# Patient Record
Sex: FEMALE | Race: BLACK OR AFRICAN AMERICAN | NOT HISPANIC OR LATINO | Employment: UNEMPLOYED | URBAN - METROPOLITAN AREA
[De-identification: names, ages, dates, MRNs, and addresses within clinical notes are randomized per-mention and may not be internally consistent; named-entity substitution may affect disease eponyms.]

---

## 2017-01-06 ENCOUNTER — OFFICE VISIT - HEALTHEAST (OUTPATIENT)
Dept: SURGERY | Facility: CLINIC | Age: 27
End: 2017-01-06

## 2017-01-06 DIAGNOSIS — E66.01 MORBID OBESITY WITH BMI OF 45.0-49.9, ADULT (H): ICD-10-CM

## 2017-01-06 DIAGNOSIS — R63.2 HYPERPHAGIA: ICD-10-CM

## 2017-01-06 DIAGNOSIS — E66.01 MORBID OBESITY WITH BMI OF 40.0-44.9, ADULT (H): ICD-10-CM

## 2017-01-06 ASSESSMENT — MIFFLIN-ST. JEOR: SCORE: 1838.53

## 2017-02-14 ENCOUNTER — OFFICE VISIT - HEALTHEAST (OUTPATIENT)
Dept: SURGERY | Facility: CLINIC | Age: 27
End: 2017-02-14

## 2017-02-14 DIAGNOSIS — E66.01 OBESITY, MORBID, BMI 40.0-49.9 (H): ICD-10-CM

## 2017-02-14 DIAGNOSIS — Z71.3 DIETARY COUNSELING: ICD-10-CM

## 2017-02-14 ASSESSMENT — MIFFLIN-ST. JEOR: SCORE: 1836.26

## 2017-02-23 ENCOUNTER — COMMUNICATION - HEALTHEAST (OUTPATIENT)
Dept: GERIATRICS | Facility: CLINIC | Age: 27
End: 2017-02-23

## 2017-04-21 ENCOUNTER — OFFICE VISIT - HEALTHEAST (OUTPATIENT)
Dept: SURGERY | Facility: CLINIC | Age: 27
End: 2017-04-21

## 2017-04-21 DIAGNOSIS — E66.01 MORBID OBESITY (H): ICD-10-CM

## 2017-04-21 DIAGNOSIS — R63.2 BINGE EATING: ICD-10-CM

## 2017-04-21 DIAGNOSIS — R63.8 ABNORMAL CRAVING: ICD-10-CM

## 2017-04-21 DIAGNOSIS — F32.A ANXIETY AND DEPRESSION: ICD-10-CM

## 2017-04-21 DIAGNOSIS — F41.9 ANXIETY AND DEPRESSION: ICD-10-CM

## 2017-04-21 ASSESSMENT — MIFFLIN-ST. JEOR: SCORE: 1879.35

## 2017-04-24 ENCOUNTER — OFFICE VISIT - HEALTHEAST (OUTPATIENT)
Dept: SURGERY | Facility: CLINIC | Age: 27
End: 2017-04-24

## 2017-04-24 DIAGNOSIS — E66.01 MORBID OBESITY (H): ICD-10-CM

## 2017-07-14 ENCOUNTER — OFFICE VISIT - HEALTHEAST (OUTPATIENT)
Dept: SURGERY | Facility: CLINIC | Age: 27
End: 2017-07-14

## 2017-07-14 DIAGNOSIS — Z71.3 NUTRITIONAL COUNSELING: ICD-10-CM

## 2017-07-14 DIAGNOSIS — E66.01 OBESITY, MORBID, BMI 40.0-49.9 (H): ICD-10-CM

## 2017-07-14 ASSESSMENT — MIFFLIN-ST. JEOR: SCORE: 1895.23

## 2017-07-21 ENCOUNTER — OFFICE VISIT - HEALTHEAST (OUTPATIENT)
Dept: FAMILY MEDICINE | Facility: CLINIC | Age: 27
End: 2017-07-21

## 2017-07-21 ENCOUNTER — AMBULATORY - HEALTHEAST (OUTPATIENT)
Dept: FAMILY MEDICINE | Facility: CLINIC | Age: 27
End: 2017-07-21

## 2017-07-21 DIAGNOSIS — R87.619 ABNORMAL PAP SMEAR OF CERVIX: ICD-10-CM

## 2017-07-21 DIAGNOSIS — Z00.00 ANNUAL PHYSICAL EXAM: ICD-10-CM

## 2017-07-21 DIAGNOSIS — E55.9 VITAMIN D DEFICIENCY: ICD-10-CM

## 2017-07-21 DIAGNOSIS — J45.20 INTERMITTENT ASTHMA: ICD-10-CM

## 2017-07-21 DIAGNOSIS — Z11.3 SCREEN FOR STD (SEXUALLY TRANSMITTED DISEASE): ICD-10-CM

## 2017-07-21 DIAGNOSIS — E66.01 MORBID OBESITY (H): ICD-10-CM

## 2017-07-21 DIAGNOSIS — D64.9 ANEMIA: ICD-10-CM

## 2017-07-21 LAB
HBA1C MFR BLD: 5.6 % (ref 3.5–6)
HIV 1+2 AB+HIV1 P24 AG SERPL QL IA: NEGATIVE

## 2017-07-21 ASSESSMENT — MIFFLIN-ST. JEOR: SCORE: 1894.66

## 2017-07-24 LAB — SYPHILIS RPR SCREEN - HISTORICAL: NORMAL

## 2017-07-27 LAB
HPV INTERPRETATION - HISTORICAL: NORMAL
HPV INTERPRETER - HISTORICAL: NORMAL

## 2017-07-30 ENCOUNTER — COMMUNICATION - HEALTHEAST (OUTPATIENT)
Dept: FAMILY MEDICINE | Facility: CLINIC | Age: 27
End: 2017-07-30

## 2017-09-15 ENCOUNTER — COMMUNICATION - HEALTHEAST (OUTPATIENT)
Dept: SURGERY | Facility: CLINIC | Age: 27
End: 2017-09-15

## 2017-09-15 ENCOUNTER — AMBULATORY - HEALTHEAST (OUTPATIENT)
Dept: SURGERY | Facility: CLINIC | Age: 27
End: 2017-09-15

## 2017-09-15 ENCOUNTER — OFFICE VISIT - HEALTHEAST (OUTPATIENT)
Dept: SURGERY | Facility: CLINIC | Age: 27
End: 2017-09-15

## 2017-09-15 DIAGNOSIS — E66.01 OBESITY, MORBID, BMI 40.0-49.9 (H): ICD-10-CM

## 2017-09-15 DIAGNOSIS — E66.01 MORBID OBESITY WITH BMI OF 40.0-44.9, ADULT (H): ICD-10-CM

## 2017-09-15 DIAGNOSIS — Z71.3 NUTRITIONAL COUNSELING: ICD-10-CM

## 2017-09-15 ASSESSMENT — MIFFLIN-ST. JEOR: SCORE: 1902.03

## 2017-09-19 ENCOUNTER — AMBULATORY - HEALTHEAST (OUTPATIENT)
Dept: SURGERY | Facility: CLINIC | Age: 27
End: 2017-09-19

## 2017-09-19 DIAGNOSIS — R63.2 BINGE EATING: ICD-10-CM

## 2017-09-21 ENCOUNTER — AMBULATORY - HEALTHEAST (OUTPATIENT)
Dept: SURGERY | Facility: CLINIC | Age: 27
End: 2017-09-21

## 2017-11-28 ENCOUNTER — OFFICE VISIT - HEALTHEAST (OUTPATIENT)
Dept: SURGERY | Facility: CLINIC | Age: 27
End: 2017-11-28

## 2017-11-28 DIAGNOSIS — R63.2 HYPERPHAGIA: ICD-10-CM

## 2017-11-28 DIAGNOSIS — E66.01 MORBID OBESITY (H): ICD-10-CM

## 2017-11-28 ASSESSMENT — MIFFLIN-ST. JEOR: SCORE: 1854.4

## 2017-12-22 ENCOUNTER — OFFICE VISIT - HEALTHEAST (OUTPATIENT)
Dept: SURGERY | Facility: CLINIC | Age: 27
End: 2017-12-22

## 2017-12-22 DIAGNOSIS — Z71.3 NUTRITIONAL COUNSELING: ICD-10-CM

## 2017-12-22 DIAGNOSIS — E66.01 OBESITY, MORBID, BMI 40.0-49.9 (H): ICD-10-CM

## 2017-12-22 ASSESSMENT — MIFFLIN-ST. JEOR: SCORE: 1827.19

## 2018-05-15 ENCOUNTER — OFFICE VISIT - HEALTHEAST (OUTPATIENT)
Dept: SURGERY | Facility: CLINIC | Age: 28
End: 2018-05-15

## 2018-05-15 DIAGNOSIS — E66.01 MORBID OBESITY (H): ICD-10-CM

## 2018-05-15 DIAGNOSIS — R63.8 ABNORMAL CRAVING: ICD-10-CM

## 2018-05-15 DIAGNOSIS — R53.83 FATIGUE: ICD-10-CM

## 2018-05-15 ASSESSMENT — MIFFLIN-ST. JEOR: SCORE: 1831.72

## 2018-06-01 ENCOUNTER — COMMUNICATION - HEALTHEAST (OUTPATIENT)
Dept: SURGERY | Facility: CLINIC | Age: 28
End: 2018-06-01

## 2018-06-14 ENCOUNTER — COMMUNICATION - HEALTHEAST (OUTPATIENT)
Dept: SURGERY | Facility: CLINIC | Age: 28
End: 2018-06-14

## 2018-06-14 DIAGNOSIS — R63.2 HYPERPHAGIA: ICD-10-CM

## 2018-06-14 DIAGNOSIS — E66.01 MORBID OBESITY (H): ICD-10-CM

## 2018-07-20 ENCOUNTER — COMMUNICATION - HEALTHEAST (OUTPATIENT)
Dept: SURGERY | Facility: CLINIC | Age: 28
End: 2018-07-20

## 2018-07-20 ENCOUNTER — OFFICE VISIT - HEALTHEAST (OUTPATIENT)
Dept: SURGERY | Facility: CLINIC | Age: 28
End: 2018-07-20

## 2018-07-20 DIAGNOSIS — E55.9 VITAMIN D DEFICIENCY: ICD-10-CM

## 2018-07-20 DIAGNOSIS — E66.01 OBESITY, MORBID, BMI 40.0-49.9 (H): ICD-10-CM

## 2018-07-20 DIAGNOSIS — Z71.3 NUTRITIONAL COUNSELING: ICD-10-CM

## 2018-07-20 ASSESSMENT — MIFFLIN-ST. JEOR: SCORE: 1804.51

## 2018-07-24 ENCOUNTER — AMBULATORY - HEALTHEAST (OUTPATIENT)
Dept: SURGERY | Facility: CLINIC | Age: 28
End: 2018-07-24

## 2018-09-21 ENCOUNTER — OFFICE VISIT - HEALTHEAST (OUTPATIENT)
Dept: SURGERY | Facility: CLINIC | Age: 28
End: 2018-09-21

## 2018-09-21 DIAGNOSIS — E66.01 OBESITY, MORBID, BMI 40.0-49.9 (H): ICD-10-CM

## 2018-09-21 DIAGNOSIS — E55.9 VITAMIN D DEFICIENCY: ICD-10-CM

## 2018-09-21 DIAGNOSIS — Z71.3 NUTRITIONAL COUNSELING: ICD-10-CM

## 2018-09-21 ASSESSMENT — MIFFLIN-ST. JEOR: SCORE: 1833.99

## 2018-10-16 ENCOUNTER — COMMUNICATION - HEALTHEAST (OUTPATIENT)
Dept: SURGERY | Facility: CLINIC | Age: 28
End: 2018-10-16

## 2018-10-20 ENCOUNTER — COMMUNICATION - HEALTHEAST (OUTPATIENT)
Dept: SURGERY | Facility: CLINIC | Age: 28
End: 2018-10-20

## 2019-01-08 ENCOUNTER — OFFICE VISIT - HEALTHEAST (OUTPATIENT)
Dept: FAMILY MEDICINE | Facility: CLINIC | Age: 29
End: 2019-01-08

## 2019-01-08 DIAGNOSIS — E66.01 MORBID OBESITY (H): ICD-10-CM

## 2019-01-08 DIAGNOSIS — J45.20 MILD INTERMITTENT ASTHMA WITHOUT COMPLICATION: ICD-10-CM

## 2019-01-08 DIAGNOSIS — E55.9 VITAMIN D DEFICIENCY: ICD-10-CM

## 2019-01-08 DIAGNOSIS — Z79.899 HIGH RISK MEDICATION USE: ICD-10-CM

## 2019-01-08 DIAGNOSIS — Z00.00 ANNUAL PHYSICAL EXAM: ICD-10-CM

## 2019-01-08 DIAGNOSIS — Z11.3 SCREEN FOR STD (SEXUALLY TRANSMITTED DISEASE): ICD-10-CM

## 2019-01-08 LAB
CLUE CELLS: NORMAL
HBA1C MFR BLD: 5.2 % (ref 3.5–6)
HIV 1+2 AB+HIV1 P24 AG SERPL QL IA: NEGATIVE
TRICHOMONAS, WET PREP: NORMAL
YEAST, WET PREP: NORMAL

## 2019-01-08 ASSESSMENT — MIFFLIN-ST. JEOR: SCORE: 1771.62

## 2019-01-09 LAB
25(OH)D3 SERPL-MCNC: 44.9 NG/ML (ref 30–80)
25(OH)D3 SERPL-MCNC: 44.9 NG/ML (ref 30–80)
C TRACH DNA SPEC QL PROBE+SIG AMP: NEGATIVE
N GONORRHOEA DNA SPEC QL NAA+PROBE: NEGATIVE
T PALLIDUM AB SER QL: NEGATIVE

## 2019-01-13 ENCOUNTER — COMMUNICATION - HEALTHEAST (OUTPATIENT)
Dept: SURGERY | Facility: CLINIC | Age: 29
End: 2019-01-13

## 2019-01-14 ENCOUNTER — COMMUNICATION - HEALTHEAST (OUTPATIENT)
Dept: SURGERY | Facility: CLINIC | Age: 29
End: 2019-01-14

## 2019-01-14 DIAGNOSIS — R63.2 HYPERPHAGIA: ICD-10-CM

## 2019-01-14 DIAGNOSIS — E66.01 MORBID OBESITY (H): ICD-10-CM

## 2019-01-15 ENCOUNTER — AMBULATORY - HEALTHEAST (OUTPATIENT)
Dept: SURGERY | Facility: CLINIC | Age: 29
End: 2019-01-15

## 2019-01-15 DIAGNOSIS — E66.01 MORBID OBESITY (H): ICD-10-CM

## 2019-01-15 DIAGNOSIS — R63.2 HYPERPHAGIA: ICD-10-CM

## 2019-01-17 ENCOUNTER — AMBULATORY - HEALTHEAST (OUTPATIENT)
Dept: SURGERY | Facility: CLINIC | Age: 29
End: 2019-01-17

## 2019-01-17 DIAGNOSIS — E66.01 MORBID OBESITY (H): ICD-10-CM

## 2019-01-17 DIAGNOSIS — R63.2 HYPERPHAGIA: ICD-10-CM

## 2019-01-18 ENCOUNTER — COMMUNICATION - HEALTHEAST (OUTPATIENT)
Dept: SCHEDULING | Facility: CLINIC | Age: 29
End: 2019-01-18

## 2019-01-18 ENCOUNTER — RECORDS - HEALTHEAST (OUTPATIENT)
Dept: ADMINISTRATIVE | Facility: OTHER | Age: 29
End: 2019-01-18

## 2019-01-21 ENCOUNTER — OFFICE VISIT - HEALTHEAST (OUTPATIENT)
Dept: FAMILY MEDICINE | Facility: CLINIC | Age: 29
End: 2019-01-21

## 2019-01-21 DIAGNOSIS — Z32.01 PREGNANCY TEST POSITIVE: ICD-10-CM

## 2019-01-21 DIAGNOSIS — Z79.899 HIGH RISK MEDICATION USE: ICD-10-CM

## 2019-01-21 DIAGNOSIS — N92.6 ABNORMAL MENSES: ICD-10-CM

## 2019-01-21 DIAGNOSIS — O09.891 HISTORY OF PRETERM DELIVERY, CURRENTLY PREGNANT IN FIRST TRIMESTER: ICD-10-CM

## 2019-01-21 LAB
ALBUMIN UR-MCNC: NEGATIVE MG/DL
APPEARANCE UR: CLEAR
BILIRUB UR QL STRIP: NEGATIVE
COLOR UR AUTO: YELLOW
GLUCOSE UR STRIP-MCNC: NEGATIVE MG/DL
HCG UR QL: POSITIVE
HGB UR QL STRIP: NEGATIVE
KETONES UR STRIP-MCNC: NEGATIVE MG/DL
LEUKOCYTE ESTERASE UR QL STRIP: NEGATIVE
NITRATE UR QL: NEGATIVE
PH UR STRIP: 5.5 [PH] (ref 5–8)
SP GR UR STRIP: >=1.03 (ref 1–1.03)
UROBILINOGEN UR STRIP-ACNC: NORMAL

## 2019-04-16 ENCOUNTER — AMBULATORY - HEALTHEAST (OUTPATIENT)
Dept: CARDIOLOGY | Facility: HOSPITAL | Age: 29
End: 2019-04-16

## 2019-04-16 ENCOUNTER — HOSPITAL ENCOUNTER (OUTPATIENT)
Dept: CARDIOLOGY | Facility: HOSPITAL | Age: 29
Discharge: HOME | End: 2019-04-16
Attending: OBSTETRICS & GYNECOLOGY

## 2019-04-16 DIAGNOSIS — O09.90 HIGH-RISK PREGNANCY, UNSPECIFIED TRIMESTER: ICD-10-CM

## 2019-04-16 DIAGNOSIS — R42 VERTIGO: ICD-10-CM

## 2019-04-16 LAB
ATRIAL RATE - MUSE: 91 BPM
DIASTOLIC BLOOD PRESSURE - MUSE: NORMAL MMHG
INTERPRETATION ECG - MUSE: NORMAL
P AXIS - MUSE: 48 DEGREES
PR INTERVAL - MUSE: 156 MS
QRS DURATION - MUSE: 68 MS
QT - MUSE: 332 MS
QTC - MUSE: 408 MS
R AXIS - MUSE: 66 DEGREES
SYSTOLIC BLOOD PRESSURE - MUSE: NORMAL MMHG
T AXIS - MUSE: 20 DEGREES
VENTRICULAR RATE- MUSE: 91 BPM

## 2019-04-18 ENCOUNTER — OFFICE VISIT - HEALTHEAST (OUTPATIENT)
Dept: FAMILY MEDICINE | Facility: CLINIC | Age: 29
End: 2019-04-18

## 2019-04-18 DIAGNOSIS — R55 SYNCOPE, UNSPECIFIED SYNCOPE TYPE: ICD-10-CM

## 2019-04-18 DIAGNOSIS — Z32.01 PREGNANCY TEST POSITIVE: ICD-10-CM

## 2019-04-18 DIAGNOSIS — E66.01 MORBID OBESITY (H): ICD-10-CM

## 2019-04-25 ENCOUNTER — OFFICE VISIT - HEALTHEAST (OUTPATIENT)
Dept: CARDIOLOGY | Facility: CLINIC | Age: 29
End: 2019-04-25

## 2019-04-25 DIAGNOSIS — R55 SYNCOPE, UNSPECIFIED SYNCOPE TYPE: ICD-10-CM

## 2019-04-25 DIAGNOSIS — E66.01 MORBID OBESITY (H): ICD-10-CM

## 2019-04-25 LAB
ATRIAL RATE - MUSE: 99 BPM
DIASTOLIC BLOOD PRESSURE - MUSE: NORMAL MMHG
INTERPRETATION ECG - MUSE: NORMAL
P AXIS - MUSE: NORMAL DEGREES
PR INTERVAL - MUSE: 162 MS
QRS DURATION - MUSE: 74 MS
QT - MUSE: 338 MS
QTC - MUSE: 433 MS
R AXIS - MUSE: 133 DEGREES
SYSTOLIC BLOOD PRESSURE - MUSE: NORMAL MMHG
T AXIS - MUSE: 163 DEGREES
VENTRICULAR RATE- MUSE: 99 BPM

## 2019-04-25 ASSESSMENT — MIFFLIN-ST. JEOR: SCORE: 1785.23

## 2019-05-01 ENCOUNTER — RECORDS - HEALTHEAST (OUTPATIENT)
Dept: ADMINISTRATIVE | Facility: OTHER | Age: 29
End: 2019-05-01

## 2019-05-02 ENCOUNTER — HOSPITAL ENCOUNTER (OUTPATIENT)
Dept: CARDIOLOGY | Facility: HOSPITAL | Age: 29
Discharge: HOME | End: 2019-05-02
Attending: INTERNAL MEDICINE

## 2019-05-02 ENCOUNTER — RECORDS - HEALTHEAST (OUTPATIENT)
Dept: ADMINISTRATIVE | Facility: OTHER | Age: 29
End: 2019-05-02

## 2019-05-02 DIAGNOSIS — R55 SYNCOPE, UNSPECIFIED SYNCOPE TYPE: ICD-10-CM

## 2019-05-14 ENCOUNTER — RECORDS - HEALTHEAST (OUTPATIENT)
Dept: ADMINISTRATIVE | Facility: OTHER | Age: 29
End: 2019-05-14

## 2019-05-20 ENCOUNTER — TELEPHONE (OUTPATIENT)
Dept: MATERNAL FETAL MEDICINE | Facility: CLINIC | Age: 29
End: 2019-05-20

## 2019-05-20 ENCOUNTER — MEDICAL CORRESPONDENCE (OUTPATIENT)
Dept: HEALTH INFORMATION MANAGEMENT | Facility: CLINIC | Age: 29
End: 2019-05-20

## 2019-05-20 NOTE — TELEPHONE ENCOUNTER
"[facility] received a referral from provider, Crystal Dozier, at [facility] regarding \"Twins\".  There was no marking on what the provider wanted patient to be seen for. Writer called and spoke to Kathi at [facility], and she said to disregard referral due to fetal loss.      Referring clinic notified. Removing order.    Stefano Haynes,    , [facility]   "

## 2019-10-01 ENCOUNTER — OFFICE VISIT - HEALTHEAST (OUTPATIENT)
Dept: SURGERY | Facility: CLINIC | Age: 29
End: 2019-10-01

## 2019-10-01 DIAGNOSIS — F50.89 PICA: ICD-10-CM

## 2019-10-01 DIAGNOSIS — E66.01 MORBID OBESITY WITH BMI OF 40.0-44.9, ADULT (H): ICD-10-CM

## 2019-10-01 DIAGNOSIS — D50.9 IRON DEFICIENCY ANEMIA, UNSPECIFIED IRON DEFICIENCY ANEMIA TYPE: ICD-10-CM

## 2019-10-01 ASSESSMENT — MIFFLIN-ST. JEOR: SCORE: 1807.91

## 2019-10-08 ENCOUNTER — COMMUNICATION - HEALTHEAST (OUTPATIENT)
Dept: SURGERY | Facility: CLINIC | Age: 29
End: 2019-10-08

## 2019-10-21 ENCOUNTER — OFFICE VISIT - HEALTHEAST (OUTPATIENT)
Dept: SURGERY | Facility: CLINIC | Age: 29
End: 2019-10-21

## 2019-10-21 DIAGNOSIS — E66.01 OBESITY, MORBID, BMI 40.0-49.9 (H): ICD-10-CM

## 2019-10-21 DIAGNOSIS — F32.A DEPRESSION, UNSPECIFIED DEPRESSION TYPE: ICD-10-CM

## 2019-10-21 DIAGNOSIS — Z71.3 NUTRITIONAL COUNSELING: ICD-10-CM

## 2019-10-21 ASSESSMENT — MIFFLIN-ST. JEOR: SCORE: 1812.44

## 2019-12-17 ENCOUNTER — OFFICE VISIT - HEALTHEAST (OUTPATIENT)
Dept: SURGERY | Facility: CLINIC | Age: 29
End: 2019-12-17

## 2019-12-17 DIAGNOSIS — R63.8 ABNORMAL CRAVING: ICD-10-CM

## 2019-12-17 DIAGNOSIS — E66.01 MORBID OBESITY WITH BMI OF 40.0-44.9, ADULT (H): ICD-10-CM

## 2019-12-17 ASSESSMENT — MIFFLIN-ST. JEOR: SCORE: 1844.2

## 2019-12-19 ENCOUNTER — OFFICE VISIT - HEALTHEAST (OUTPATIENT)
Dept: FAMILY MEDICINE | Facility: CLINIC | Age: 29
End: 2019-12-19

## 2019-12-19 DIAGNOSIS — E66.01 MORBID OBESITY (H): ICD-10-CM

## 2019-12-19 DIAGNOSIS — N88.3 CI (CERVICAL INCOMPETENCE): ICD-10-CM

## 2019-12-19 DIAGNOSIS — O34.32 CERVICAL INSUFFICIENCY DURING PREGNANCY IN SECOND TRIMESTER, ANTEPARTUM: ICD-10-CM

## 2019-12-19 DIAGNOSIS — O24.410 DIET CONTROLLED GESTATIONAL DIABETES MELLITUS (GDM) IN SECOND TRIMESTER: ICD-10-CM

## 2019-12-19 DIAGNOSIS — J45.20 MILD INTERMITTENT ASTHMA WITHOUT COMPLICATION: ICD-10-CM

## 2020-03-18 ENCOUNTER — COMMUNICATION - HEALTHEAST (OUTPATIENT)
Dept: SURGERY | Facility: CLINIC | Age: 30
End: 2020-03-18

## 2020-04-23 ENCOUNTER — COMMUNICATION - HEALTHEAST (OUTPATIENT)
Dept: SURGERY | Facility: CLINIC | Age: 30
End: 2020-04-23

## 2020-04-23 ENCOUNTER — OFFICE VISIT - HEALTHEAST (OUTPATIENT)
Dept: SURGERY | Facility: CLINIC | Age: 30
End: 2020-04-23

## 2020-04-23 DIAGNOSIS — E66.01 MORBID OBESITY (H): ICD-10-CM

## 2020-04-23 DIAGNOSIS — J45.20 MILD INTERMITTENT ASTHMA WITHOUT COMPLICATION: ICD-10-CM

## 2020-04-23 DIAGNOSIS — R53.83 FATIGUE, UNSPECIFIED TYPE: ICD-10-CM

## 2020-04-23 DIAGNOSIS — M54.50 LOW BACK PAIN, UNSPECIFIED BACK PAIN LATERALITY, UNSPECIFIED CHRONICITY, UNSPECIFIED WHETHER SCIATICA PRESENT: ICD-10-CM

## 2020-04-23 ASSESSMENT — MIFFLIN-ST. JEOR: SCORE: 1871.41

## 2020-04-29 ENCOUNTER — COMMUNICATION - HEALTHEAST (OUTPATIENT)
Dept: SURGERY | Facility: CLINIC | Age: 30
End: 2020-04-29

## 2020-04-30 ENCOUNTER — COMMUNICATION - HEALTHEAST (OUTPATIENT)
Dept: SURGERY | Facility: CLINIC | Age: 30
End: 2020-04-30

## 2020-04-30 ENCOUNTER — AMBULATORY - HEALTHEAST (OUTPATIENT)
Dept: LAB | Facility: CLINIC | Age: 30
End: 2020-04-30

## 2020-04-30 DIAGNOSIS — M54.50 LOW BACK PAIN, UNSPECIFIED BACK PAIN LATERALITY, UNSPECIFIED CHRONICITY, UNSPECIFIED WHETHER SCIATICA PRESENT: ICD-10-CM

## 2020-04-30 DIAGNOSIS — E66.01 MORBID OBESITY (H): ICD-10-CM

## 2020-04-30 DIAGNOSIS — R53.83 FATIGUE, UNSPECIFIED TYPE: ICD-10-CM

## 2020-04-30 DIAGNOSIS — J45.20 MILD INTERMITTENT ASTHMA WITHOUT COMPLICATION: ICD-10-CM

## 2020-04-30 LAB
ALBUMIN SERPL-MCNC: 3.4 G/DL (ref 3.5–5)
ALP SERPL-CCNC: 61 U/L (ref 45–120)
ALT SERPL W P-5'-P-CCNC: 14 U/L (ref 0–45)
ANION GAP SERPL CALCULATED.3IONS-SCNC: 9 MMOL/L (ref 5–18)
AST SERPL W P-5'-P-CCNC: 16 U/L (ref 0–40)
BILIRUB SERPL-MCNC: 0.3 MG/DL (ref 0–1)
BUN SERPL-MCNC: 8 MG/DL (ref 8–22)
CALCIUM SERPL-MCNC: 9.1 MG/DL (ref 8.5–10.5)
CHLORIDE BLD-SCNC: 105 MMOL/L (ref 98–107)
CHOLEST SERPL-MCNC: 146 MG/DL
CO2 SERPL-SCNC: 25 MMOL/L (ref 22–31)
CREAT SERPL-MCNC: 0.73 MG/DL (ref 0.6–1.1)
ERYTHROCYTE [DISTWIDTH] IN BLOOD BY AUTOMATED COUNT: 14.2 % (ref 11–14.5)
FASTING STATUS PATIENT QL REPORTED: YES
FERRITIN SERPL-MCNC: 10 NG/ML (ref 10–130)
FOLATE SERPL-MCNC: 18.6 NG/ML
GFR SERPL CREATININE-BSD FRML MDRD: >60 ML/MIN/1.73M2
GLUCOSE BLD-MCNC: 79 MG/DL (ref 70–125)
HBA1C MFR BLD: 5.1 % (ref 3.5–6)
HCT VFR BLD AUTO: 37.7 % (ref 35–47)
HDLC SERPL-MCNC: 61 MG/DL
HGB BLD-MCNC: 12.2 G/DL (ref 12–16)
LDLC SERPL CALC-MCNC: 74 MG/DL
MCH RBC QN AUTO: 23.6 PG (ref 27–34)
MCHC RBC AUTO-ENTMCNC: 32.4 G/DL (ref 32–36)
MCV RBC AUTO: 73 FL (ref 80–100)
PLATELET # BLD AUTO: 272 THOU/UL (ref 140–440)
PMV BLD AUTO: 9.1 FL (ref 7–10)
POTASSIUM BLD-SCNC: 4.4 MMOL/L (ref 3.5–5)
PROT SERPL-MCNC: 6.9 G/DL (ref 6–8)
PTH-INTACT SERPL-MCNC: 44 PG/ML (ref 10–86)
RBC # BLD AUTO: 5.18 MILL/UL (ref 3.8–5.4)
SODIUM SERPL-SCNC: 139 MMOL/L (ref 136–145)
TRIGL SERPL-MCNC: 55 MG/DL
TSH SERPL DL<=0.005 MIU/L-ACNC: 0.91 UIU/ML (ref 0.3–5)
VIT B12 SERPL-MCNC: 921 PG/ML (ref 213–816)
WBC: 7.8 THOU/UL (ref 4–11)

## 2020-05-01 LAB — 25(OH)D3 SERPL-MCNC: 31.2 NG/ML (ref 30–80)

## 2020-05-02 LAB — ZINC SERPL-MCNC: 67.5 UG/DL (ref 60–120)

## 2020-05-03 LAB
ANNOTATION COMMENT IMP: NORMAL
VIT A SERPL-MCNC: 0.45 MG/L (ref 0.3–1.2)
VIT B1 PYROPHOSHATE BLD-SCNC: 91 NMOL/L (ref 70–180)
VITAMIN A (RETINYL PALMITATE): 0.02 MG/L (ref 0–0.1)

## 2020-05-04 ENCOUNTER — COMMUNICATION - HEALTHEAST (OUTPATIENT)
Dept: SURGERY | Facility: CLINIC | Age: 30
End: 2020-05-04

## 2020-05-15 ENCOUNTER — COMMUNICATION - HEALTHEAST (OUTPATIENT)
Dept: SURGERY | Facility: CLINIC | Age: 30
End: 2020-05-15

## 2020-05-15 ENCOUNTER — OFFICE VISIT - HEALTHEAST (OUTPATIENT)
Dept: SURGERY | Facility: CLINIC | Age: 30
End: 2020-05-15

## 2020-05-15 DIAGNOSIS — E66.01 OBESITY, CLASS III, BMI 40-49.9 (MORBID OBESITY) (H): ICD-10-CM

## 2020-05-15 DIAGNOSIS — Z71.3 NUTRITIONAL COUNSELING: ICD-10-CM

## 2020-05-15 DIAGNOSIS — M54.50 LOW BACK PAIN, UNSPECIFIED BACK PAIN LATERALITY, UNSPECIFIED CHRONICITY, UNSPECIFIED WHETHER SCIATICA PRESENT: ICD-10-CM

## 2020-05-15 DIAGNOSIS — J45.20 MILD INTERMITTENT ASTHMA WITHOUT COMPLICATION: ICD-10-CM

## 2020-05-15 DIAGNOSIS — E66.01 MORBID OBESITY (H): ICD-10-CM

## 2020-05-15 DIAGNOSIS — R53.83 FATIGUE, UNSPECIFIED TYPE: ICD-10-CM

## 2020-05-15 ASSESSMENT — MIFFLIN-ST. JEOR: SCORE: 1903.16

## 2020-05-19 ENCOUNTER — AMBULATORY - HEALTHEAST (OUTPATIENT)
Dept: SURGERY | Facility: CLINIC | Age: 30
End: 2020-05-19

## 2020-05-19 DIAGNOSIS — E66.01 MORBID OBESITY (H): ICD-10-CM

## 2020-05-20 ENCOUNTER — COMMUNICATION - HEALTHEAST (OUTPATIENT)
Dept: SURGERY | Facility: CLINIC | Age: 30
End: 2020-05-20

## 2020-05-21 ENCOUNTER — OFFICE VISIT - HEALTHEAST (OUTPATIENT)
Dept: SURGERY | Facility: CLINIC | Age: 30
End: 2020-05-21

## 2020-05-21 ENCOUNTER — COMMUNICATION - HEALTHEAST (OUTPATIENT)
Dept: SURGERY | Facility: CLINIC | Age: 30
End: 2020-05-21

## 2020-05-21 DIAGNOSIS — E66.01 MORBID OBESITY (H): ICD-10-CM

## 2020-06-04 ENCOUNTER — OFFICE VISIT - HEALTHEAST (OUTPATIENT)
Dept: SURGERY | Facility: CLINIC | Age: 30
End: 2020-06-04

## 2020-06-04 DIAGNOSIS — E66.01 MORBID OBESITY (H): ICD-10-CM

## 2020-06-19 ENCOUNTER — OFFICE VISIT - HEALTHEAST (OUTPATIENT)
Dept: SURGERY | Facility: CLINIC | Age: 30
End: 2020-06-19

## 2020-06-19 DIAGNOSIS — Z71.3 NUTRITIONAL COUNSELING: ICD-10-CM

## 2020-06-19 DIAGNOSIS — E66.01 OBESITY, CLASS III, BMI 40-49.9 (MORBID OBESITY) (H): ICD-10-CM

## 2020-06-19 ASSESSMENT — MIFFLIN-ST. JEOR: SCORE: 1880.48

## 2020-06-22 ENCOUNTER — OFFICE VISIT - HEALTHEAST (OUTPATIENT)
Dept: SURGERY | Facility: CLINIC | Age: 30
End: 2020-06-22

## 2020-06-22 DIAGNOSIS — E66.01 MORBID OBESITY (H): ICD-10-CM

## 2020-06-25 ENCOUNTER — AMBULATORY - HEALTHEAST (OUTPATIENT)
Dept: SURGERY | Facility: CLINIC | Age: 30
End: 2020-06-25

## 2020-07-01 ENCOUNTER — COMMUNICATION - HEALTHEAST (OUTPATIENT)
Dept: SURGERY | Facility: CLINIC | Age: 30
End: 2020-07-01

## 2020-07-01 ENCOUNTER — AMBULATORY - HEALTHEAST (OUTPATIENT)
Dept: SURGERY | Facility: CLINIC | Age: 30
End: 2020-07-01

## 2020-07-01 DIAGNOSIS — E66.01 MORBID OBESITY (H): ICD-10-CM

## 2020-07-16 ENCOUNTER — OFFICE VISIT - HEALTHEAST (OUTPATIENT)
Dept: FAMILY MEDICINE | Facility: CLINIC | Age: 30
End: 2020-07-16

## 2020-07-16 DIAGNOSIS — J45.20 MILD INTERMITTENT ASTHMA WITHOUT COMPLICATION: ICD-10-CM

## 2020-07-16 DIAGNOSIS — F33.0 MILD EPISODE OF RECURRENT MAJOR DEPRESSIVE DISORDER (H): ICD-10-CM

## 2020-07-16 DIAGNOSIS — Z00.00 ANNUAL PHYSICAL EXAM: ICD-10-CM

## 2020-07-16 DIAGNOSIS — Z11.3 SCREEN FOR STD (SEXUALLY TRANSMITTED DISEASE): ICD-10-CM

## 2020-07-16 DIAGNOSIS — A74.9 CHLAMYDIA INFECTION: ICD-10-CM

## 2020-07-16 DIAGNOSIS — E66.01 MORBID OBESITY (H): ICD-10-CM

## 2020-07-16 LAB
CLUE CELLS: NORMAL
TRICHOMONAS, WET PREP: NORMAL
YEAST, WET PREP: NORMAL

## 2020-07-16 ASSESSMENT — MIFFLIN-ST. JEOR: SCORE: 1886.72

## 2020-07-16 ASSESSMENT — PATIENT HEALTH QUESTIONNAIRE - PHQ9: SUM OF ALL RESPONSES TO PHQ QUESTIONS 1-9: 9

## 2020-07-17 ENCOUNTER — COMMUNICATION - HEALTHEAST (OUTPATIENT)
Dept: FAMILY MEDICINE | Facility: CLINIC | Age: 30
End: 2020-07-17

## 2020-07-17 LAB
C TRACH DNA SPEC QL PROBE+SIG AMP: POSITIVE
HPV SOURCE: NORMAL
HUMAN PAPILLOMA VIRUS 16 DNA: NEGATIVE
HUMAN PAPILLOMA VIRUS 18 DNA: NEGATIVE
HUMAN PAPILLOMA VIRUS FINAL DIAGNOSIS: NORMAL
HUMAN PAPILLOMA VIRUS OTHER HR: NEGATIVE
N GONORRHOEA DNA SPEC QL NAA+PROBE: NEGATIVE
SPECIMEN DESCRIPTION: NORMAL

## 2020-07-23 ENCOUNTER — OFFICE VISIT - HEALTHEAST (OUTPATIENT)
Dept: SURGERY | Facility: CLINIC | Age: 30
End: 2020-07-23

## 2020-07-23 DIAGNOSIS — E66.01 OBESITY, CLASS III, BMI 40-49.9 (MORBID OBESITY) (H): ICD-10-CM

## 2020-07-23 DIAGNOSIS — Z71.3 NUTRITIONAL COUNSELING: ICD-10-CM

## 2020-07-23 LAB
BKR LAB AP ABNORMAL BLEEDING: NO
BKR LAB AP BIRTH CONTROL/HORMONES: NORMAL
BKR LAB AP CERVICAL APPEARANCE: NORMAL
BKR LAB AP GYN ADEQUACY: NORMAL
BKR LAB AP GYN INTERPRETATION: NORMAL
BKR LAB AP HPV REFLEX: NORMAL
BKR LAB AP LMP: NORMAL
BKR LAB AP PATIENT STATUS: NORMAL
BKR LAB AP PREVIOUS ABNORMAL: NORMAL
BKR LAB AP PREVIOUS NORMAL: 2017
HIGH RISK?: NO
PATH REPORT.COMMENTS IMP SPEC: NORMAL
RESULT FLAG (HE HISTORICAL CONVERSION): NORMAL

## 2020-07-23 ASSESSMENT — MIFFLIN-ST. JEOR: SCORE: 1855.08

## 2020-07-25 ENCOUNTER — COMMUNICATION - HEALTHEAST (OUTPATIENT)
Dept: FAMILY MEDICINE | Facility: CLINIC | Age: 30
End: 2020-07-25

## 2020-08-21 ENCOUNTER — OFFICE VISIT - HEALTHEAST (OUTPATIENT)
Dept: SURGERY | Facility: CLINIC | Age: 30
End: 2020-08-21

## 2020-08-21 DIAGNOSIS — E66.01 OBESITY, CLASS III, BMI 40-49.9 (MORBID OBESITY) (H): ICD-10-CM

## 2020-08-21 DIAGNOSIS — Z71.3 NUTRITIONAL COUNSELING: ICD-10-CM

## 2020-08-21 ASSESSMENT — MIFFLIN-ST. JEOR: SCORE: 1822.08

## 2020-09-03 ENCOUNTER — AMBULATORY - HEALTHEAST (OUTPATIENT)
Dept: FAMILY MEDICINE | Facility: CLINIC | Age: 30
End: 2020-09-03

## 2020-09-03 ENCOUNTER — AMBULATORY - HEALTHEAST (OUTPATIENT)
Dept: LAB | Facility: CLINIC | Age: 30
End: 2020-09-03

## 2020-09-03 DIAGNOSIS — A74.9 CHLAMYDIA INFECTION: ICD-10-CM

## 2020-09-04 LAB
C TRACH DNA SPEC QL PROBE+SIG AMP: NEGATIVE
N GONORRHOEA DNA SPEC QL NAA+PROBE: NEGATIVE

## 2020-09-11 ENCOUNTER — OFFICE VISIT - HEALTHEAST (OUTPATIENT)
Dept: SURGERY | Facility: CLINIC | Age: 30
End: 2020-09-11

## 2020-09-11 DIAGNOSIS — E66.01 OBESITY, CLASS III, BMI 40-49.9 (MORBID OBESITY) (H): ICD-10-CM

## 2020-09-11 DIAGNOSIS — Z71.3 NUTRITIONAL COUNSELING: ICD-10-CM

## 2020-09-11 ASSESSMENT — MIFFLIN-ST. JEOR: SCORE: 1781.26

## 2020-09-21 ENCOUNTER — AMBULATORY - HEALTHEAST (OUTPATIENT)
Dept: SURGERY | Facility: CLINIC | Age: 30
End: 2020-09-21

## 2020-09-21 ENCOUNTER — OFFICE VISIT - HEALTHEAST (OUTPATIENT)
Dept: SURGERY | Facility: CLINIC | Age: 30
End: 2020-09-21

## 2020-09-21 DIAGNOSIS — E66.01 MORBID OBESITY (H): ICD-10-CM

## 2020-09-21 ASSESSMENT — MIFFLIN-ST. JEOR: SCORE: 1799.4

## 2020-09-25 ENCOUNTER — AMBULATORY - HEALTHEAST (OUTPATIENT)
Dept: SURGERY | Facility: CLINIC | Age: 30
End: 2020-09-25

## 2020-09-25 ENCOUNTER — COMMUNICATION - HEALTHEAST (OUTPATIENT)
Dept: SURGERY | Facility: CLINIC | Age: 30
End: 2020-09-25

## 2020-09-25 DIAGNOSIS — Z11.59 ENCOUNTER FOR SCREENING FOR OTHER VIRAL DISEASES: ICD-10-CM

## 2020-10-02 ENCOUNTER — COMMUNICATION - HEALTHEAST (OUTPATIENT)
Dept: SURGERY | Facility: CLINIC | Age: 30
End: 2020-10-02

## 2020-10-07 ENCOUNTER — COMMUNICATION - HEALTHEAST (OUTPATIENT)
Dept: SURGERY | Facility: CLINIC | Age: 30
End: 2020-10-07

## 2020-10-07 DIAGNOSIS — F50.89 PICA: ICD-10-CM

## 2020-10-07 DIAGNOSIS — D50.9 IRON DEFICIENCY ANEMIA, UNSPECIFIED IRON DEFICIENCY ANEMIA TYPE: ICD-10-CM

## 2020-10-07 DIAGNOSIS — R53.83 FATIGUE, UNSPECIFIED TYPE: ICD-10-CM

## 2020-10-13 ENCOUNTER — COMMUNICATION - HEALTHEAST (OUTPATIENT)
Dept: SURGERY | Facility: CLINIC | Age: 30
End: 2020-10-13

## 2020-10-14 ENCOUNTER — AMBULATORY - HEALTHEAST (OUTPATIENT)
Dept: SURGERY | Facility: CLINIC | Age: 30
End: 2020-10-14

## 2020-10-14 DIAGNOSIS — Z98.84 S/P BARIATRIC SURGERY: ICD-10-CM

## 2020-10-14 DIAGNOSIS — K21.9 ACID REFLUX: ICD-10-CM

## 2020-10-14 DIAGNOSIS — Z71.89 ENCOUNTER FOR PRE-BARIATRIC SURGERY COUNSELING AND EDUCATION: ICD-10-CM

## 2020-10-14 DIAGNOSIS — Z11.59 ENCOUNTER FOR SCREENING FOR OTHER VIRAL DISEASES: ICD-10-CM

## 2020-10-14 DIAGNOSIS — E66.01 MORBID OBESITY (H): ICD-10-CM

## 2020-10-14 DIAGNOSIS — Z01.818 PRE-OP TESTING: ICD-10-CM

## 2020-10-14 DIAGNOSIS — R63.4 RAPID WEIGHT LOSS: ICD-10-CM

## 2020-10-15 ENCOUNTER — OFFICE VISIT - HEALTHEAST (OUTPATIENT)
Dept: FAMILY MEDICINE | Facility: CLINIC | Age: 30
End: 2020-10-15

## 2020-10-15 DIAGNOSIS — E66.01 MORBID OBESITY (H): ICD-10-CM

## 2020-10-15 DIAGNOSIS — Z01.818 PRE-OP TESTING: ICD-10-CM

## 2020-10-15 DIAGNOSIS — Z01.818 PRE-OP EXAM: ICD-10-CM

## 2020-10-15 DIAGNOSIS — J45.20 MILD INTERMITTENT ASTHMA WITHOUT COMPLICATION: ICD-10-CM

## 2020-10-15 DIAGNOSIS — D64.9 ANEMIA, UNSPECIFIED TYPE: ICD-10-CM

## 2020-10-15 LAB
ALBUMIN SERPL-MCNC: 3.8 G/DL (ref 3.5–5)
ALBUMIN UR-MCNC: NEGATIVE MG/DL
ALP SERPL-CCNC: 66 U/L (ref 45–120)
ALT SERPL W P-5'-P-CCNC: 15 U/L (ref 0–45)
ANION GAP SERPL CALCULATED.3IONS-SCNC: 9 MMOL/L (ref 5–18)
APPEARANCE UR: CLEAR
APTT PPP: 28 SECONDS (ref 24–37)
AST SERPL W P-5'-P-CCNC: 16 U/L (ref 0–40)
BASOPHILS # BLD AUTO: 0.1 THOU/UL (ref 0–0.2)
BASOPHILS NFR BLD AUTO: 1 % (ref 0–2)
BILIRUB SERPL-MCNC: 0.4 MG/DL (ref 0–1)
BILIRUB UR QL STRIP: NEGATIVE
BUN SERPL-MCNC: 12 MG/DL (ref 8–22)
CALCIUM SERPL-MCNC: 9.3 MG/DL (ref 8.5–10.5)
CHLORIDE BLD-SCNC: 105 MMOL/L (ref 98–107)
CO2 SERPL-SCNC: 25 MMOL/L (ref 22–31)
COLOR UR AUTO: YELLOW
CREAT SERPL-MCNC: 0.73 MG/DL (ref 0.6–1.1)
EOSINOPHIL # BLD AUTO: 0.5 THOU/UL (ref 0–0.4)
EOSINOPHIL NFR BLD AUTO: 6 % (ref 0–6)
ERYTHROCYTE [DISTWIDTH] IN BLOOD BY AUTOMATED COUNT: 14.8 % (ref 11–14.5)
GFR SERPL CREATININE-BSD FRML MDRD: >60 ML/MIN/1.73M2
GLUCOSE BLD-MCNC: 78 MG/DL (ref 70–125)
GLUCOSE UR STRIP-MCNC: NEGATIVE MG/DL
HCG UR QL: NEGATIVE
HCT VFR BLD AUTO: 39.6 % (ref 35–47)
HGB BLD-MCNC: 12.5 G/DL (ref 12–16)
HGB UR QL STRIP: NEGATIVE
INR PPP: 0.98 (ref 0.9–1.1)
KETONES UR STRIP-MCNC: NEGATIVE MG/DL
LEUKOCYTE ESTERASE UR QL STRIP: NEGATIVE
LYMPHOCYTES # BLD AUTO: 3.2 THOU/UL (ref 0.8–4.4)
LYMPHOCYTES NFR BLD AUTO: 40 % (ref 20–40)
MCH RBC QN AUTO: 22.9 PG (ref 27–34)
MCHC RBC AUTO-ENTMCNC: 31.4 G/DL (ref 32–36)
MCV RBC AUTO: 73 FL (ref 80–100)
MONOCYTES # BLD AUTO: 0.9 THOU/UL (ref 0–0.9)
MONOCYTES NFR BLD AUTO: 11 % (ref 2–10)
NEUTROPHILS # BLD AUTO: 3.3 THOU/UL (ref 2–7.7)
NEUTROPHILS NFR BLD AUTO: 42 % (ref 50–70)
NITRATE UR QL: NEGATIVE
PH UR STRIP: 7 [PH] (ref 5–8)
PLATELET # BLD AUTO: 246 THOU/UL (ref 140–440)
PMV BLD AUTO: 8.7 FL (ref 7–10)
POTASSIUM BLD-SCNC: 4.4 MMOL/L (ref 3.5–5)
PROT SERPL-MCNC: 7.2 G/DL (ref 6–8)
RBC # BLD AUTO: 5.45 MILL/UL (ref 3.8–5.4)
SODIUM SERPL-SCNC: 139 MMOL/L (ref 136–145)
SP GR UR STRIP: 1.01 (ref 1–1.03)
UROBILINOGEN UR STRIP-ACNC: NORMAL
WBC: 7.9 THOU/UL (ref 4–11)

## 2020-10-15 ASSESSMENT — MIFFLIN-ST. JEOR: SCORE: 1832.86

## 2020-10-16 ENCOUNTER — COMMUNICATION - HEALTHEAST (OUTPATIENT)
Dept: SURGERY | Facility: CLINIC | Age: 30
End: 2020-10-16

## 2020-10-16 LAB
ATRIAL RATE - MUSE: 84 BPM
DIASTOLIC BLOOD PRESSURE - MUSE: NORMAL
INTERPRETATION ECG - MUSE: NORMAL
P AXIS - MUSE: 56 DEGREES
PR INTERVAL - MUSE: 174 MS
QRS DURATION - MUSE: 72 MS
QT - MUSE: 368 MS
QTC - MUSE: 434 MS
R AXIS - MUSE: 64 DEGREES
SYSTOLIC BLOOD PRESSURE - MUSE: NORMAL
T AXIS - MUSE: 33 DEGREES
VENTRICULAR RATE- MUSE: 84 BPM

## 2020-10-24 ENCOUNTER — AMBULATORY - HEALTHEAST (OUTPATIENT)
Dept: FAMILY MEDICINE | Facility: CLINIC | Age: 30
End: 2020-10-24

## 2020-10-24 DIAGNOSIS — Z11.59 ENCOUNTER FOR SCREENING FOR OTHER VIRAL DISEASES: ICD-10-CM

## 2020-10-26 ENCOUNTER — COMMUNICATION - HEALTHEAST (OUTPATIENT)
Dept: SCHEDULING | Facility: CLINIC | Age: 30
End: 2020-10-26

## 2020-10-27 ENCOUNTER — COMMUNICATION - HEALTHEAST (OUTPATIENT)
Dept: SCHEDULING | Facility: CLINIC | Age: 30
End: 2020-10-27

## 2020-10-27 ENCOUNTER — SURGERY - HEALTHEAST (OUTPATIENT)
Dept: SURGERY | Facility: CLINIC | Age: 30
End: 2020-10-27

## 2020-10-27 ENCOUNTER — ANESTHESIA - HEALTHEAST (OUTPATIENT)
Dept: SURGERY | Facility: CLINIC | Age: 30
End: 2020-10-27

## 2020-10-27 ASSESSMENT — MIFFLIN-ST. JEOR
SCORE: 1767.37
SCORE: 1782.11

## 2020-10-29 ENCOUNTER — AMBULATORY - HEALTHEAST (OUTPATIENT)
Dept: SURGERY | Facility: CLINIC | Age: 30
End: 2020-10-29

## 2020-10-30 ENCOUNTER — COMMUNICATION - HEALTHEAST (OUTPATIENT)
Dept: SURGERY | Facility: CLINIC | Age: 30
End: 2020-10-30

## 2020-11-03 ENCOUNTER — AMBULATORY - HEALTHEAST (OUTPATIENT)
Dept: SURGERY | Facility: CLINIC | Age: 30
End: 2020-11-03

## 2020-11-03 DIAGNOSIS — Z98.84 BARIATRIC SURGERY STATUS: ICD-10-CM

## 2020-11-03 DIAGNOSIS — Z71.3 NUTRITIONAL COUNSELING: ICD-10-CM

## 2020-11-13 ENCOUNTER — OFFICE VISIT - HEALTHEAST (OUTPATIENT)
Dept: SURGERY | Facility: CLINIC | Age: 30
End: 2020-11-13

## 2020-11-13 DIAGNOSIS — Z48.89 POSTOPERATIVE VISIT: ICD-10-CM

## 2020-11-22 ENCOUNTER — HEALTH MAINTENANCE LETTER (OUTPATIENT)
Age: 30
End: 2020-11-22

## 2020-11-27 ENCOUNTER — OFFICE VISIT - HEALTHEAST (OUTPATIENT)
Dept: SURGERY | Facility: CLINIC | Age: 30
End: 2020-11-27

## 2020-11-27 ENCOUNTER — COMMUNICATION - HEALTHEAST (OUTPATIENT)
Dept: SURGERY | Facility: CLINIC | Age: 30
End: 2020-11-27

## 2020-11-27 DIAGNOSIS — Z98.84 BARIATRIC SURGERY STATUS: ICD-10-CM

## 2020-11-27 DIAGNOSIS — Z71.3 NUTRITIONAL COUNSELING: ICD-10-CM

## 2021-01-29 ENCOUNTER — COMMUNICATION - HEALTHEAST (OUTPATIENT)
Dept: SURGERY | Facility: CLINIC | Age: 31
End: 2021-01-29

## 2021-01-29 ENCOUNTER — OFFICE VISIT - HEALTHEAST (OUTPATIENT)
Dept: SURGERY | Facility: CLINIC | Age: 31
End: 2021-01-29

## 2021-01-29 DIAGNOSIS — Z71.3 NUTRITIONAL COUNSELING: ICD-10-CM

## 2021-01-29 DIAGNOSIS — E66.811 OBESITY, CLASS I, BMI 30.0-34.9 (SEE ACTUAL BMI): ICD-10-CM

## 2021-01-29 DIAGNOSIS — Z98.84 BARIATRIC SURGERY STATUS: ICD-10-CM

## 2021-01-29 ASSESSMENT — MIFFLIN-ST. JEOR: SCORE: 1575.44

## 2021-03-05 ENCOUNTER — OFFICE VISIT - HEALTHEAST (OUTPATIENT)
Dept: FAMILY MEDICINE | Facility: CLINIC | Age: 31
End: 2021-03-05

## 2021-03-05 ENCOUNTER — COMMUNICATION - HEALTHEAST (OUTPATIENT)
Dept: SCHEDULING | Facility: CLINIC | Age: 31
End: 2021-03-05

## 2021-03-05 DIAGNOSIS — Z32.01 PREGNANCY CONFIRMED BY POSITIVE URINE TEST: ICD-10-CM

## 2021-03-05 DIAGNOSIS — Z32.00 POSSIBLE PREGNANCY: ICD-10-CM

## 2021-03-05 LAB — HCG UR QL: POSITIVE

## 2021-03-05 ASSESSMENT — PATIENT HEALTH QUESTIONNAIRE - PHQ9: SUM OF ALL RESPONSES TO PHQ QUESTIONS 1-9: 0

## 2021-03-05 ASSESSMENT — MIFFLIN-ST. JEOR: SCORE: 1602.66

## 2021-04-08 ENCOUNTER — AMBULATORY - HEALTHEAST (OUTPATIENT)
Dept: MATERNAL FETAL MEDICINE | Facility: HOSPITAL | Age: 31
End: 2021-04-08

## 2021-04-08 DIAGNOSIS — O26.90 PREGNANCY, ANTEPARTUM, COMPLICATIONS: ICD-10-CM

## 2021-04-12 ENCOUNTER — TRANSCRIBE ORDERS (OUTPATIENT)
Dept: MATERNAL FETAL MEDICINE | Facility: CLINIC | Age: 31
End: 2021-04-12

## 2021-04-12 DIAGNOSIS — O26.90 PREGNANCY RELATED CONDITION, ANTEPARTUM: Primary | ICD-10-CM

## 2021-04-12 DIAGNOSIS — O09.899 HISTORY OF PRETERM DELIVERY, CURRENTLY PREGNANT: ICD-10-CM

## 2021-04-12 DIAGNOSIS — O99.840 PREGNANCY COMPLICATED BY PREVIOUS GASTRIC BYPASS, ANTEPARTUM: Primary | ICD-10-CM

## 2021-04-20 ENCOUNTER — COMMUNICATION - HEALTHEAST (OUTPATIENT)
Dept: SURGERY | Facility: CLINIC | Age: 31
End: 2021-04-20

## 2021-04-20 DIAGNOSIS — E55.9 VITAMIN D DEFICIENCY: ICD-10-CM

## 2021-04-20 DIAGNOSIS — Z90.3 POSTGASTRECTOMY MALABSORPTION: ICD-10-CM

## 2021-04-20 DIAGNOSIS — D50.9 IRON DEFICIENCY ANEMIA, UNSPECIFIED IRON DEFICIENCY ANEMIA TYPE: ICD-10-CM

## 2021-04-20 DIAGNOSIS — Z98.84 S/P BARIATRIC SURGERY: ICD-10-CM

## 2021-04-20 DIAGNOSIS — K91.2 POSTGASTRECTOMY MALABSORPTION: ICD-10-CM

## 2021-04-22 ENCOUNTER — PRE VISIT (OUTPATIENT)
Dept: MATERNAL FETAL MEDICINE | Facility: CLINIC | Age: 31
End: 2021-04-22

## 2021-04-26 NOTE — PROGRESS NOTES
"[facility] Consult Note  Date: 21      Reason for consult: History of  delivery and mid-trimester loss of twins  Referring Provider: Lali Humphries MD     HPI:?Russell Gomez is 31 year old  a 13w2d by LMP c/w 8w3d US who presents to clinic for consultation regarding history of 31w  birth, history of 21w1d loss of twins and recent bariatric surgery (10/2020).    May's first pregnancy was complicated by  delivery at 31 0/7 weeks gestation in . With  delivery, (in [facility] 2/3/11), she presented to the ED at 30w6d with vaginal bleeding, abdominal pain, and  \"low fluid\" on US; she was noted to be 4cm/100/BBOW. She then progressed from 5 to 6cm and was artificially ruptured and augmented after a course of BMZ. May delivered a healthy male boy.  Ophelia had to be in the NICU for a while but has done really well and has no developmental concerns; he has ADHD.     May's second pregnancy was a di-di twin gestation. She was admitted ([facility], 19 - in [facility]) after ultrasound in clinic noted funneling on US without measurable cervix at around 20 weeks gestation. She was 1-2 cm dilated at that time. She had no contractions, cramping or loss of fluid. She was to be evaluated for a cerclage but was already 4 cm with protruding membranes after admission to the hospital. Patient reports she was disappointed that she found out about the stitch only after it was too late.  May recalls that she had a lot of stress at that time during her whole twin pregnancy and she didn't feel quite right. May was managed expectantly and subsequently experienced PPROM and delivered at 21 1/7 weeks gestation. The placental pathology showed mild acute subchorioamnionitis.    May has a history of gestational diabetes in her second pregnancy, but was not treated with insulin. She does not remember any other medical conditions during pregnancy. After the loss of her twins she " wanted to turn her life around so she started exercising, received bariatric surgery and has tried to lead a more healthy lifestyle. She really enjoys dancing with her kids (son Jonathan 10 and step daughter 16yo). She also has been cooking more and making smoothies. Regarding her mental health, she is now doing much better and is optimistic about this pregnancy. She wants to do everything she can for her baby and is open to any interventions that are available and that will help her not have another poor outcome.     She has been experiencing nausea and vomiting though it is considerably better since she started using the suppositories. She does not have a scale at home and does not weigh herself but reports her starting weight was 263lb prior to her surgery. She is down 84lb since her surgery. Has not yet started gaining weight from pregnancy. May still vomits occasionally, but it is not daily anymore. Yesterday she did not vomit at all, today she has been more nauseated, and states she has vomited about 3x while in clinic, mostly watery spit, some green liquid. This morning she had a bagel with low fat cream cheese and two strips of keen. She kept that meal down. She also loves eggs so eats eggs a lot. She also eats ramen noodles and soup frequently. Earlier in her pregnancy took in a lot of protein smoothies but now she notes her baby does not like that, so she is unable to drink smoothies. She takes a stool softener and reports she has daily bowel movements.       Pregnancy notable for:   - Hx of gastric sleeve surgery 10/27/2020  - Hx of  delivery x2  - Nausea and vomiting  - GERD   - Hx of GDMA1  Diagnosis in outside records, but not reviewed with patient:   - Depression  - ASCUS  - HSV  - Rh negative  - Asthma    OB History:   s/p   OB History    Para Term  AB Living   5 2 0 2 2 1   SAB TAB Ectopic Multiple Live Births   1 0 0 1 3      # Outcome Date GA Lbr Gagan/2nd Weight Sex  Delivery Anes PTL Lv   5 Current            4A  19 21w1d   M    ND      Birth Comments: PPROM twins   4B  19    M    ND      Birth Comments: PPROM twins   3  11 31w0d   M Vag-Spont   EDEN      Name: MONA HOPKINS ( RERE)      Apgar1: 9  Apgar5: 9   2 SAB            1 AB                 GYN History:  - LMP: Patient's last menstrual period was 2021.     Past Medical History:   Hx of gastric sleeve surgery   - Hx of  delivery x2  - Nausea and vomiting  - GERD   - Hx GDMA1  Diagnosis in outside records, but not reviewed with patient:   - Depression  - ASCUS  - HSV  - Rh negative  - Asthma     No results found for: PAP   History of abnormal Pap smear: Yes: ASCUS positive per outside records    Past Surgical History:  - Hx of gastric sleeve surgery 10/27/2020     Social History:  - Lives with 10 year old son Damian and 17 year old step daughter and current partner. Not currently working. No alcohol use, tobacco use or recreational drug use.     Family History:     She denies a family history of motor/intellectual impairment, stillbirth, genetic or chromosome abnormalities or congenital anomalies.   No known family history of a bleeding or clotting disorder.      Partner History:    Regarding her partner's history she does not think that he denies a family history of motor/intellectual impairment, stillbirth, genetic or chromosome abnormalities or congenital anomalies.       Medications:  - Prenatal gummy vitamin   - Compazine suppository   - ondansetron ODT - pt reports not taking     Allergies:  - NKDA    ROS: 10 point review of systems negative except as noted in the HPI    Data Reviewed:      Current - Weight 178 lb 11.2 oz height 5ft 4 in BMI 30.7  o Weights  -  21: 193lb  - 21 179lbs     PNL - prenatal laboratory results are not available for the review during the consultation.    Today's [facility] Ultrasound:  1) Ward intrauterine pregnancy at 13w 3d  gestational age.  2) The nuchal translucency measurement is within the normal range.  3) The nasal bone was visualized.    Physical examination was deferred at this time.     Summary/Recommendations:  Russell Gomez is 31 year old  a 13w2d by LMP c/w 8w3d US who presents to clinic for consultation regarding history of 31w  birth, history of 19w fetal demise and recent bariatric surgery (10/2020) and history of GDMA1. Pregnancy is otherwise notable for asthma, rh negative, depression, ASCUS and HSV (these diagnoses were not confirmed with patient today). She is experiencing some GERD and nausea and vomiting which she reports has significantly improved after adding in suppository.     Recommendations:?    Hx of PTB at 31w0d and PTB twins at 21w1d  Today we discussed the incidence and epidemiology of  birth (PTB).  There are multiple etiologies of spontaneous PTB, including but not limited to infection, bleeding, uterine distension, cervical insufficiency and stress.   However, most spontaneous  deliveries occur in patients with no risk factors.  A history of prior  delivery is a significant risk factor for recurrence in a subsequent pregnancy.  Recurrent  delivery has been linked to maternal ethnicity, genitourinary infection, and especially gestational age at the first  delivery: the earlier the delivery, the greater the likelihood of recurrence.  We discussed that recurrent  birth can happen at the same gestational age as a prior  birth but that it cannot be predicted and it could recur at an earlier gestational age. We discussed the increased  morbidity and mortality with prematurity which is the rationale for trying to prevent recurrent  birth.      We also discussed the concept of cervical insufficiency which is another cause of  birth and is classically described as painless cervical dilation. While May did experience painless  cervical dilation in the mid-trimester in her last pregnancy, the pregnancy was complicated by a twin gestation and therefore we cannot attribute this finding solely to cervical incompetence. We discussed the recommendation for close cervical length surveillance in the mid-trimester (weekly from 15 weeks through 24 weeks gestation) with placement of a cervical cerclage if cervical shortening of <2.5 cm is detected prior to 24 weeks gestation.    We also discussed the data regarding the use of weekly intramuscular injections of 17 hydroxyprogesterone caproate (17-OHP) supplementation as a means to modify the risk of recurrent  birth.  A multicenter, double-blind randomized controlled trial found a 34% reduction in recurrent  birth <37 weeks (Danis et al, 2003) with the use of 17-OHP .  This trial also found a significant reduction in recurrent  birth <35 and <32 weeks.  A more recent international, double-blind randomized controlled trial (PROLONG) found no reduction in the rate of recurrent  birth <35 weeks ([place] et al, 2019), calling into question the established use of 17-OHP.  Importantly, both studies indicate that 17-OHP is safe, at least in the short term.       The Society for Maternal Fetal Medicine interprets these disparate results as possibly partially related to the different populations in the two studies (59% black in [facility] versus 90% white in [place]; 50%  in [facility] versus 90% in [place]; 20% tobacco use in [facility] versus 8% in [place]; 32% had more than one prior PTB in [facility] versus 12% in [place]; 91% with at least on additional risk factor for PTB in [facility] versus 48% in [place]).  The current conclusion of [facility] is that it is reasonable to offer 17-OHP to women with a profile more representative of the very high risk patient, but that all women at risk of recurrent  birth should have a discussion of the risks/benefits and undergo a shared  decision-making process.    After the publication of the [place] (PROLONG) study an FDA Advisory Committee recommended withdrawing Venturia (17 - OHP) off the market. The FDA decision is still pending.  [facility] did not change their position.    After a discussion of the above Ms. Gomez opts for both weekly Venturia injections, as well as cervical length surveillance.    Hx of Gastric bypass 10/2020  Nausea and vomiting  GERD  Pregnancy after bariatric surgery may be less likely to be complicated by gestational diabetes, hypertension, and macrosomia as compared to a pregnancy complicated by obesity.  Bariatric surgical procedures are categorized into two main types; malabsorptive procedures (jejuno-ileal bypass and biliopancreatic diversion) and restrictive procedures (gastric banding and vertical banded gastroplasty).  Both types of procedures can result in deficiencies in iron, vitamin B12, folate, and calcium.  Disorders of fetal growth may occur.       In Ms. Gomez's case, as her surgery was ~6 months ago, and she has already experienced dramatic weight loss. Discussed that her goal this pregnancy is to maintain her current weight. She does not have to gain weight but she should not lose weight either. She has remained at a stable weight from today's visit weight 178lb 11.2 oz from last visit in outside records on 4/8 179lb. Patient remembers that her starting weight prior to surgery was 263lb. Down 84lb since October. Her BMI is now within the class I obesity range. Commended her exercise regimen of akhil, dancing and walking. Encouraged her that it is safe to continue with these moderate physical activities throughout her pregnancy.      Discussed the importance of following up with a nutritionist and consuming more proteins and fruits and vegetables which Ms. Gomez is working on doing. Discussed optimization of her anti-emetic and constipation regimen; she seems to have had a lot of success with the compazine  suppository. Recommended that she continue using that as well as other medications (unisom, B12, zofran) as prescribed by her primary OBGYN. Encouraged her to try these medications as they often work and are safe in pregnancy.  Also recommend follow up with surgeon and nutritionist and to have them draw labs to assess micronutrients (vitamin B12, vitamin D, calcium, iron, folic acid). Patient has a visit planned for 4/30/21 with surgeon.     Recommendations:    Consultation with a nutritionist to assess and make recommendations      Vitamin supplementation: vitamin B12, vitamin D, calcium, iron, folic acid and labs to confirm that these levels are sufficient. Recommend checking vitamin levels at least once per trimester.    Weekly cervical length assessments beginning at 15 weeks and continuing through 24 weeks gestation. If there is cervical shortening to <2.5 cm prior to 24 weeks gestation then recommend placement of a cervical cerclage. We presume the cervical length assessments will be scheduled in your office. Please let us know if you prefer to have them performed in our office.    Comprehensive ultrasound at 18-20 weeks. This appointment has been scheduled in our office on 6/1/2021.    Serial ultrasound assessment of fetal weight after 24 weeks gestation (every 4-6 weeks).    Early screening for gestational diabetes in the 1st trimester; repeat in the 3rd trimester if normal; Due to inability to tolerate the typical glucose load, recommend screening with fingerstick glucose (fasting and postprandial) over one week period of time.    As long as fetal and maternal well-being are reassuring, timing and mode of delivery recommendations are as per obstetric recommendations     Optimize modifiable risk factors: smoking cessation, maintenance of adequate nutrition and avoidance of further weight loss during pregnancy.    Optimization of antiemetics and GERD medications    17-hydroxyprogesterone caproate 250mg IM  weekly from 16-36 weeks' gestation.    Administration of  corticosteroids if the patient is deemed to be at increased risk of imminent  delivery.    Urine culture every trimester with aggressive treatment of bacteriuria.    Clinical evaluation of  labor symptoms.        Patient seen and discussed with Dr. Imelda Frazier MD, [facility] PGY-2    21 3:34 PM     Physician Attestation   I, Imelda Kaminski MD, saw this patient with the resident and agree with the resident/fellow's findings and plan of care as documented in the note.      I personally reviewed vital signs, medications, labs and imaging.    Bonilla findings: May is a  at 13 3/7 weeks gestation with pregnancy complicated by a history of  delivery at 31 weeks gestation,  delivery of twins at 21 1/7 weeks gestation, as well as recent gastric bypass surgery.    Please see above for details of the counseling. Recommend serial cervical length surveillance given the history of prior  delivery at 31 weeks gestation, as well as the history of delivery of twin gestation at 21 1/7 weeks gestation. Given in her last pregnancy she experienced cervical shortening and dilation as early as 19 weeks gestation I would recommend more intensive cervical length surveillance (weekly beginning at 15 weeks gestation) in the current pregnancy, with placement of a cervical cerclage if there is any evidence of cervical shortening to <2.5 cm. Additionally, I would recommend starting 17-OH progesterone 250 mg weekly at 16 weeks gestation.     We also discussed her recent weight loss surgery. Recommend that she meet with nutritionist with goal to at least maintain her weight or gain 11-20 pounds for the duration of the pregnancy. We also discussed the importance of screening for micronutrient deficiency with replacement as needed. May has a scheduled follow up with her bariatric  surgery team on 4/30/202.    Imelda Kaminski MD  Date of Service (when I saw the patient): 4/27/21    TT: 40

## 2021-04-27 ENCOUNTER — OFFICE VISIT (OUTPATIENT)
Dept: MATERNAL FETAL MEDICINE | Facility: CLINIC | Age: 31
End: 2021-04-27
Attending: OBSTETRICS & GYNECOLOGY
Payer: COMMERCIAL

## 2021-04-27 ENCOUNTER — HOSPITAL ENCOUNTER (OUTPATIENT)
Dept: ULTRASOUND IMAGING | Facility: CLINIC | Age: 31
End: 2021-04-27
Attending: OBSTETRICS & GYNECOLOGY
Payer: COMMERCIAL

## 2021-04-27 VITALS — WEIGHT: 178.7 LBS

## 2021-04-27 DIAGNOSIS — O99.840 PREGNANCY COMPLICATED BY PREVIOUS GASTRIC BYPASS, ANTEPARTUM: ICD-10-CM

## 2021-04-27 DIAGNOSIS — Z31.5 ENCOUNTER FOR PROCREATIVE GENETIC COUNSELING: Primary | ICD-10-CM

## 2021-04-27 DIAGNOSIS — O09.899 HISTORY OF PRETERM DELIVERY, CURRENTLY PREGNANT: ICD-10-CM

## 2021-04-27 DIAGNOSIS — Z3A.13 13 WEEKS GESTATION OF PREGNANCY: ICD-10-CM

## 2021-04-27 PROCEDURE — 76813 OB US NUCHAL MEAS 1 GEST: CPT

## 2021-04-27 PROCEDURE — 99243 OFF/OP CNSLTJ NEW/EST LOW 30: CPT | Mod: 25 | Performed by: OBSTETRICS & GYNECOLOGY

## 2021-04-27 PROCEDURE — 76813 OB US NUCHAL MEAS 1 GEST: CPT | Mod: 26 | Performed by: OBSTETRICS & GYNECOLOGY

## 2021-04-27 PROCEDURE — 96040 HC GENETIC COUNSELING, EACH 30 MINUTES: CPT | Performed by: GENETIC COUNSELOR, MS

## 2021-04-27 NOTE — NURSING NOTE
Russell presents to MFM at 13w3d for GC/NT/MFM consult due to history of gastric bypass and PTD. Dr Kaminski and Dr Frazier met with patient. Plan for L2/TV at 18-20 weeks with M and weekly TV's with her primary OB starting at 15 or 16 weeks. Discharged ambulatory and stable.    Prema Batres RN

## 2021-04-27 NOTE — PROGRESS NOTES
[facility]  Genetic Counseling Consult    Patient: Russell Gomez YOB: 1990   Date of Service: 21      Russell Gomez (May) was seen at the [facility] for an NT ultrasound, MFM consultation, and genetic consultation as part of her appointment.  The indication for genetic counseling is prior pregnancy loss.       Impression/Plan:   1.  Russell had a genetic counseling session, where she declined NIPT, first trimester screening and carrier screening.    2.  Russell had an MFM consult today to discuss her complex OB history and medical history.  Please see corresponding documentation for details and recommendations for pregnancy management.    2.  Maternal serum AFP (single marker screen) is recommended after 15 weeks to screen for open neural tube defects. A quad screen should not be performed.    3.  Russell indicated she would be interested in having a follow up conversation via the phone with a  to discuss her stress regarding the risk of recurrent pregnancy loss.    Pregnancy History:   /Parity:    Age at Delivery: 31 year old  MEREDITH: 10/30/2021, by Last Menstrual Period  Gestational Age: 13w3d    No significant complications or exposures were reported in the current pregnancy.    Russell s pregnancy history is significant for 5 prior pregnancies. As reported by Russell:  o The first two pregnancies resulted in therapeutically induced abortions  o The third pregnancy was a male born prematurely at 31w0d. She reports he is doing well now  o The fourth pregnancy was two male twins; Russell was in PPROM at 19 weeks, and lost both of the twins at 20 weeks.    She is currently affected by gestational diabetes, and was in her previous pregnancy as well.        Russell indicates that she is very anxious about premature birth and pregnancy loss for her current pregnancy. She claims thinking  about the risk that it may happen worries her, and she fears that her anxiety is what's causing her pregnancy complications, and believes avoiding being anxious is beneficial to reducing her risk    Due to Russell's recent gastric surgery (see medical history) she is also concerned about her recent weight loss and its potential risks to pregnancy, this topic was part of her consult with [facility] physician Dr. Kaminski today, please see corresponding documentation for complete details.    We discussed with Russell that we understood her concerns, and that the OB specialist will be able to address some of them; we also offered her the ability to speak to a  about her anxieties regarding the pregnancy, which she agreed would be helpful and accepted.      Medical History:   Russell vazquez reported medical history significant or the following factors.      Gestational diabetes in current pregnancy    Recent gastric bypass surgery; patient reports losing ~90 lb since surgery in October and reports continuing to lose weight since becoming pregnant.  Russell has a follow up schedule with her gastric sleeve surgeon later this week.     Frequent nausea, patient indicates she might vomit several times a day, and is currently not taking anti-nausea medication         Family History:   A three-generation pedigree was obtained, and is scanned under the  Media  tab; please see for more detail.      Russell was particularly nauseous, and unable to express herself to any great extent at the time of collecting her family history. She did not report any factors that indicate a change in care or noticeable risk for a condition related to genetics.    Russell indicated that she did not know much about the FOB's family, other than that his mother passed away and was on dialysis at the time    Otherwise, the reported family history is negative for multiple miscarriages, stillbirths, birth defects, cognitive impairment, known  genetic conditions, and consanguinity.       Carrier Screening:   The patient reports that she is of  ancestry:      The hemoglobinopathies are a group of genetic blood diseases that occur with increased frequency in individuals of  ancestry and carrier screening for these conditions is available.  Carrier screening for the hemoglobinopathies includes a CBC with red blood cell indices, a ferritin level, and a quantitative hemoglobin electrophoresis or HPLC.  In addition,  screening in the [facility] includes many of the hemoglobinopathies.      Expanded carrier screening for mutations in a large panel of genes associated with autosomal recessive conditions including cystic fibrosis, spinal muscular atrophy, and others, is now available.      The patient has declined the carrier screening options reviewed today.       Risk Assessment for Chromosome Conditions:   We explained that the risk for fetal chromosome abnormalities increases with maternal age. We discussed specific features of common chromosome abnormalities, including Down syndrome, trisomy 13, trisomy 18, and sex chromosome trisomies.      - At age 31 at delivery, the risk to have a baby with Down syndrome is 1 in 909.     - At age 31 at delivery, the risk to have a baby with any chromosome abnormality is 1 in 355.          Testing Options:   We discussed the following options:   First trimester screening    First trimester ultrasound with nuchal translucency and nasal bone assessments, maternal plasma hCG, BRENNON-A, and AFP measurement    Screens for fetal trisomy 21, trisomy 13, and trisomy 18    Cannot screen for open neural tube defects; maternal serum AFP after 15 weeks is recommended       Non-invasive Prenatal Testing (NIPT)    Maternal plasma cell-free DNA testing; first trimester ultrasound with nuchal translucency and nasal bone assessment is recommended, when appropriate    Screens for fetal trisomy  21, trisomy 13, trisomy 18, and sex chromosome aneuploidy    Cannot screen for open neural tube defects; maternal serum AFP after 15 weeks is recommended       Chorionic villus sampling (CVS)    Invasive procedure typically performed in the first trimester by which placental villi are obtained for the purpose of chromosome analysis and/or other prenatal genetic analysis    Diagnostic results; >99% sensitivity for fetal chromosome abnormalities    Cannot test for open neural tube defects; maternal serum AFP after 15 weeks is recommended       Genetic Amniocentesis    Invasive procedure typically performed in the second trimester by which amniotic fluid is obtained for the purpose of chromosome analysis and/or other prenatal genetic analysis    Diagnostic results; >99% sensitivity for fetal chromosome abnormalities    AFAFP measurement tests for open neural tube defects       Comprehensive (Level II) ultrasound: Detailed ultrasound performed between 18-22 weeks gestation  to screen for major birth defects and markers for aneuploidy      We reviewed the benefits and limitations of this testing.  Screening tests provide a risk assessment specific to the pregnancy for certain fetal chromosome abnormalities, but cannot definitively diagnose or exclude a fetal chromosome abnormality.  Follow-up genetic counseling and consideration of diagnostic testing is recommended with any abnormal screening result.     Diagnostic tests carry inherent risks- including risk of miscarriage- that require careful consideration.  These tests can detect fetal chromosome abnormalities with greater than 99% certainty.  Results can be compromised by maternal cell contamination or mosaicism, and are limited by the resolution of cytogenetic G-banding technology.  There is no screening nor diagnostic test that can detect all forms of birth defects or mental disability.     It was a pleasure to be involved with [facility]. Face-to-face time of  the meeting was 65 minutes.    Juliocesar Freire, Genetic Counseling Student    Patient seen, evaluated and discussed with the Genetic Counseling Student. I have verified the content of the note, which accurately reflects my assessment of the patient and the plan of care.   Supervising Genetic Counselor   Kyle Helton MS, [facility]   Maternal Fetal Medicine  [facility]   Phone: [phone]   Email: [email or web address]

## 2021-04-30 ENCOUNTER — OFFICE VISIT - HEALTHEAST (OUTPATIENT)
Dept: SURGERY | Facility: CLINIC | Age: 31
End: 2021-04-30

## 2021-04-30 ENCOUNTER — COMMUNICATION - HEALTHEAST (OUTPATIENT)
Dept: SURGERY | Facility: CLINIC | Age: 31
End: 2021-04-30

## 2021-04-30 DIAGNOSIS — K91.2 POSTOPERATIVE MALABSORPTION: ICD-10-CM

## 2021-04-30 DIAGNOSIS — Z33.1 PREGNANT STATE, INCIDENTAL: ICD-10-CM

## 2021-04-30 ASSESSMENT — MIFFLIN-ST. JEOR: SCORE: 1507.4

## 2021-05-18 ENCOUNTER — OFFICE VISIT - HEALTHEAST (OUTPATIENT)
Dept: SURGERY | Facility: CLINIC | Age: 31
End: 2021-05-18

## 2021-05-18 DIAGNOSIS — Z98.84 BARIATRIC SURGERY STATUS: ICD-10-CM

## 2021-05-18 DIAGNOSIS — E66.811 OBESITY, CLASS I, BMI 30.0-34.9 (SEE ACTUAL BMI): ICD-10-CM

## 2021-05-18 DIAGNOSIS — Z71.3 NUTRITIONAL COUNSELING: ICD-10-CM

## 2021-05-18 ASSESSMENT — MIFFLIN-ST. JEOR: SCORE: 1516.47

## 2021-05-26 ASSESSMENT — PATIENT HEALTH QUESTIONNAIRE - PHQ9: SUM OF ALL RESPONSES TO PHQ QUESTIONS 1-9: 9

## 2021-05-27 VITALS — BODY MASS INDEX: 30.73 KG/M2 | HEIGHT: 64 IN | WEIGHT: 180 LBS

## 2021-05-27 ASSESSMENT — PATIENT HEALTH QUESTIONNAIRE - PHQ9: SUM OF ALL RESPONSES TO PHQ QUESTIONS 1-9: 0

## 2021-05-28 ASSESSMENT — ASTHMA QUESTIONNAIRES
ACT_TOTALSCORE: 24
ACT_TOTALSCORE: 25

## 2021-05-28 NOTE — PROGRESS NOTES
"  Subjective:    Russell Gomez is a 29 y.o. female who presents for evaluation of follow-up syncope.  She is currently approximately 17 weeks gestation.  She is pregnant with twins.  She has morbid obesity.  I saw her for initial positive pregnancy test.  She was referred to OB/GYN due to high risk pregnancy.  She reports she is seeing [facility], Dr. Dozier.  She has had several visits with Dr. Dozier thus far.  Patient reports that generally pregnancy is going well.  No hypertension, no diabetes, no other abnormal findings or concerns.  She is feeling the babies move.  Saturday, 6 days ago, she was at the grocery store.  She was paying for groceries and started to feel dizzy.  She held onto something and then fainted.  She was at [facility] on Tuesday the 16th.  She got up and was going down the escobar to the bathroom and the same thing happened, she felt dizzy and then fainted.  She had some bloodwork done at [facility] after she fainted.  I am unable to view that today.  Patient says she was told she was not dehydrated.  She also had an EKG done.  I reviewed those results today and EKG was grossly normal, confirmed by cardiology.  She was then told to follow-up with her family medicine doctor.    She has only had 2 episodes of syncope.  In between those 2 episodes she has felt normal.  Today she feels fine, except significantly tired.  She says during her previous pregnancy she \"lost vision in one eye.\"  When this happened, she went to the ER she was given some eyedrops, and then vision returned.  Currently, she is otherwise feeling fine.  She has a little nausea and vomiting at times.  She has never really had problems with syncope before.  She has not noticed any edema in her ankles or feet, occasional mild headaches.  No dysuria, no fevers.  No chest pain or difficulty breathing.  She notes that she has some mild heartburn off and on.  This has been going on since the beginning of her " pregnancy.    Patient Active Problem List   Diagnosis     Morbid obesity (H)     Intermittent asthma     Herpes Simplex     Abnormal Pap smear of cervix     Adjustment disorder with depressed mood     Low back pain     Depression     Anemia     Fatigue     Acanthosis nigricans     Hirsutism     Vitamin D deficiency     Pregnancy test positive     History of  delivery, currently pregnant in first trimester       Current Outpatient Medications:      albuterol (PROAIR HFA) 90 mcg/actuation inhaler, Inhale 1-2 puffs every 4 (four) hours as needed for wheezing., Disp: 1 Inhaler, Rfl: 1     cholecalciferol, vitamin D3, 2,000 unit Chew, Chew 1 tablet daily., Disp: , Rfl:      doxylamine (UNISOM, DOXYLAMINE,) 25 mg tablet, Take 1 tablet (25 mg total) by mouth at bedtime as needed for sleep., Disp: 10 tablet, Rfl: 0     prenatal vit 14-iron fum-folic 29 mg iron- 1 mg Chew, Chew 1 tablet., Disp: , Rfl:      pyridoxine, vitamin B6, (VITAMIN B-6) 25 MG tablet, Take 1 tablet (25 mg total) by mouth daily., Disp: 10 tablet, Rfl: 0     Objective:   Allergies:  Blood-group specific substance and Penicillins    Vitals:  Vitals:    19 1150   BP: 96/62   Patient Site: Right Arm   Patient Position: Sitting   Cuff Size: Adult Large   Pulse: (!) 118   Resp: 16   Weight: (!) 241 lb 4 oz (109.4 kg)     Body mass index is 42.07 kg/m .    Vital signs reviewed.  General: Patient is alert and oriented x 3, in no apparent distress, patient is laying down on exam table when I arrived and she visit, except when asked to sit up for exam  Cardiac: regular rate and rhythm, no murmurs  Pulmonary: lungs clear to auscultation bilaterally, no crackles, rales, rhonchi, or wheezing noted  Heel exam: Fetal heart tones heard at 145-150 bpm at 2 different sites on the abdomen, patient reports positive fetal movement  Extremities: No significant edema noted in ankles or hands    Results for orders placed or performed during the hospital encounter  of 04/16/19   ECG 12 lead   Result Value Ref Range    SYSTOLIC BLOOD PRESSURE  mmHg    DIASTOLIC BLOOD PRESSURE  mmHg    VENTRICULAR RATE 91 BPM    ATRIAL RATE 91 BPM    P-R INTERVAL 156 ms    QRS DURATION 68 ms    Q-T INTERVAL 332 ms    QTC CALCULATION (BEZET) 408 ms    P Axis 48 degrees    R AXIS 66 degrees    T AXIS 20 degrees    MUSE DIAGNOSIS       Normal sinus rhythm  Possible Left atrial enlargement  Borderline ECG  No previous ECGs available  Confirmed by ESTELA CAMPA, WOLF LOC:JN ([id number]) on 4/16/2019 2:42:02 PM     I reviewed EKG and EKG report today    Assessment and Plan:   1.  Follow-up syncope during pregnancy.  Patient is approximately 17 weeks pregnant, pregnant with twins.  Has had 2 episodes of syncope as stated above.  Both episodes were preceded by some dizziness and attempting to steady herself.  No known heart history.  She does have morbid obesity.  She reports no other significant problems during this current pregnancy.  I do not have any OB/GYN notes available to review today.  Patient is concerned that she may return to work and faint there.  Note wrote excusing her for work until the end of this month.  Also referral made to cardiology for further evaluation.  We will notify OB/GYN of results.  Patient is in agreement with plan.  No further questions.    This dictation uses voice recognition software, which may contain typographical errors.

## 2021-05-28 NOTE — PATIENT INSTRUCTIONS - HE
1. Work on keeping yourself well hydrated, with plenty of water.  2. When you feel lightheaded sit down immediately to avoid falls and passing out.  Rest and drink water until you feel better.  3. Consider formal sleep apnea evaluation.  Try to sleep with the head of the bed elevated, or in a recliner.  4. We will check a 24-hour monitor to make sure you are not having heart rhythm problems causing your symptoms.

## 2021-05-28 NOTE — PROGRESS NOTES
CARDIOLOGY [facility] ACCESS CLINIC CONSULT NOTE     Assessment/Plan:   1. Recurrent syncopal episodes, along with lightheadedness.  She is certainly at increased risk for orthostatic hypotension given her pregnancy status.  I suspect there is an element of obstructive sleep apnea with daytime fatigue as well.  We will check a 24-hour Holter monitor to make sure she is not having any arrhythmias that might be contributing.  Encouraged maintenance of adequate hydration, sleeping with the head of bed elevated, and considering formal sleep apnea evaluation  2. Morbid obesity, encouraged regular walking program.    Follow up if significant abnormalities present on Holter monitor     History of Present Illness:     It is my pleasure to see Russell Gomez at the [facility] clinic for evaluation of syncopal episode.    Russell Gomez is a 29 y.o. female with a past medical history of morbid obesity, now 18 weeks pregnant with twins.  Over the last month or so she is began feeling dizzy and lightheaded often during the daytime.  She has begun carrying a electrolyte beverage with her at all times, drinking occasionally.  In the last several weeks she has had 2 syncopal episodes.  One occurred after grocery shopping while standing at the checkout line she felt dizzy lightheaded leaned over and then stood up quickly and passed out.  The other episode occurred during an ultrasound when she had the urge to urinate, stood up and felt lightheaded when falling against the wall and slumping to the floor.  She had no injury with either episode.  She has other episodes of lightheadedness that do not result in syncope.  She is short of breath chronically, gradually worsening during her pregnancy.    She denies history of snoring or sleep apnea but does awaken with bad dreams often during the night.  She is at times sweaty with these episodes.  She does note lightheadedness on essentially a daily basis at this  point.    Past Medical History:     Patient Active Problem List   Diagnosis     Morbid obesity (H)     Intermittent asthma     Herpes Simplex     Abnormal Pap smear of cervix     Adjustment disorder with depressed mood     Low back pain     Depression     Anemia     Fatigue     Acanthosis nigricans     Hirsutism     Vitamin D deficiency     Pregnancy test positive     History of  delivery, currently pregnant in first trimester       Past Surgical History:     Past Surgical History:   Procedure Laterality Date     TONSILLECTOMY         Family History:     Family History   Problem Relation Age of Onset     Hypertension Mother      Asthma Mother      Obesity Sister      Diabetes Paternal Aunt      Obesity Paternal Aunt      Diabetes Paternal Grandfather      Obesity Maternal Aunt      Arthritis Maternal Grandmother      Stroke Paternal Grandmother      Arthritis Paternal Grandmother      Hypertension Son      Family history reviewed and is not pertinent to the chief complaint or presenting problem    Social History:    reports that she has never smoked. She has never used smokeless tobacco. She reports that she does not drink alcohol or use drugs.    Exercise: Walking in the grocery    Sleep: Naps often during the daytime.  Snoring is denied    Meds:     Current Outpatient Medications on File Prior to Visit   Medication Sig Dispense Refill     prenatal vit 14-iron fum-folic 29 mg iron- 1 mg Chew Chew 1 tablet.       progesterone (PROMETRIUM) 200 MG capsule Take 200 mg by mouth 2 (two) times a day.  1     albuterol (PROAIR HFA) 90 mcg/actuation inhaler Inhale 1-2 puffs every 4 (four) hours as needed for wheezing. 1 Inhaler 1     cholecalciferol, vitamin D3, 2,000 unit Chew Chew 1 tablet daily.       doxylamine (UNISOM, DOXYLAMINE,) 25 mg tablet Take 1 tablet (25 mg total) by mouth at bedtime as needed for sleep. 10 tablet 0     pyridoxine, vitamin B6, (VITAMIN B-6) 25 MG tablet Take 1 tablet (25 mg total) by  "mouth daily. 10 tablet 0     No current facility-administered medications on file prior to visit.        Allergies:   Blood-group specific substance and Penicillins    Review of Systems:     General: Weight Gain  Eyes: WNL  Ears/Nose/Throat: WNL  Lungs: WNL  Heart: Chest Pain, Shortness of Breath with activity, Fainting  Stomach: Heartburn  Bladder: WNL  Muscle/Joints: WNL  Skin: WNL  Nervous System: Dizziness  Mental Health: Depression     Blood: WNL        Objective:      Physical Exam  (!) 241 lb (109.3 kg)  5' 3.5\" (1.613 m)  Body mass index is 42.02 kg/m .  /72 (Patient Site: Left Arm, Patient Position: Sitting, Cuff Size: Adult Large)   Pulse (!) 104   Resp 16   Ht 5' 3.5\" (1.613 m)   Wt (!) 241 lb (109.3 kg)   LMP 12/18/2018 (Exact Date)   BMI 42.02 kg/m      General Appearance : Awake, No acute distress.  Tired appearing  HEENT: No Scleral icterus; the mucous membranes were pink and moist.  Conjunctivae slightly injected  Neck:  No cervical bruits, jugular venous distention, or thyromegaly.  Stout  Chest: The spine was straight  Lungs: Respirations unlabored; the lungs are clear to auscultation.  No wheezing  Cardiovascular: Nonpalpable point of maximal impulse.  Auscultation reveals regular, rapid first and second heart sounds with no murmurs, rubs, or gallops.  Carotid, radial, and dorsalis pedal pulses are intact and symmetric.  Abdomen: Obese.  Gravid.  No organomegaly, masses, bruits, or tenderness. Bowels sounds are present  Extremities: No edema  Skin: No xanthelasma. Warm, Dry.  Musculoskeletal: No tenderness.  Neurologic: Alert and oriented ×3.      EKG(personally reviewed):  4/16/2019: Normal sinus rhythm at 91 bpm.  Normal ECG    Cardiac Imaging Studies:  ECG today shows sinus rhythm at 99-minute.  Limb lead reversal.    Lab Review   Lab Results   Component Value Date     02/18/2019    K 5.0 02/18/2019     02/18/2019    CO2 21 (L) 02/18/2019    BUN 7 (L) 02/18/2019    " CREATININE 0.72 02/18/2019    CALCIUM 9.7 02/18/2019     Lab Results   Component Value Date    WBC 8.4 02/18/2019    HGB 12.3 02/18/2019    HCT 38.8 02/18/2019    MCV 71 (L) 02/18/2019     02/18/2019     Lab Results   Component Value Date    CHOL 138 10/28/2016    TRIG 74 10/28/2016    HDL 52 10/28/2016    LDLCALC 71 10/28/2016     No results found for: TROPONINI  No results found for: BNP  Lab Results   Component Value Date    TSH 1.00 10/21/2016       Eren Cotter MD [facility]    His note created using Dragon voice recognition software. Sound alike errors may have escaped editing.

## 2021-05-30 VITALS — HEIGHT: 64 IN | BODY MASS INDEX: 44.39 KG/M2 | WEIGHT: 260 LBS

## 2021-05-30 VITALS — BODY MASS INDEX: 42.85 KG/M2 | WEIGHT: 251 LBS | HEIGHT: 64 IN

## 2021-05-30 VITALS — HEIGHT: 64 IN | BODY MASS INDEX: 42.76 KG/M2 | WEIGHT: 250.5 LBS

## 2021-05-31 VITALS — WEIGHT: 248.5 LBS | HEIGHT: 64 IN | BODY MASS INDEX: 42.43 KG/M2

## 2021-05-31 VITALS — BODY MASS INDEX: 47.13 KG/M2 | WEIGHT: 266 LBS | HEIGHT: 63 IN

## 2021-05-31 VITALS — BODY MASS INDEX: 43.45 KG/M2 | WEIGHT: 254.5 LBS | HEIGHT: 64 IN

## 2021-05-31 VITALS — BODY MASS INDEX: 44.98 KG/M2 | HEIGHT: 64 IN | WEIGHT: 263.5 LBS

## 2021-05-31 VITALS — HEIGHT: 64 IN | BODY MASS INDEX: 45.24 KG/M2 | WEIGHT: 265 LBS

## 2021-06-01 ENCOUNTER — HOSPITAL ENCOUNTER (OUTPATIENT)
Dept: ULTRASOUND IMAGING | Facility: CLINIC | Age: 31
End: 2021-06-01
Attending: OBSTETRICS & GYNECOLOGY
Payer: COMMERCIAL

## 2021-06-01 ENCOUNTER — OFFICE VISIT (OUTPATIENT)
Dept: MATERNAL FETAL MEDICINE | Facility: CLINIC | Age: 31
End: 2021-06-01
Attending: OBSTETRICS & GYNECOLOGY
Payer: COMMERCIAL

## 2021-06-01 VITALS — HEIGHT: 64 IN | WEIGHT: 249.5 LBS | BODY MASS INDEX: 42.59 KG/M2

## 2021-06-01 VITALS — WEIGHT: 243.5 LBS | BODY MASS INDEX: 41.57 KG/M2 | HEIGHT: 64 IN

## 2021-06-01 DIAGNOSIS — O09.892 H/O PRETERM DELIVERY, CURRENTLY PREGNANT, SECOND TRIMESTER: Primary | ICD-10-CM

## 2021-06-01 DIAGNOSIS — O28.3 ABNORMAL PRENATAL ULTRASOUND: Primary | ICD-10-CM

## 2021-06-01 DIAGNOSIS — O28.3 ABNORMAL PRENATAL ULTRASOUND: ICD-10-CM

## 2021-06-01 DIAGNOSIS — O26.90 PREGNANCY RELATED CONDITION, ANTEPARTUM: ICD-10-CM

## 2021-06-01 DIAGNOSIS — O99.840 PREGNANCY COMPLICATED BY PREVIOUS GASTRIC BYPASS, ANTEPARTUM: ICD-10-CM

## 2021-06-01 DIAGNOSIS — O09.899 HISTORY OF PRETERM DELIVERY, CURRENTLY PREGNANT: ICD-10-CM

## 2021-06-01 PROCEDURE — 999N000069 HC STATISTIC GENETIC COUNSELING, < 16 MIN: Performed by: GENETIC COUNSELOR, MS

## 2021-06-01 PROCEDURE — 99213 OFFICE O/P EST LOW 20 MIN: CPT | Mod: 25

## 2021-06-01 PROCEDURE — 36415 COLL VENOUS BLD VENIPUNCTURE: CPT | Performed by: OBSTETRICS & GYNECOLOGY

## 2021-06-01 PROCEDURE — 76811 OB US DETAILED SNGL FETUS: CPT | Mod: 26 | Performed by: OBSTETRICS & GYNECOLOGY

## 2021-06-01 PROCEDURE — 999N001100 HC STATISTIC VERIFI PRENATAL TRISOMY 21,18,13: Performed by: OBSTETRICS & GYNECOLOGY

## 2021-06-01 PROCEDURE — 76811 OB US DETAILED SNGL FETUS: CPT

## 2021-06-01 NOTE — PATIENT INSTRUCTIONS - HE
[facility] Bariatric Basics    Remember to:    -Eat 3 meals a day (not 2, not 5) Chew your food well/SLOW down  -Eat your protein first  -Be a water drinker/Minize liquid calories (no regular pop, no juice) skim or 1% milk OK  -Sleep 7-8 hours each night. Address sleep if problematic  -Stress management is important. Address if problematic  -Move-8000 steps daily Muscle: maintain your muscle mass (strength training 2X/wk)  -Wheat, not white (bread, pasta, crackers, edwina, bagels, tortillas, rice)  -Limit restaurant, cafeteria, take out, drive through to 2 times per week or less  -Minimize caffeine, alcohol, and night-time snacking  -Consider keeping a food diary (i.e. My Fitness Pal, Lose It, or other food tracker)  -Follow up with the dietitian      **Some lean proteins: chicken, turkey, tuna, salmon, crab, fish, shrimp, scallops, lobster, lean cuts of beef and pork, luncheon meats, veggie burgers, beans (black, lima, garbanzo, rachel, kidney, refried), chile, cottage cheese, string cheese, other cheese, eggs, tofu, peanut butter, nuts, vegan crumbles, greek yogurt

## 2021-06-01 NOTE — PROGRESS NOTES
The patient was seen for an ultrasound in the Maternal[facility]Fetal Medicine Center at the [facility] today.  For a detailed report of the ultrasound examination, please see the ultrasound report which can be found under the imaging tab.    Sonia Stout MD  , OB/GYN  Maternal-Fetal Medicine  [phone] (Pager)

## 2021-06-01 NOTE — PROGRESS NOTES
"Bariatric Follow-up    29 y.o.  female BMI:Body mass index is 42.89 kg/m .    Weight:   Wt Readings from Last 1 Encounters:   10/01/19 (!) 246 lb (111.6 kg)    pounds  Height: 5' 3.5\" (1.613 m) (10/1/2019  2:38 PM)  Initial Weight: 266.5 lbs (10/1/2019  2:38 PM)  Weight: (!) 246 lb (111.6 kg) (10/1/2019  2:38 PM)  Weight loss from initial: 20.5 (10/1/2019  2:38 PM)  % Weight loss: 7.69 % (10/1/2019  2:38 PM)  BMI (Calculated): 42.9 (10/1/2019  2:38 PM)  SpO2: 97 % (10/1/2019  2:38 PM)      Comorbidities:  Patient Active Problem List   Diagnosis     Morbid obesity (H)     Intermittent asthma     Herpes Simplex     Abnormal Pap smear of cervix     Adjustment disorder with depressed mood     Low back pain     Depression     Anemia     Fatigue     Acanthosis nigricans     Hirsutism     Vitamin D deficiency     Pregnancy test positive     History of  delivery, currently pregnant in first trimester     Syncope, unspecified syncope type     Bacterial vaginosis     Cervical insufficiency during pregnancy in second trimester, antepartum     Delayed delivery of second twin, triplet, etc.     Dichorionic diamniotic twin pregnancy in second trimester     Diet controlled gestational diabetes mellitus (GDM) in second trimester     HSV-2 infection complicating pregnancy, second trimester     Nausea and vomiting in pregnancy prior to 22 weeks gestation     Rh negative state in antepartum period, second trimester     Pre-syncope       Interim: Was pregnant with fraternal twin boys. Miscarried at 21w7d. Eating a lot of ice. Skipping meals, [place] visits declining. Not sleeping well since losing the boys.  Plan: Dietitian visit. Phentermine. Structured sleep schedule.   -We reviewed her medications and whether associated with weight gain.    We discussed [facility] Bariatric Basics including:  -eating 3 meals daily  -eating protein first  -eating slowly, chewing food well  -avoiding/limiting calorie containing " "beverages  -choosing wheat, not white with breads, crackers, pastas, edwina, bagels, tortillas, rice  -limiting restaurant or cafeteria eating to twice a week or less  -We discussed the importance of restorative sleep and stress management in maintaining a healthy weight.  -We discussed insulin resistance and glycemic index as it relates to appetite and weight control  -We discussed the National Weight Control Registry healthy weight maintenance strategies and ways to optimize metabolism.  -We discussed the importance of physical activity including cardiovascular and strength training in maintaining a healthier weight and explored viable options.    Most recent labs:  Lab Results   Component Value Date    WBC 8.4 02/18/2019    HGB 12.3 02/18/2019    HCT 38.8 02/18/2019    MCV 71 (L) 02/18/2019     02/18/2019     Lab Results   Component Value Date    CHOL 138 10/28/2016     Lab Results   Component Value Date    HDL 52 10/28/2016     Lab Results   Component Value Date    LDLCALC 71 10/28/2016     Lab Results   Component Value Date    TRIG 74 10/28/2016       Lab Results   Component Value Date    ALT 27 02/18/2019    AST 38 02/18/2019    ALKPHOS 61 02/18/2019    BILITOT 0.6 02/18/2019     Lab Results   Component Value Date    HGBA1C 5.2 01/08/2019     Lab Results   Component Value Date    SFMUWKMJ44 775 10/28/2016     Lab Results   Component Value Date    YFMKZCZH81LQ 44.9 01/08/2019     Lab Results   Component Value Date    FERRITIN 18 10/28/2016     Lab Results   Component Value Date    PTH 82 10/28/2016       Lab Results   Component Value Date    TSH 1.00 10/21/2016       DIETARY HISTORY  Meals Per Day: ?  Eating Protein First?: no Mostly I eat ice  Food Diary: B:skips L:skips or nutrasystem shake D:ice, green beans, turkey meatballs with cheese  Snacks Per Day: yes \"a lot\"  Typical Snack: weight loss products from [employer], crackers  Fluid Intake: a lots  Portion Control: on and off  Calorie Containing " "Beverages: caribou here and there-caramel cooler  Alcohol per week: no  Typical Protein Food Choices: variety  Choosing Whole Grains: no  Grocery Shopping is done by: herself  Food Preparation is done by: herself  Meals at Restaurant/Cafeteria/Take Out Per Week: increasing more than usual.  Eating at the Table: no  TV is Off During Meals: no    Positive Changes Since Last Visit: ready to take care of herself  Struggling With: grieving    Knowledgeable in Reading Food Labels: historically  Getting Adequate Protein: ?  Sleeping 7-8 hours/day 6 hours  Stress management doing her best. Seeing a therapist    PHYSICAL ACTIVITY PATTERNS:  Cardiovascular: none  Strength Training: none    REVIEW OF SYSTEMS  GENERAL/CONSTITUTIONAL:  Fatigue: yes  CARDIOVASCULAR:  Chest Pain with Exertion: no  PULMONARY:  Dyspnea on exertion: yes  CPAP Use: no  Tobacco Use: no  Asthma Controlled: na  GASTROINTESTINAL:  GERD/Heartburn: no  Gallbladder:   UROLOGIC:  Urinary Symptoms: no  NEUROLOGIC:  Headaches: no  Paresthesias: no  PSYCHIATRIC:  Moods: grieving  MUSCULOSKELETAL/RHEUMATOLOGIC  Arthralgias: Ok  Myalgias: back pain  ENDOCRINE:  Monitoring Blood Sugars: no  Sugars Well Controlled: yes  DERMATOLOGIC:  Rashes: no    PHYSICAL EXAM:  Vitals: /69   Pulse 85   Resp 16   Ht 5' 3.5\" (1.613 m)   Wt (!) 246 lb (111.6 kg)   LMP 12/18/2018 (Exact Date)   SpO2 97%   Breastfeeding? No   BMI 42.89 kg/m    Height: 5' 3.5\" (1.613 m) (10/1/2019  2:38 PM)  Initial Weight: 266.5 lbs (10/1/2019  2:38 PM)  Weight: (!) 246 lb (111.6 kg) (10/1/2019  2:38 PM)  Weight loss from initial: 20.5 (10/1/2019  2:38 PM)  % Weight loss: 7.69 % (10/1/2019  2:38 PM)  BMI (Calculated): 42.9 (10/1/2019  2:38 PM)  SpO2: 97 % (10/1/2019  2:38 PM)      GEN: Pleasant, well groomed, in no acute distress  EYES: EOMI,  ENT: airway patent  NECK: no carotid bruits, no anterior/supraclavicular lymphadenopathy, thyroid normal   HEART: Rhythm regular, rate regular, no " murmur   LUNGS: Clear  ABDOMEN: soft, non-tender, obese, no rashes   VASCULAR: no  lower extremity edema  MUSCULOSKELETAL:  muscle mass OK  SKIN:  no color changes of venous stasis, no ulcerations    Time spent with patients 30 minutes, >50% in counseling and coordination of care.

## 2021-06-01 NOTE — PROGRESS NOTES
[facility]  Genetic Counseling Consult    Patient:  Russell Gomez YOB: 1990   Date of Service:  6/01/21      Russell Gomez was seen at the [facility] for genetic consultation as part of her appointment for comprehensive ultrasound.  The indication for genetic counseling is marker for aneuploidy on today's ultrasound.       Impression/Plan:   1. Russell has not had serum screening in this pregnancy.     2. Russell had a comprehensive (level II) ultrasound today which noted an echogenic intracardiac focus and hypoplastic nasal bone.  Please see the ultrasound report for further details.    3. The patient had a blood draw for NIPT (Innatal test through [employer]).  Results are expected within 5-7 days, and will be available in [facility].  We will contact her to discuss the results, and a copy will be forwarded to the office of the referring OB provider. Russell will be contacted at the number she provided, [phone], and requests that detailed results be left in her voicemail if she cannot be reached.      4. Russell has met previously with genetic counseling in this pregnancy, please see corresponding documentation for complete details.          Risk Assessment for Chromosome Conditions:   We explained that the risk for fetal chromosome abnormalities increases with maternal age. We discussed specific features of common chromosome abnormalities, including Down syndrome, trisomy 13, trisomy 18, and sex chromosome trisomies.      - At age 31 at midtrimester, the risk to have a baby with Down syndrome is 1 in 597.    - At age 31 at midtrimester, the risk to have a baby with any chromosome abnormality is 1 in 299.       Russell did not have maternal serum screening earlier in pregnancy.    During today's encounter we reviewed the findings from today's ultrasound, hypoplastic nasal bone and echogenic intracardiac focus  (EIF).  Hypoplastic nasal bone is a marker for increased risk for Down syndrome, with a relative risk adjustment of 20.1.  EIF is considered a common finding in during pregnancy, occurring in as many as 1% of pregnancies.  Neither of these findings represent birth defects and is not expected to cause any complications or require any specific management after delivery.  We discussed that when it is identified, it may increase the risk for a pregnancy to be affected with Down syndrome.  The relative risk increase for an EIF is as high as 1.8.  Jazzs age related risk for her pregnancy to be affected with Down syndrome is 1/597.  The adjusted risk for [place] pregnancy to be affected with Down syndrome is 1/16, or ~6%. This corresponds to a ~94% chance that [place] pregnancy does not have Down syndrome.  We discussed that this finding does represent a change in risk, and that additional testing for clarification would be appropriate if desired.           Testing Options:   We discussed the following options:   Non-invasive Prenatal Testing (NIPT)    Maternal plasma cell-free DNA testing; first trimester ultrasound with nuchal translucency and nasal bone assessment is recommended, when appropriate    Screens for fetal trisomy 21, trisomy 13, trisomy 18, and sex chromosome aneuploidy    Cannot screen for open neural tube defects; maternal serum AFP after 15 weeks is recommended       Genetic Amniocentesis    Invasive procedure typically performed in the second trimester by which amniotic fluid is obtained for the purpose of chromosome analysis and/or other prenatal genetic analysis    Diagnostic results; >99% sensitivity for fetal chromosome abnormalities    AFAFP measurement tests for open neural tube defects       Comprehensive (Level II) ultrasound: Detailed ultrasound performed between 18-22 weeks gestation to screen for major birth defects and markers for aneuploidy.        We reviewed the benefits and  limitations of this testing.  Screening tests provide a risk assessment specific to the pregnancy for certain fetal chromosome abnormalities, but cannot definitively diagnose or exclude a fetal chromosome abnormality.  Follow-up genetic counseling and consideration of diagnostic testing is recommended with any abnormal screening result.     Diagnostic tests carry inherent risks- including risk of miscarriage- that require careful consideration.  These tests can detect fetal chromosome abnormalities with greater than 99% certainty.  Results can be compromised by maternal cell contamination or mosaicism, and are limited by the resolution of cytogenetic G-banding technology.  There is no screening nor diagnostic test that can detect all forms of birth defects or mental disability.    It was a pleasure to be involved with [facility]. Face-to-face time of the meeting was 15 minutes.    Kyle Helton MS, [facility]  Licensed Genetic Counselor  Phone: [phone]  Pager: [phone]

## 2021-06-02 VITALS — BODY MASS INDEX: 42.68 KG/M2 | HEIGHT: 64 IN | WEIGHT: 250 LBS

## 2021-06-02 VITALS — WEIGHT: 238 LBS | HEIGHT: 64 IN | BODY MASS INDEX: 40.63 KG/M2

## 2021-06-02 VITALS — BODY MASS INDEX: 41.69 KG/M2 | WEIGHT: 239.08 LBS

## 2021-06-02 NOTE — PROGRESS NOTES
Medical  Weight Loss Follow-Up Diet Evaluation  Assessment:  Russell is presenting today for a follow up weight management nutrition consultation. Pt has had an initial appointment with Dr. Pierre.   Weight loss medication: Phentermine.   Pt's Initial Weight: 266.5 lbs  Weight: (!) 247 lb (112 kg)  Weight loss from initial: 19.5  % Weight loss: 7.32 %    BMI: Body mass index is 43.07 kg/m .  IBW: 120 lbs    Estimated RMR (Cresson-St Jeor equation): 1851 kcals   Recommended Protein Intake: 60-80 grams of protein/day  Patient Active Problem List:     Patient Active Problem List   Diagnosis     Morbid obesity (H)     Intermittent asthma     Herpes Simplex     Abnormal Pap smear of cervix     Adjustment disorder with depressed mood     Low back pain     Depression     Anemia     Fatigue     Acanthosis nigricans     Hirsutism     Vitamin D deficiency     Pregnancy test positive     History of  delivery, currently pregnant in first trimester     Syncope, unspecified syncope type     Bacterial vaginosis     Cervical insufficiency during pregnancy in second trimester, antepartum     Delayed delivery of second twin, triplet, etc.     Dichorionic diamniotic twin pregnancy in second trimester     Diet controlled gestational diabetes mellitus (GDM) in second trimester     HSV-2 infection complicating pregnancy, second trimester     Nausea and vomiting in pregnancy prior to 22 weeks gestation     Rh negative state in antepartum period, second trimester     Pre-syncope       Progress on goals from last visit: Pt discouraged with weight gain. Pt reports weight gain associated with depression. Pt reports having periods of eating large amounts of food. History of binge eating and reports currently getting worse r/t depression. Depression r/t losing twins from pregnancy.     Dietary Recall:  Breakfast: special K cereal with 2% milk (10g)  Snack: strawberry yogurt (10g)  Lunch: [employer] protein shake, orange (20g)   Snack: none    Dinner: Chicken sandwich, spicy nuggets, fries from [employer] (30g)  Snack: cookies  Overnight eating: No  Eating out (frequency/week): nightly for dinner   Hydration (type/oz. per day):  Water: 64-80 oz   Caffeine:1 energy drink  (pt reports lacking sleep)  Carbonation: diet coke at fast food restaurant   Exercise:  Routine exercise established: No     Nutrition Diagnosis:    Overweight/Obesity (NC 3.3) related to overeating and poor lifestyle habits as evidenced by patient's subjective statements and BMI 43.  Intervention:  1. Food and/or nutrient delivery: Discussed benefits of reducing eating out.  2. Nutrition counseling: Encouraged pt using motivational interviewing techniques  3. Discussed benefits of keeping a food journal.   4. Coordination of nutrition care: f/u with bariatrician to discussed adding medication to assist with binge eating.     Monitoring/Evaluation:    Goals:  1. Keep food journal    Follow up:  Pt will follow up in 1 month(s) with bariatrician and 1 month(s) with dietitian.     Time spent with patient: 30 minutes  Ashley Barrett RD     ABN signed: Yes

## 2021-06-03 VITALS
SYSTOLIC BLOOD PRESSURE: 121 MMHG | HEIGHT: 64 IN | RESPIRATION RATE: 16 BRPM | BODY MASS INDEX: 42 KG/M2 | DIASTOLIC BLOOD PRESSURE: 69 MMHG | OXYGEN SATURATION: 97 % | WEIGHT: 246 LBS | HEART RATE: 85 BPM

## 2021-06-03 VITALS — WEIGHT: 247 LBS | HEIGHT: 64 IN | BODY MASS INDEX: 42.17 KG/M2

## 2021-06-03 VITALS — WEIGHT: 241 LBS | BODY MASS INDEX: 41.15 KG/M2 | HEIGHT: 64 IN

## 2021-06-03 VITALS — BODY MASS INDEX: 42.07 KG/M2 | WEIGHT: 241.25 LBS

## 2021-06-04 ENCOUNTER — TELEPHONE (OUTPATIENT)
Dept: MATERNAL FETAL MEDICINE | Facility: CLINIC | Age: 31
End: 2021-06-04

## 2021-06-04 VITALS
HEIGHT: 64 IN | OXYGEN SATURATION: 98 % | BODY MASS INDEX: 43.36 KG/M2 | DIASTOLIC BLOOD PRESSURE: 64 MMHG | HEART RATE: 111 BPM | RESPIRATION RATE: 16 BRPM | SYSTOLIC BLOOD PRESSURE: 108 MMHG | WEIGHT: 254 LBS

## 2021-06-04 VITALS — HEIGHT: 64 IN | BODY MASS INDEX: 43.77 KG/M2 | WEIGHT: 256.4 LBS

## 2021-06-04 VITALS
HEART RATE: 80 BPM | DIASTOLIC BLOOD PRESSURE: 82 MMHG | SYSTOLIC BLOOD PRESSURE: 110 MMHG | BODY MASS INDEX: 43.77 KG/M2 | WEIGHT: 251 LBS

## 2021-06-04 VITALS — BODY MASS INDEX: 44.73 KG/M2 | HEIGHT: 64 IN | WEIGHT: 262 LBS

## 2021-06-04 VITALS — WEIGHT: 267 LBS | BODY MASS INDEX: 45.58 KG/M2 | HEIGHT: 64 IN

## 2021-06-04 VITALS — WEIGHT: 250 LBS | HEIGHT: 63 IN | BODY MASS INDEX: 44.3 KG/M2

## 2021-06-04 VITALS — WEIGHT: 260 LBS | BODY MASS INDEX: 44.39 KG/M2 | HEIGHT: 64 IN

## 2021-06-04 VITALS
BODY MASS INDEX: 44.51 KG/M2 | SYSTOLIC BLOOD PRESSURE: 129 MMHG | HEIGHT: 64 IN | WEIGHT: 260.75 LBS | DIASTOLIC BLOOD PRESSURE: 82 MMHG | HEART RATE: 85 BPM

## 2021-06-04 VITALS — BODY MASS INDEX: 42.7 KG/M2 | HEIGHT: 63 IN | WEIGHT: 241 LBS

## 2021-06-04 NOTE — TELEPHONE ENCOUNTER
6/4/2021       Called Russell to discuss NIPT results.  Results came back negative for chromosome abnormalities in chromosomes 21, 18, & 13, as well as the sex chromosomes.  These test results do not definitively rule out the possibility of one of these conditions, but they do greatly reduce the likelihood.  The test identified sex chromosomes consistent with female sex (XX) which is consistent with ultrasound.  Russell had no questions at this time and was encouraged to reach out if she has any questions or concerns in the future.       Kyle Helton MS, [facility]  Licensed Genetic Counselor  Phone: [phone]  Pager: [phone]

## 2021-06-04 NOTE — PROGRESS NOTES
"Bariatric Follow-up    29 y.o.  female BMI:Body mass index is 44.29 kg/m .    Weight:   Wt Readings from Last 1 Encounters:   19 (!) 254 lb (115.2 kg)    pounds  Height: 5' 3.5\" (1.613 m) (2019  2:13 PM)  Initial Weight: 266.5 lbs (2019  2:13 PM)  Weight: (!) 254 lb (115.2 kg) (2019  2:13 PM)  Weight loss from initial: 12.5 (2019  2:13 PM)  % Weight loss: 4.69 % (2019  2:13 PM)  BMI (Calculated): 44.3 (2019  2:13 PM)  SpO2: 98 % (2019  2:13 PM)      Comorbidities:  Patient Active Problem List   Diagnosis     Morbid obesity (H)     Intermittent asthma     Herpes Simplex     Abnormal Pap smear of cervix     Adjustment disorder with depressed mood     Low back pain     Depression     Anemia     Fatigue     Acanthosis nigricans     Hirsutism     Vitamin D deficiency     Pregnancy test positive     History of  delivery, currently pregnant in first trimester     Syncope, unspecified syncope type     Bacterial vaginosis     Cervical insufficiency during pregnancy in second trimester, antepartum     Delayed delivery of second twin, triplet, etc.     Dichorionic diamniotic twin pregnancy in second trimester     Diet controlled gestational diabetes mellitus (GDM) in second trimester     HSV-2 infection complicating pregnancy, second trimester     Nausea and vomiting in pregnancy prior to 22 weeks gestation     Rh negative state in antepartum period, second trimester     Pre-syncope       Interim: More binge eating. Grieving the loss of her twin boys that she miscarried. Seeing a therapist. Taking PNV and needs RF. Phentermine works. She was down to 218 recently on phentermine. Currently out.    Plan: Refill PNV. Consider vyvanse for binge eating. Phentermine has proven most helpful. Refill that. Encouraged making a list of things to do instead of eating. Work with therapist regarding stress and anxiety  -We reviewed her medications and whether associated with weight " gain.    We discussed [facility] Bariatric Basics including:  -eating 3 meals daily  -eating protein first  -eating slowly, chewing food well  -avoiding/limiting calorie containing beverages  -choosing wheat, not white with breads, crackers, pastas, edwina, bagels, tortillas, rice  -limiting restaurant or cafeteria eating to twice a week or less  -We discussed the importance of restorative sleep and stress management in maintaining a healthy weight.  -We discussed insulin resistance and glycemic index as it relates to appetite and weight control  -We discussed the National Weight Control Registry healthy weight maintenance strategies and ways to optimize metabolism.  -We discussed the importance of physical activity including cardiovascular and strength training in maintaining a healthier weight and explored viable options.    Most recent labs:  Lab Results   Component Value Date    WBC 8.4 02/18/2019    HGB 12.3 02/18/2019    HCT 38.8 02/18/2019    MCV 71 (L) 02/18/2019     02/18/2019     Lab Results   Component Value Date    CHOL 138 10/28/2016     Lab Results   Component Value Date    HDL 52 10/28/2016     Lab Results   Component Value Date    LDLCALC 71 10/28/2016     Lab Results   Component Value Date    TRIG 74 10/28/2016       Lab Results   Component Value Date    ALT 27 02/18/2019    AST 38 02/18/2019    ALKPHOS 61 02/18/2019    BILITOT 0.6 02/18/2019     Lab Results   Component Value Date    HGBA1C 5.2 01/08/2019     Lab Results   Component Value Date    QFCZEIXU76 775 10/28/2016     Lab Results   Component Value Date    ZHVTQXVE94KS 44.9 01/08/2019     Lab Results   Component Value Date    FERRITIN 18 10/28/2016     Lab Results   Component Value Date    PTH 82 10/28/2016       Lab Results   Component Value Date    TSH 1.00 10/21/2016     DIETARY HISTORY  Meals Per Day: grazing all day  Eating Protein First?: no  Food Diary: B:poptarts L:subway D:salad  Snacks Per Day: all day  Typical Snack: chocolate  "muffins, 3 cookies, chips  Fluid Intake: intentional  Portion Control: problematic  Calorie Containing Beverages: none  Alcohol per week: no  Typical Protein Food Choices: variety  Choosing Whole Grains: yes  Grocery Shopping is done by: she does  Food Preparation is done by: she does  Meals at Restaurant/Cafeteria/Take Out Per Week: cutting down recently to one  Eating at the Table: no  TV is Off During Meals: no    Positive Changes Since Last Visit:   Struggling With: stress    Knowledgeable in Reading Food Labels: yes  Getting Adequate Protein: yes  Sleeping 7-8 hours/day yes  Stress management OK    PHYSICAL ACTIVITY PATTERNS:  Cardiovascular: walking-going to go to the gym   Strength Training: no    REVIEW OF SYSTEMS  GENERAL/CONSTITUTIONAL:  Fatigue: yes  CARDIOVASCULAR:  Chest Pain with Exertion: no  PULMONARY:  Dyspnea on exertion: yes  CPAP Use: no  Tobacco Use: no  Asthma Controlled: yes  GASTROINTESTINAL:  GERD/Heartburn: no  Gallbladder:   UROLOGIC:  Urinary Symptoms: no  NEUROLOGIC:  Headaches: no  Paresthesias: no  PSYCHIATRIC:  Moods: grieving  MUSCULOSKELETAL/RHEUMATOLOGIC  Arthralgias: LBP  Myalgias: LBP  ENDOCRINE:  Monitoring Blood Sugars: no  Sugars Well Controlled: yes  DERMATOLOGIC:  Rashes: no    PHYSICAL EXAM:  Vitals: /64   Pulse (!) 111   Resp 16   Ht 5' 3.5\" (1.613 m)   Wt (!) 254 lb (115.2 kg)   SpO2 98%   Breastfeeding No   BMI 44.29 kg/m    Height: 5' 3.5\" (1.613 m) (12/17/2019  2:13 PM)  Initial Weight: 266.5 lbs (12/17/2019  2:13 PM)  Weight: (!) 254 lb (115.2 kg) (12/17/2019  2:13 PM)  Weight loss from initial: 12.5 (12/17/2019  2:13 PM)  % Weight loss: 4.69 % (12/17/2019  2:13 PM)  BMI (Calculated): 44.3 (12/17/2019  2:13 PM)  SpO2: 98 % (12/17/2019  2:13 PM)      GEN: Pleasant, well groomed, in no acute distress  EYES: EOMI,  ENT: airway patent  NECK: no carotid bruits, no anterior/supraclavicular lymphadenopathy, thyroid normal   HEART: Rhythm regular, rate regular, " no murmur   LUNGS: Clear  ABDOMEN: soft, non-tender, obese, no rashes   VASCULAR: no  lower extremity edema  MUSCULOSKELETAL:  muscle mass OK  SKIN:  no color changes of venous stasis, no ulcerations    Time spent with patients 25 minutes, >50% in counseling and coordination of care.

## 2021-06-04 NOTE — PROGRESS NOTES
Cc  Wishes information  Why didn't they do something to prevent my babies from coming too soon and dying?    On prenatals  On phentermine  Last inhaler used long time.    Hx bariatric clinic on phentermine.  Has not done any surgery    Hx bronchodilator use and rare use    Has 9 yo with adhd issues who was born at 31 wks    In may this year, likely but not diagnosed gestational diabetes.  Presented with ruptured membranes and eventual delivery at 21 wks of twin boys and eventual demise.  Was told cervix not holding, was told could do something to hold close but nothing was done and babies lost.    Wishes to get answers and what to do to have healthy child.         OBJECTIVE:   Vitals:    12/19/19 0810   BP: 110/82   Pulse: 80      Wt is noted.  No diaphoresis  Eyes: nl eom, anicteric   External ears, nose: nl    Neck: nl nodes, supple, thyroid normal   Lungs: clear to ausc   Heart: regular rhythm  Abd: soft nontender     No cva (renal) tenderness  Neuro: no weakness  Skin no rash  Joints: uninflamed   No ketotic breath odor noted  Mental: euthymic  Ext: nontender calves   Gait: normal    Body mass index is 43.77 kg/m .    ASSESSMENT/PLAN:    1. CI (cervical incompetence)  Ambulatory referral to Obstetrics / Gynecology   2. Cervical insufficiency during pregnancy in second trimester, antepartum     3. Delayed delivery of second twin, triplet, etc.     4. Diet controlled gestational diabetes mellitus (GDM) in second trimester     5. Morbid obesity (H)     6. Mild intermittent asthma without complication       Problem number of new, unstable, or uncontrolled problems above (others are stable):1    Will assist in connecting with obgyn for assistance.    Chronic issues stable/ same treatment  Current Outpatient Medications on File Prior to Visit   Medication Sig Dispense Refill     albuterol (PROAIR HFA) 90 mcg/actuation inhaler Inhale 1-2 puffs every 4 (four) hours as needed for wheezing. 1 Inhaler 1     naltrexone  (DEPADE) 50 mg tablet Take 0.5 tablets (25 mg total) by mouth 2 (two) times a day. Take 1/2 tablet with the evening meal for one week then take 1/2 tablet with breakfast and with the evening meal 190 tablet prn     phentermine (ADIPEX-P) 37.5 mg tablet Take 1/2 to 1 tablet in the morning. 90 tablet 1     PNV cmb#95-ferrous fumarate-FA (PRENATAL VITAMIN WITH MINERALS) 28 mg iron- 800 mcg Tab Take 1 tablet by mouth daily. 90 tablet prn     cholecalciferol, vitamin D3, 2,000 unit Chew Chew 1 tablet daily.       No current facility-administered medications on file prior to visit.

## 2021-06-05 VITALS
TEMPERATURE: 98.7 F | HEIGHT: 64 IN | WEIGHT: 199 LBS | BODY MASS INDEX: 33.97 KG/M2 | HEART RATE: 68 BPM | SYSTOLIC BLOOD PRESSURE: 108 MMHG | RESPIRATION RATE: 18 BRPM | DIASTOLIC BLOOD PRESSURE: 68 MMHG | OXYGEN SATURATION: 97 %

## 2021-06-05 VITALS
HEIGHT: 65 IN | DIASTOLIC BLOOD PRESSURE: 74 MMHG | WEIGHT: 248 LBS | SYSTOLIC BLOOD PRESSURE: 125 MMHG | HEART RATE: 81 BPM | BODY MASS INDEX: 41.32 KG/M2

## 2021-06-05 VITALS — HEIGHT: 64 IN | WEIGHT: 193 LBS | BODY MASS INDEX: 32.95 KG/M2

## 2021-06-05 VITALS — HEIGHT: 64 IN | WEIGHT: 178 LBS | BODY MASS INDEX: 30.39 KG/M2

## 2021-06-05 VITALS — WEIGHT: 238.56 LBS | HEIGHT: 64 IN | BODY MASS INDEX: 40.73 KG/M2

## 2021-06-05 VITALS — WEIGHT: 245 LBS | BODY MASS INDEX: 43.41 KG/M2 | HEIGHT: 63 IN

## 2021-06-05 VITALS — BODY MASS INDEX: 38.45 KG/M2 | WEIGHT: 224 LBS

## 2021-06-06 LAB — LAB SCANNED RESULT: NORMAL

## 2021-06-07 NOTE — TELEPHONE ENCOUNTER
Called pt and got her scheduled for a lab appointment tomorrow at [facility] to do her initial labs.    Arti Brown RN, CBN  [facility]  P [phone]  F [phone]

## 2021-06-07 NOTE — PROGRESS NOTES
"Russlel Gomez is a 30 y.o. female who is being evaluated via a billable video visit.      The patient has been notified of following:     \"This video visit will be conducted via a call between you and your physician/provider. We have found that certain health care needs can be provided without the need for an in-person physical exam.  This service lets us provide the care you need with a video conversation.  If a prescription is necessary we can send it directly to your pharmacy.  If lab work is needed we can place an order for that and you can then stop by our lab to have the test done at a later time.    Video visits are billed at different rates depending on your insurance coverage. Please reach out to your insurance provider with any questions.    If during the course of the call the physician/provider feels a video visit is not appropriate, you will not be charged for this service.\"    Patient has given verbal consent to a Video visit? Yes    Patient would like to receive their AVS by AVS Preference: Kushal.    Patient would like the video invitation sent by: Send to e-mail at: [email or web address]    Will anyone else be joining your video visit? No      Video Start Time: 2:30 PM    Additional provider notes:   Outpatient Bariatric Medicine Progress Note-Structured Weight Loss   Indication: Medical bariatric therapy to precede bariatric surgery    Surgery:  Pietro en Y Gastric Bypass    Surgeon: Dr. Richey    Impression     30 y.o. female with Body mass index is 45.33 kg/m . in the setting of morbid obesity which satisfies the NIH criteria for bariatric surgery.    Comorbidities:  Patient Active Problem List   Diagnosis     Morbid obesity (H)     Intermittent asthma     Herpes Simplex     Abnormal Pap smear of cervix     Adjustment disorder with depressed mood     Low back pain     Depression     Anemia     Fatigue     Acanthosis nigricans     Hirsutism     Vitamin D deficiency     Pregnancy test positive     " "History of  delivery, currently pregnant in first trimester     Syncope, unspecified syncope type     Bacterial vaginosis     Cervical insufficiency during pregnancy in second trimester, antepartum     Delayed delivery of second twin, triplet, etc.     Dichorionic diamniotic twin pregnancy in second trimester     Diet controlled gestational diabetes mellitus (GDM) in second trimester     HSV-2 infection complicating pregnancy, second trimester     Nausea and vomiting in pregnancy prior to 22 weeks gestation     Rh negative state in antepartum period, second trimester     Pre-syncope       Plan   Weight will need to be 260# prior to the 2 week pre-operative diet.  Heparin Protocol: standard  CPAP: NA  Actigall: for 6 months post op  Exercise Plan: has DVDs which she has been doing \"flat tummy\"  Contraception Plan: abstinence  Agrees to take vitamins for Life: yes  Agrees to follow up for life: yes  Stress Management Plan: spending time with her strength  Medication Management: would stop phentermine 2 weeks before surgery if she is taking it at that time    Past Surgical History        Past Surgical History:   Procedure Laterality Date     TONSILLECTOMY         Family History, Social History     Family History   Problem Relation Age of Onset     Hypertension Mother      Asthma Mother      Obesity Sister      Diabetes Paternal Aunt      Obesity Paternal Aunt      Diabetes Paternal Grandfather      Obesity Maternal Aunt      Arthritis Maternal Grandmother      Stroke Paternal Grandmother      Arthritis Paternal Grandmother      Hypertension Son         Social History     Socioeconomic History     Marital status: Single     Spouse name: Not on file     Number of children: Not on file     Years of education: Not on file     Highest education level: Not on file   Occupational History     Not on file   Social Needs     Financial resource strain: Not on file     Food insecurity     Worry: Not on file     Inability: " Not on file     Transportation needs     Medical: Not on file     Non-medical: Not on file   Tobacco Use     Smoking status: Never Smoker     Smokeless tobacco: Never Used   Substance and Sexual Activity     Alcohol use: No     Drug use: No     Sexual activity: Yes     Partners: Male     Birth control/protection: Condom   Lifestyle     Physical activity     Days per week: Not on file     Minutes per session: Not on file     Stress: Not on file   Relationships     Social connections     Talks on phone: Not on file     Gets together: Not on file     Attends Zoroastrian service: Not on file     Active member of club or organization: Not on file     Attends meetings of clubs or organizations: Not on file     Relationship status: Not on file     Intimate partner violence     Fear of current or ex partner: Not on file     Emotionally abused: Not on file     Physically abused: Not on file     Forced sexual activity: Not on file   Other Topics Concern     Not on file   Social History Narrative    Single, one son, Son's father is in [place] and not involved    Close with her father who lives in [place]    Not close with her mother who lives here     Miscarried twin boys        Interim History/Pre-op needs list    Initial Labs Complete and Reviewed: ordered today  Dietitian Visits Initiated/cleared: ordered today  Weight Change since initial weight: restart today at 260# 6# less than intro weight  Psychologist visits initiated/cleared: ordered today  HCM UTD: Yes, Pap due 7/2020  Sugars Well Controlled: yes. A1c 5.2  Cardiac: no issues  Pulmonary: asthma with URIs  Hormone use: NA       Medications and Allergies      Current Outpatient Medications   Medication Sig Dispense Refill     albuterol (PROAIR HFA) 90 mcg/actuation inhaler Inhale 1-2 puffs every 4 (four) hours as needed for wheezing. 1 Inhaler 1     PNV cmb#95-ferrous fumarate-FA (PRENATAL VITAMIN WITH MINERALS) 28 mg iron- 800 mcg Tab Take 1 tablet by mouth daily. 90  "tablet prn     No current facility-administered medications for this visit.      Allergies: Blood-group specific substance and Penicillins    Habits   Tobacco Use: never  Alcohol Use: none  NSAIDS: no  Caffeine: occ  SLEEP: 7-8 hours, sometimes naps  CPAP: NA  PHYSICAL ACTIVITY PATTERNS:  Cardiovascular: videos  Strength Training: videos  STRESS MANAGEMENT PATTERNS:   Children, likes to exercise    Dietary History   Meals Per Day: 3  Eating Protein First: sometimes  Grams of Protein daily: unsure  Typical Snack: FIT package from [employer] Intake: intentional  Drinking Separate from Meals: will work on it  Calorie Containing Beverages: OJ on occasion  Soda on occasion  Portion Control: problematic but improved  Chewing Food to Applesauce Consistency: will work on it  Able to list protein foods: chicken, turkey, eggs, yogurt, cheese  Choosing Whole Grains: yes  Meals at Restaurant/Cafeteria/Take Out Per Week: 0-1  Eating at the Table: yes  TV is Off During Meals: yes    Positive Changes Since Last Visit: 6# down, has tried several diet programs and settled on surgery  Struggling With: weight loss despite multiple attempts    Knowledgeable in Reading Food Labels: no    Review of Systems     GENERAL/CONSTITUTIONAL:  Fatigue: yes  HEENT:  Dental Pain: no  Trouble Swallowing: no  CARDIOVASCULAR:  Chest pain with exertion: no  PULMONARY:  CPAP Use: no  Asthma Controlled: yes  GASTROINTESTINAL:  GERD/Heartburn: no-only when pregnant  Gallbladder: present  UROLOGIC:  Urinary Symptoms: no  NEUROLOGIC:  Headaches: no  Paresthesias: no  PSYCHIATRIC:  Moods: OK  MUSCULOSKELETAL/RHEUMATOLOGIC  Arthralgias: yes  Myalgias: yes  ENDOCRINE:  Monitoring Blood Sugars: no  Sugars Well Controlled: yes  DERMATOLOGIC:  Rashes: no  Physical Exam   Vitals: Ht 5' 3.5\" (1.613 m)   Wt (!) 260 lb (117.9 kg) Comment: pt reported  Breastfeeding No   BMI 45.33 kg/m   (most recent documented vitals)  Body mass index is 45.33 kg/m .  Neck " "Circumference: 16\"  Waist Circumference: 52\"  GENERAL: appears her stated age.   Ambulatory.]   Color OK  Central Obesity  NEUROPSYCH: Pleasant, appropriate and in no distress    Counseling   We discussed the National Weight Control Registry and Healthy Weight Maintenance Strategies.   We discussed ways to optimize her metabolism.  We discussed specific exercise goals and dietary habits conducive to a healthy weight.  We discussed the importance of lifelong vitamin supplementation and the potential medical complications of vitamin non-compliance.  We discussed the importance of restorative sleep and stress management in maintaining a healthy weight.  I reminded her to avoid alcohol for 1 year post-operatively, to avoid NSAIDS for life unless specifically indicated and used with a concomitant PPI, to avoid caffeine in excess, and explained the importance of lifelong tobacco abstinence.      Video-Visit Details    Type of service:  Video Visit    Video End Time (time video stopped): 3:25 PM  Originating Location (pt. Location): Home    Distant Location (provider location):  [facility] GENER[facility] SURGERY [facility]     Mode of Communication:  Video Conference via Dimitri Pierre MD    "

## 2021-06-08 NOTE — PROGRESS NOTES
"Bariatric Follow-up    26 y.o.  female BMI:Body mass index is 43.08 kg/(m^2).    Weight:   Wt Readings from Last 1 Encounters:   01/06/17 (!) 251 lb (113.9 kg)    pounds  Height: 5' 4\" (1.626 m) (1/6/2017 10:36 AM)  Initial Weight: 266.5 lbs (1/6/2017 10:36 AM)  Weight: 251 lb (113.9 kg) (1/6/2017 10:36 AM)  Weight loss from initial: 15.5 (1/6/2017 10:36 AM)  % Weight loss: 5.82 % (1/6/2017 10:36 AM)  BMI (Calculated): 43.1 (1/6/2017 10:36 AM)  SpO2: 100 % (1/6/2017 10:36 AM)  Waist Circumference (In): 47 Inches (10/28/2016 12:39 PM)  Hip Circumference (In): 52 Inches (10/28/2016 12:39 PM)  Neck Circumference (In): 16 Inches (10/28/2016 12:39 PM)  NSAIDS: Yes (10/28/2016 12:39 PM)  Pain Scale: 0 (10/28/2016 12:39 PM)    Comorbidities:  Patient Active Problem List   Diagnosis     Morbid obesity     Intermittent Asthma     Herpes Simplex     Abnormal Pap smear of cervix     Adjustment disorder with depressed mood     Low back pain     Heartburn     Morbid obesity with BMI of 45.0-49.9, adult     Depression     Asthma     Menorrhagia     Anemia     Fatigue     Acanthosis nigricans     Hirsutism     Accessory skin tags     Vitamin D deficiency     Interim: Russell has lost 15# since her initial visit October 28. She has seen the dietitian once. She is following the 10/10 rule but not for dinner sometimes.  She is worried that she is falling back with her old habits, she was doing well then went back to eating fast foods even though it makes her feel bad and she doesn't even like fast foods.  Does well B and L but all rules out the door at dinner. She was depressed when she thought her clothing size would go down but the smaller size didn't fit.  She has ended junk food, juice, fast food but then went back to fast food. Drinking more water 9 vitamin water    Plan: Make a list of 20 things to do instead of eating.  Consider 1/2 phentermine twice a day.  Dietitian visit will help with the meal planning so you are " prepared  Discussed exercise options. Moving currently but will start her bike and calithenics    We discussed [facility] Bariatric Basics including:  -eating 3 meals daily  -eating protein first  -eating slowly, chewing food well  -avoiding/limiting calorie containing beverages  -choosing wheat, not white with breads, crackers, pastas, edwina, bagels, tortillas, rice  -limiting restaurant or cafeteria eating to twice a week or less  -We discussed the importance of restorative sleep and stress management in maintaining a healthy weight.  -We discussed insulin resistance and glycemic index as it relates to appetite and weight control  -We discussed the National Weight Control Registry healthy weight maintenance strategies and ways to optimize metabolism.  -We discussed the importance of physical activity including cardiovascular and strength training in maintaining a healthier weight and explored viable options.    Most recent labs:  Lab Results   Component Value Date    WBC 9.6 10/28/2016    HGB 11.8 (L) 10/28/2016    HCT 37.9 10/28/2016    MCV 72 (L) 10/28/2016     10/28/2016     Lab Results   Component Value Date    CHOL 138 10/28/2016     Lab Results   Component Value Date    HDL 52 10/28/2016     Lab Results   Component Value Date    LDLCALC 71 10/28/2016     Lab Results   Component Value Date    TRIG 74 10/28/2016     Lab Results   Component Value Date    ALT 26 (L) 12/29/2011    AST 15 12/29/2011    ALKPHOS 99 12/29/2011    BILITOT 0.37 12/29/2011     Lab Results   Component Value Date    HGBA1C 5.8 10/28/2016     Lab Results   Component Value Date    LKZFBMWW95 775 10/28/2016     Lab Results   Component Value Date    RTNDVUNX64FE 18.2 (L) 10/28/2016     Lab Results   Component Value Date    FERRITIN 18 10/28/2016     Lab Results   Component Value Date    PTH 82 10/28/2016     No results found for: 69606  No results found for: 7597  Lab Results   Component Value Date    TSH 1.00 10/21/2016       DIETARY  HISTORY  Meals Per Day: 3 usually but skips sometimes  Eating Protein First?: yes  Food Diary: B:egg, WW English muffin, swiss cheese orange  L:WW bread, turkey lite ghosh, 10 carrots with light ranch, apple and vitamin water D:lasagne and hawaiian roll Snack Nutty bar 3 or 4  Snacks Per Day: nutty bars  Typical Snack: nutty bars  Fluid Intake: intentional vitamin water  Portion Control: improved  Calorie Containing Beverages: none  Alcohol per week: none  Typical Protein Food Choices: eggs, cheese, chicken, turkey, chile beans  Choosing Whole Grains: yes,   Grocery Shopping is done by: she does  Food Preparation is done by: herself  Meals at Restaurant/Cafeteria/Take Out Per Week: less, none this week  Eating at the Table: yes  TV is Off During Meals: yes    Positive Changes Since Last Visit: 15# weight loss and MANY changes  Struggling With: lapses at night, eating at fast foods even though she doesn't enjoy it    Knowledgeable in Reading Food Labels: yes  Getting Adequate Protein: yes  Sleeping 7-8 hours/day 6-7 hours, not enough  Stress management doing OK. 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  Strength Training: not yet- Has gone to [employer], has done [employer] videos, has an exercise bike, has gone for an hour    REVIEW OF SYSTEMS  GENERAL/CONSTITUTIONAL:  Fatigue: yes  CARDIOVASCULAR:  Chest Pain with Exertion: no  PULMONARY:  Dyspnea on exertion: yes  CPAP Use: no  Tobacco Use: no  Asthma Controlled: yes  GASTROINTESTINAL:  GERD/Heartburn: yes  Gallbladder:   UROLOGIC:  Urinary Symptoms: no  NEUROLOGIC:  Headaches: rare when hungry  Paresthesias: no  PSYCHIATRIC:  Moods: depressed  MUSCULOSKELETAL/RHEUMATOLOGIC  Arthralgias: better  Myalgias: better  ENDOCRINE:  Monitoring Blood Sugars: no  Sugars Well Controlled: A1C was 5.8 and has lost 15# since then  DERMATOLOGIC:  Rashes: no    PHYSICAL  "EXAM:  Vitals:   Visit Vitals     /61 (Patient Site: Left Arm, Patient Position: Sitting, Cuff Size: Adult Large)     Pulse 89     Temp 98  F (36.7  C) (Oral)     Resp 18     Ht 5' 4\" (1.626 m)     Wt (!) 251 lb (113.9 kg)     SpO2 100%     BMI 43.08 kg/m2     Height: 5' 4\" (1.626 m) (1/6/2017 10:36 AM)  Initial Weight: 266.5 lbs (1/6/2017 10:36 AM)  Weight: 251 lb (113.9 kg) (1/6/2017 10:36 AM)  Weight loss from initial: 15.5 (1/6/2017 10:36 AM)  % Weight loss: 5.82 % (1/6/2017 10:36 AM)  BMI (Calculated): 43.1 (1/6/2017 10:36 AM)  SpO2: 100 % (1/6/2017 10:36 AM)  Waist Circumference (In): 47 Inches (10/28/2016 12:39 PM)  Hip Circumference (In): 52 Inches (10/28/2016 12:39 PM)  Neck Circumference (In): 16 Inches (10/28/2016 12:39 PM)  NSAIDS: Yes (10/28/2016 12:39 PM)  Pain Scale: 0 (10/28/2016 12:39 PM)    GEN: Pleasant, well groomed, in no acute disress  EYES: EOMI,  ENT: airway patent  NECK: no carotid bruits, no anterior/supraclavicular lymphadenopathy, thyroid normal   HEART: Rhythm regular, rate regular, no murmur   LUNGS: Clear  ABDOMEN: soft, non-tender, obese, no rashes   VASCULAR: trace  lower extremity edema  MUSCULOSKELETAL:  muscle mass WNL for age  SKIN:  no color changes of venous stasis, no ulcerations    Time spent with patients 30 minutes, >50% in counseling and coordination of care.        "

## 2021-06-08 NOTE — PROGRESS NOTES
"Russell Gomez is a 30 y.o. female who is being evaluated via a billable video visit.      The patient has been notified of following:     \"This video visit will be conducted via a call between you and your physician/provider. We have found that certain health care needs can be provided without the need for an in-person physical exam.  This service lets us provide the care you need with a video conversation.  If a prescription is necessary we can send it directly to your pharmacy.  If lab work is needed we can place an order for that and you can then stop by our lab to have the test done at a later time.    Video visits are billed at different rates depending on your insurance coverage. Please reach out to your insurance provider with any questions.    If during the course of the call the physician/provider feels a video visit is not appropriate, you will not be charged for this service.\"    Patient has given verbal consent to a Video visit? Yes    Video-Visit Details    Type of service:  Video Visit    Video End Time (time video stopped): 10:23 AM  Originating Location (pt. Location): Home    Distant Location (provider location):  [facility] GENERAL SURGERY AND BAR[facility]ATRICS C[facility]     Platform used for Video Visit: [email or web address]      Mariano Osborn, Ph.D,    Health and Behavior Assessment with Intervention, Initial (60 minutes): Met with patient 1:1 to obtain demographics and background information, reasons for surgery, typical eating pattern/fluid intake as well as psychiatric history. May is a 30-year-old single female who would like to follow through with bariatric surgery for health reasons.  She has struggled with her weight for much of her life and now is concerned about various comorbidities.  She has a history of depression and anxiety and is in psychotherapy currently.  She noted that she had premature twins a year ago which was quite traumatic.  She ended up having the fetuses cremated.  She has a history of " stress and emotional eating.  She also has gone through the nonsurgical weight management program at [facility] and saw this clinician in 2017.  She has good knowledge of the surgery and good support.  It may be helpful to connect with her psychotherapist.  She will follow-up and complete psychological testing.  Diagnoses: F 33.1; F 41.9; E 66.01

## 2021-06-08 NOTE — TELEPHONE ENCOUNTER
Prior Authorization Request  Who s requesting:  Pharmacy  Pharmacy Name and Location: [place], [place]  Medication Name: Diethylpropion  Insurance Plan: [employer] [facility] [place]  Insurance Member ID Number:  [id number]  CoverMyMeds Key: [id number]  Informed patient that prior authorizations can take up to 10 business days for response:   Yes  Okay to leave a detailed message: Yes

## 2021-06-08 NOTE — TELEPHONE ENCOUNTER
Central PA team  [phone]  Pool: HE PA MED ([id number])          PA has been initiated.       PA form completed and faxed insurance via Cover My Meds     Key:  [id number]     Medication:  diethylpropion 75 mg TbER     Insurance:  BLUE CROSS BLUE [facility] BLUE PLUSS        Response will be received via fax and may take up to 5-10 business days depending on plan

## 2021-06-08 NOTE — PROGRESS NOTES
Non-surgical Weight Loss Follow Up Diet Evaluation    Assessment:  This patient is a 26 y.o. female is being seen today for follow-up non-surgical nutritional evaluation. Today we reviewed the patients current eating habits and level of physical activity, and instructed on the changes that are required for successful weight loss outcomes.    Phentermine: 1/2 tab phen in AM and 1/2 tab at lunch     Pt's Initial Weight: 266.5 lbs  Weight: 250 lb 8 oz (113.6 kg)  Weight loss from initial: 16  % Weight loss: 6 %  BMI: Body mass index is 43 kg/(m^2).  IBW:  lbs    Personal goal weight: 170lbs    Estimated RMR ([place]-St Jeor equation): 1906 calories  Protein requirements (.5grams to .9grams per pound IBW, 20-30% of calories, minimum of 60-80gm per day):   grams    Progress made since last visit: Pt eating breakfast and lunch regularly, getting enough water and reading food labels for snacks 0 pt still losing weight just more slowly with added stress   Concerns: Pt reports high levels of stress - will eat fast food for comfort - talked about strategies to reduce stress eating and talked about healthy balanced snacks    Diet Recall/Time:   Breakfast: English muffin with 1 egg and cheese w/ orange (15g)  Am Snack: fruit   Lunch: Mobile with 2 slices protein, carrots and fruit (10g)  Pm snack: fruit   Dinner: canned chicken w/ wrap, vegs (15-20g) - lately been having fast food  HS Snack: none  ++reading food labels for snacks  -stress eating fast food  Protein: 40-45g    Typical Snacks: fruit  Meals per week away from home: 1-2; increased to 2-4X/week w/stress    Recommended limiting eating out to no more than 2x/week.  Patient and I reviewed the importance of eating three consistent meals per day; as well as meal timing to be spaced 4-5 hours apart.  Snack choices: 100-150 calories (1-2x/day if physically hungry), incorporating a fruit/vegetable w/ protein source.    Meal Duration: 15 minutes    Portion Sizes  problematic? YES per patient/diet recall  Encouraged slowing meal times down, 20-30 minutes, chewing to applesauce consistency.   To aid in proper portion control and slow meal time down discussed consuming meals off smaller plates, use toddler/children utensils and set utensils down after each bite.    Protein, vegetables/fruits, carbohydrates:   The patient and I discussed the importance of including lean/low fat protein at each meal and limiting carbohydrate intake to less than 25% of plate volume.     Beverages (Type/Oz. per day)  Water: 64oz    Discussed the importance of adequate hydration and the goal of 64+ oz of fluid daily.   The patient understands the importance of  avoiding all sweetened and alcoholic drinks, and instead choosing 64 oz plain water.    Exercise  ADL    Pt's understands that 45-60 minutes of daily activity is an important part of weight loss success.   Encouraged pt to incorporate  strength training exercise in addition to cardiovascular exercise most days of the week.    PES statement:     1. (NC-3.3.5) Obese, class III, BMI ?40 related to physical inactivity as evidenced by Infrequent, low-duration and or low intensity physical activity; and Large amounts of sedentary activities; no structured physical activity regimen     Intervention:  Discussion:  1. Educated pt on Eat Better, Move More, Live Well: Non-surgical Weight Loss Handout  2. Reviewed lean protein sources.  20gm protein at 3 meals daily.  grams daily total  3. Plate Method: The patient and I discussed the importance of including lean/low  fat protein at each meal and limiting carbohydrate intake to less  than 25% of plate volume.  Instructions/Goals:   1. Include 20gm protein at each meal. - increase protein at lunch  2. Increase vegetable/fruit intake, by having a vegetable or fruit with each meal daily. Recommended pt to increase vegetable/fruit intake to 4-5 servings daily.  3. Increase fluid intake to 64oz daily:  "choose plain or calorie/alcohol-free beverages.  4. Incorporate daily structured activity, 45-60 minutes most days of the week  5. Read food labels more consistently: keeping total fat grams <10, total sugar grams <10,  saturated fat <3gm per serving, fiber >3gm per serving.   6. Practice plate method: 1/2 plate lean/low fat protein source, vegetable/fruit, <25% of plate complex carbohydrates.  -2X/week  7. Carbohydrates from grain sources at meal times to be no more than 1 Carb Choice, ie: 15-20 gm total carbohydrate per serving  8. Practice eating off of smaller plates/bowls, chewing to applesauce consistency, taking 20-30 minutes to eat in a calm/relaxed environment without distractions of tv/email/cell phone.    Handouts Provided:  Plate Method  \"50 Things to do Instead of Snack  Snack Matrix    Monitor/Evaluation:    Pt will f/u in one month with bariatrician, and f/u in two to three months with RD.    Plan for next visit with RD:  Review plate method and food journal homework.  Educate pt on food labels  Review carbohydrates/fiber  Exercise      Time In: 8:41a  Time Out: 9:15a      ABN signed: No      "

## 2021-06-08 NOTE — PROGRESS NOTES
"Russell Gomez is a 30 y.o. female who is being evaluated via a billable video visit.      The patient has been notified of following:     \"This video visit will be conducted via a call between you and your physician/provider. We have found that certain health care needs can be provided without the need for an in-person physical exam.  This service lets us provide the care you need with a video conversation.  If a prescription is necessary we can send it directly to your pharmacy.  If lab work is needed we can place an order for that and you can then stop by our lab to have the test done at a later time.    Video visits are billed at different rates depending on your insurance coverage. Please reach out to your insurance provider with any questions.    If during the course of the call the physician/provider feels a video visit is not appropriate, you will not be charged for this service.\"    Patient has given verbal consent to a Video visit? Yes    Patient would like to receive their AVS by AVS Preference: Kushal.    Patient would like the video invitation sent by: Text to cell phone: [phone]    Will anyone else be joining your video visit? No    Video Start Time: 12:30p    Additional provider notes:       Initial Structured Weight Loss Supervised Diet Evaluation     Assessment:  Pt. is being seen today for initial RD nutritional evaluation. Pt. has been unsuccessful with non-surgical weight loss methods and is interested in bariatric surgery. Today we reviewed current eating habits and level of physical activity, and instructed on the changes that are required for successful bariatric outcomes.    Surgery of interest per pt: RNY.    +yesterday was 1 year anniversary of son's death    Workflow review:  Support Group: Not completed.  Psychology:In progress.  Lab work:Completed.  SWL:Yes 2/6    Weight goal: At or below initial.    Patient Active Problem List:  Patient Active Problem List   Diagnosis     Morbid " obesity (H)     Intermittent asthma     Herpes Simplex     Abnormal Pap smear of cervix     Adjustment disorder with depressed mood     Low back pain     Depression     Anemia     Fatigue     Acanthosis nigricans     Hirsutism     Vitamin D deficiency     Pregnancy test positive     History of  delivery, currently pregnant in first trimester     Syncope, unspecified syncope type     Bacterial vaginosis     Cervical insufficiency during pregnancy in second trimester, antepartum     Delayed delivery of second twin, triplet, etc.     Dichorionic diamniotic twin pregnancy in second trimester     Diet controlled gestational diabetes mellitus (GDM) in second trimester     HSV-2 infection complicating pregnancy, second trimester     Nausea and vomiting in pregnancy prior to 22 weeks gestation     Rh negative state in antepartum period, second trimester     Pre-syncope       Pt's Initial Weight: 260 lbs  Weight: (!) 260 lb (117.9 kg)  Weight loss from initial: 0  % Weight loss: 0 %    BMI: Body mass index is 45.33 kg/m .    Estimated RMR ([place]-St Jeor equation): 1910 calories    Food allergies, intolerances, and [employer]ism customs: NKFA    Diabetic: No  HbA1c:    Hemoglobin A1c   Date/Time Value Ref Range Status   2020 10:07 AM 5.1 3.5 - 6.0 % Final     Vitamins currently taking: prenatal    Biggest struggle with weight loss:stress and emotional, issues with binge eating    Diet/Weight History  Method or Diet: phentermine, met with RD about a year ago  # loss(-) or gain(+):15lb    Socioeconomic Status:  Who does the grocery shopping for your household? self  Where do you grocery shop?: [employer], [employer], [employer]  Utilizing food bank, food stamps, and/or meal delivery program(s)?: No -is trying to get on food stamps right now- it is sometimes difficult to get what her family needs    Who prepares your meals at home? self    Diet Recall/Time:   Breakfast: nutri-system double chocolate muffin with orange  Am Snack:  aura m&ms, drink tea and water  Lunch: apple with aldi protein meal bar or protein shake (20g)  Pm snack: none  Dinner: buttered noodles with steamed mixed veggies and fried chicken  HS Snack: none    Overnight eating: No    Per Diet recall estimated protein: 60-80 grams    Meals per week away from home: at least 4-5     Beverages (Type/Oz. per day)  Water: 64+oz+ flavor packets  Other: drinking bang energy drinks    Exercise  Not discussed    PES statement:   1. (NI-1.3)Excessive energy intake related to Food and nutrition related knowledge deficit concerning excessive energy/oral intake as evidenced by Intake of high caloric density foods/beverages (juice, soda, alcohol) at meals and/or snacks; large portions; frequent grazing; Estimated intake that exceeds estimated daily energy intake; Binge eating patterns; Frequent excessive fast food or restaurant intake; and BMI 46.56     Intervention    Nutrition Education:   1. Provided general overview of diet and lifestyle modifications needed to be a deemed a safe candidate for bariatric surgery.     Food/Nutrient Delivery:  2. Educated pt on eating three meals, with cutting out snacking.  3. Discussed importance of adequate hydration after surgery, with goal of at least 64 oz of fluids/day.  4. Addressed avoiding all carbonated, caffeinated and sweetened drinks to prepare for bariatric surgery.     Instructions/Goals:     1. Work on decreasing caffiene  2. Limit eating out to 1-2 per week        Monitor/Evaluation:  Pt. s target weight: no gain from initial visit, pt. verbalized understanding.     Plan for next visit:   Bariatric plate. Give food journal homework.  Review [place] to Bariatric Success    Video-Visit Details    Type of service:  Video Visit    Video End Time (time video stopped): 1:00p  Originating Location (pt. Location): Home    Distant Location (provider location):  [facility] GENERAL SURGERY AND BARIATRICS CARE     Platform used for Video Visit:  Doximity      Elva Fields RD
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
## 2021-06-09 NOTE — PROGRESS NOTES
"Russell Gomez is a 30 y.o. female who is being evaluated via a billable video visit.      The patient has been notified of following:     \"This video visit will be conducted via a call between you and your physician/provider. We have found that certain health care needs can be provided without the need for an in-person physical exam.  This service lets us provide the care you need with a video conversation.  If a prescription is necessary we can send it directly to your pharmacy.  If lab work is needed we can place an order for that and you can then stop by our lab to have the test done at a later time.    Video visits are billed at different rates depending on your insurance coverage. Please reach out to your insurance provider with any questions.    If during the course of the call the physician/provider feels a video visit is not appropriate, you will not be charged for this service.\"    Patient has given verbal consent to a Video visit? Yes    Will anyone else be joining your video visit? No    Video Start Time: 7:30a    Additional provider notes:     Follow Up Surgical Weight Loss Supervised Diet Evaluation    Assessment:  Pt. is being seen today for a follow up RD nutritional evaluation. Pt. has been unsuccessful with non-surgical weight loss methods and is interested in bariatric surgery. Today we reviewed current eating habits and level of physical activity, and instructed on the changes that are required for successful bariatric outcomes.    Surgery of interest per pt: RNY.    Workflow review:  Support Group: Completed.  Psychology:In progress.  Lab work:Completed.  SWL:Yes 3/6    Weight goal: At or below initial.    Patient Active Problem List:  Patient Active Problem List   Diagnosis     Morbid obesity (H)     Intermittent asthma     Herpes Simplex     Abnormal Pap smear of cervix     Adjustment disorder with depressed mood     Low back pain     Depression     Anemia     Fatigue     Acanthosis " nigricans     Hirsutism     Vitamin D deficiency     Pregnancy test positive     History of  delivery, currently pregnant in first trimester     Syncope, unspecified syncope type     Bacterial vaginosis     Cervical insufficiency during pregnancy in second trimester, antepartum     Delayed delivery of second twin, triplet, etc.     Dichorionic diamniotic twin pregnancy in second trimester     Diet controlled gestational diabetes mellitus (GDM) in second trimester     HSV-2 infection complicating pregnancy, second trimester     Nausea and vomiting in pregnancy prior to 22 weeks gestation     Rh negative state in antepartum period, second trimester     Pre-syncope       Pt's Initial Weight: 260 lbs  Weight: (!) 262 lb (118.8 kg)  Weight loss from initial: -2  % Weight loss: -0.77 %    BMI: Body mass index is 45.68 kg/m .    Estimated RMR ([place]-St Jeor equation): 1900 calories    Progress over past month: Has downoaded the myfitness pal bhumi and has been tracking food. Has been under a bit of stress due to trying to move out of her apartment.   1. Decrease caffeine- goal met  2. Limit eating out to 1-2 times per week- last time was Tuesday- goal met    Diabetic: No  HbA1c:    Hemoglobin A1c   Date/Time Value Ref Range Status   2020 10:07 AM 5.1 3.5 - 6.0 % Final     Diet Recall/Time:   Breakfast: whole grain nutri-grain bars, skim milk  Lunch: protein shake during weekdays  Dinner: struggling with what to eat- trying to keep salad    Meals per week away from home: 1 or less    Meal duration: 10-20 minutes- gets on Ipad and does something productive     Beverages (Type/Oz. per day)  Water: 5 bottle water per day at least     fluids by 30 minutes before, during meal, and waiting 30 minutes after meal before drinking fluids: Yes    Exercise  Type: Trying to move when able- does video games (dancing), tracks on apple watch    PES statement:   1. (NI-1.3)Excessive energy intake related to Food and  nutrition related knowledge deficit concerning excessive energy/oral intake as evidenced by Intake of high caloric density foods/beverages (juice, soda, alcohol) at meals and/or snacks; large portions; frequent grazing; Estimated intake that exceeds estimated daily energy intake; Binge eating patterns; Frequent excessive fast food or restaurant intake; and BMI 45.68     Intervention    Nutrition Education:   1. Provided general overview of diet and lifestyle modifications needed to be a deemed a safe candidate for bariatric surgery.   2.   Food/Nutrient Delivery:  3. Educated pt on eating three meals, with cutting out snacking.  4. Bariatric Plate: Pt and I discussed the importance of including a lean protein source (20-30 grams/meal), vegetables (included at lunch and dinner), one serving (15g) of carbohydrate, and limited added fat (1 tb/day) at each meal.   5. Educated pt on how to complete a food journal and benefits of meal planning.   6. Educated pt on using a protein powder drink as a meal replacement and/or supplement after bariatric surgery.   7. Discussed importance of adequate hydration after surgery, with goal of at least 64 oz of fluids/day.  8. Addressed avoiding all carbonated, caffeinated and sweetened drinks to prepare for bariatric surgery.     Nutrition Counselin. Mindful eating techniques: Encouraged slow meal pace, chewing foods to applesauce consistency for 20-30 minutes/meal.   10. Discussed  fluids 30 minutes before, during, and after meal to prevent dumping syndrome and discomfort post bariatric surgery.       Instructions/Goals:     1. Track food on myfitness pal  2. Continue with bariatric guidelines- eating slowly and chewing well    Handouts Provided:   Pt. [facility] Bariatric Care Patient Handbook  Bariatric Plate  Food journal  Lean Protein sources  Food Label      Monitor/Evaluation:  Pt. s target weight:  no gain from initial visit, pt. verbalized  understanding.     Plan for next visit:   Review Bariatric plate  Educate on dumping syndrome and reading food labels.  Review Keys to Bariatric Success    Video-Visit Details    Type of service:  Video Visit    Video End Time (time video stopped): 7:55a  Originating Location (pt. Location): Home    Distant Location (provider location):  [facility] GENERAL SURGERY AND BARIATRICS CARE     Platform used for Video Visit: Reginald Fields RD

## 2021-06-09 NOTE — TELEPHONE ENCOUNTER
I spoke with pt over phone and discussed her concerns.  We're not able to say exactly when she got the infection.  All her questions were answered.

## 2021-06-09 NOTE — PROGRESS NOTES
Subjective:     Russell Gomez is a 30 y.o. female who presents for an annual exam.     Other concerns today:  1.  STD screen.  No symptoms, but would like screening test today.    She is not currently sexually active.  She says she is focusing on herself.  He is not planning on becoming sexually active in the near future.  We discussed that it can be helpful to plan ahead with birth control.  She declines birth control today.    Works at [employer] in customer service, job is stressful.  She reports she recently lost twins with premature delivery during pregnancy.  I reviewed Dr. Rodney's last note with patient:  Presented with ruptured membranes and eventual delivery at 21 wks of twin boys and eventual demise.    Was told cervix not holding, was told could do something to hold close but nothing was done and babies lost.    She has a healthy son at home.  Chronic back pain, in part due to her weight.  She is getting minimal exercise.  She is taking phentermine from bariatric clinic.    Immunization History   Administered Date(s) Administered     DTP 1990, 1990, 1990, 1991, 1994     Hep B, Peds, Historic 1996, 1997, 1999, 10/24/2000     HiB, historic,unspecified 1990, 1991     IPV 1990, 1990, 1991, 1994     Influenza, inj, historic,unspecified 09/15/2016     MMR 07/15/1991, 1997, 1998     Td,adult,historic,unspecified 2002, 2012     Tdap 2020     Varicella 1999       Gynecologic History  Patient's last menstrual period was 06/15/2020 (exact date).  Contraception: none  Last Pap: 2017  Last mammogram: n/a    OB History    Para Term  AB Living   4 1 0 1 2 1   SAB TAB Ectopic Multiple Live Births   0 0 0 0 1      # Outcome Date GA Lbr Gagan/2nd Weight Sex Delivery Anes PTL Lv   4             3 AB            2 AB            1      M Vag-Spont   EDEN         Current Outpatient  Medications:      phentermine (ADIPEX-P) 37.5 mg tablet, Take 1/2 to 1 tablet in the morning., Disp: 90 tablet, Rfl: 1     PRENATAL VITAMIN 27 mg iron- 0.8 mg Tab tablet, Take 1 tablet by mouth daily., Disp: , Rfl:      promethazine (PHENERGAN) 25 MG tablet, Take 25 mg by mouth., Disp: , Rfl:      albuterol (PROAIR HFA) 90 mcg/actuation inhaler, Inhale 1-2 puffs every 4 (four) hours as needed for wheezing., Disp: 1 Inhaler, Rfl: 1  Past Medical History:   Diagnosis Date     Acanthosis nigricans      Accessory skin tags      Acute Gonorrhea     Created by Conversion      Anemia      Bacterial vaginosis 5/3/2019    3/21/19     Cervical insufficiency during pregnancy in second trimester, antepartum 5/3/2019     Dichorionic diamniotic twin pregnancy in second trimester 5/3/2019     Diet controlled gestational diabetes mellitus (GDM) in second trimester 2019     Heartburn      Hirsutism      History of  delivery, currently pregnant in first trimester 2019     Low back pain      Menorrhagia       labor 2/3/2011     Rotator Cuff Tendonitis     Created by Conversion      Past Surgical History:   Procedure Laterality Date     TONSILLECTOMY       Blood-group specific substance and Penicillins  Family History   Problem Relation Age of Onset     Hypertension Mother      Asthma Mother      Obesity Sister      Diabetes Paternal Aunt      Obesity Paternal Aunt      Diabetes Paternal Grandfather      Obesity Maternal Aunt      Arthritis Maternal Grandmother      Stroke Paternal Grandmother      Arthritis Paternal Grandmother      Hypertension Son      Social History     Socioeconomic History     Marital status: Single     Spouse name: Not on file     Number of children: Not on file     Years of education: Not on file     Highest education level: Not on file   Occupational History     Not on file   Social Needs     Financial resource strain: Not on file     Food insecurity     Worry: Not on file     Inability:  Not on file     Transportation needs     Medical: Not on file     Non-medical: Not on file   Tobacco Use     Smoking status: Never Smoker     Smokeless tobacco: Never Used   Substance and Sexual Activity     Alcohol use: No     Drug use: No     Sexual activity: Yes     Partners: Male     Birth control/protection: Condom   Lifestyle     Physical activity     Days per week: Not on file     Minutes per session: Not on file     Stress: Not on file   Relationships     Social connections     Talks on phone: Not on file     Gets together: Not on file     Attends [employer] service: Not on file     Active member of club or organization: Not on file     Attends meetings of clubs or organizations: Not on file     Relationship status: Not on file     Intimate partner violence     Fear of current or ex partner: Not on file     Emotionally abused: Not on file     Physically abused: Not on file     Forced sexual activity: Not on file   Other Topics Concern     Not on file   Social History Narrative    Single, one son, Son's father is in [place] and not involved    Close with her father who lives in [place]    Not close with her mother who lives here     Miscarried twin boys       Review of Systems  Complete Review of Systems is discussed with patient and is negative except as noted in HPI.    Objective:     Vitals:    07/16/20 1024   BP: 129/82   Pulse: 85     Body mass index is 44.41 kg/m .    Physical Exam:  General: Patient is alert and Oriented x 3, in no apparent distress.  HEENT, Thyroid, Lymphatic, Cardiac, Pulmonary, GI, Musculoskeletal, and Neuro exams were completed today and grossly normal.  Breast Exam: No lumps, skin changes, lymphadenopathy, or nipple discharge noted bilaterally.  Genitourinary Exam: External genitalia is normal in appearance, vaginal walls are healthy and without lesions, no significant discharge noted in the vaginal vault, cervix is well visualized and normal in appearance.  Pap was taken without  difficulty.    Results for orders placed or performed in visit on 07/16/20   Wet Prep, Vaginal    Specimen: Genital   Result Value Ref Range    Yeast Result No yeast seen No yeast seen    Trichomonas No Trichomonas seen No Trichomonas seen    Clue Cells, Wet Prep No Clue cells seen No Clue cells seen   Other labs pending.    Assessment and Plan:     1. Annual physical exam  Health Maintenance discussed with patient as appropriate for age and risk factors.  Screening Pap completed today.  Tdap given.  She declines offer of birth control, see HPI for further discussion.  - Gynecologic Cytology (PAP Smear)    2. Morbid obesity (H)  Following with bariatric center, taking phentermine, planning on having gastric bypass surgery in the next few months.    3. Screen for STD (sexually transmitted disease)  Patient will be informed of results when available.  - Chlamydia trachomatis & Neisseria gonorrhoeae, Amplified Detection  - Wet Prep, Vaginal    4. Mild intermittent asthma without complication  Well-controlled, has albuterol to use as needed.    5.  Major depression.  Moods are sometimes up and down.  Overall she feels like depression is generally well controlled.  She is just had to see a new therapist and finds this helpful.  Continue to monitor.  She is not taking any medication for depression.      The following high BMI interventions were performed this visit: weight monitoring    This dictation uses voice recognition software, which may contain typographical errors.

## 2021-06-09 NOTE — PROGRESS NOTES
"Russell Gomez is a 30 y.o. female who is being evaluated via a billable video visit.      The patient has been notified of following:     \"This video visit will be conducted via a call between you and your physician/provider. We have found that certain health care needs can be provided without the need for an in-person physical exam.  This service lets us provide the care you need with a video conversation.  If a prescription is necessary we can send it directly to your pharmacy.  If lab work is needed we can place an order for that and you can then stop by our lab to have the test done at a later time.    Video visits are billed at different rates depending on your insurance coverage. Please reach out to your insurance provider with any questions.    If during the course of the call the physician/provider feels a video visit is not appropriate, you will not be charged for this service.\"    Patient has given verbal consent to a Video visit? Yes  How would you like to obtain your AVS? AVS Preference: MyChart.  If dropped by the video visit, the video invitation should be sent to: Send to e-mail at: [email or web address]  Will anyone else be joining your video visit? No    Video Start Time: 7:34 AM    Additional provider notes:     Follow Up Surgical Weight Loss Supervised Diet Evaluation    Assessment:  Pt. is being seen today for a follow up RD nutritional evaluation. Pt. has been unsuccessful with non-surgical weight loss methods and is interested in bariatric surgery. Today we reviewed current eating habits and level of physical activity, and instructed on the changes that are required for successful bariatric outcomes.    Surgery of interest per pt: RNY.    Workflow review:  Support Group: Completed.  Psychology:Completed.  Lab work:Completed.  SWL:Yes 4/6  PAP completed    Weight goal: At or below initial.    Patient Active Problem List:  Patient Active Problem List   Diagnosis     Morbid obesity (H)     " Intermittent asthma     Herpes Simplex     Abnormal Pap smear of cervix     Adjustment disorder with depressed mood     Low back pain     Episode of recurrent major depressive disorder (H)     Anemia     Acanthosis nigricans     Hirsutism     Vitamin D deficiency     Rh negative state in antepartum period, second trimester     Pre-syncope     Chlamydia infection       Pt's Initial Weight: 260 lbs  Weight: (!) 256 lb 6.4 oz (116.3 kg)  Weight loss from initial: 3.6  % Weight loss: 1.38 %    BMI: Body mass index is 44.71 kg/m .    Estimated RMR (Giles-St Jeor equation): 1862 calories    Progress over past month: is trying to stay active, playing just dance game  Patient states she has changed her eating and portions sizes- using [employer]-  Is taking phentermine- this is going well   Has found other things to do instead of snacking- reading, taking a nap, dancing, etc.     Diabetic: No  HbA1c:    Hemoglobin A1c   Date/Time Value Ref Range Status   04/30/2020 10:07 AM 5.1 3.5 - 6.0 % Final     Per Diet recall estimated protein: 60-80 grams    Meals per week away from home: rare- has been really working on cutting this back- none in the past 3 weeks    Meal duration: 20 minutes.     Beverages (Type/Oz. per day)  Water: drinking all day long  Speciality Coffee: norther light or caribou cooler once a week  No pop      fluids by 30 minutes before, during meal, and waiting 30 minutes after meal before drinking fluids: Yes    Exercise  Type: more active- using dancing video game at least an hour per day    PES statement:   1. (NI-1.3)Excessive energy intake related to Food and nutrition related knowledge deficit concerning excessive energy/oral intake as evidenced by Intake of high caloric density foods/beverages (juice, soda, alcohol) at meals and/or snacks; large portions; frequent grazing; Estimated intake that exceeds estimated daily energy intake; Binge eating patterns; Frequent excessive fast food or  restaurant intake; and BMI 44.71     Intervention    Nutrition Education:   1. Provided general overview of diet and lifestyle modifications needed to be a deemed a safe candidate for bariatric surgery.     Food/Nutrient Delivery:  2. Educated pt on eating three meals, with cutting out snacking.  3. Educated pt on using a protein powder drink as a meal replacement and/or supplement after bariatric surgery.   4. Discussed importance of adequate hydration after surgery, with goal of at least 64 oz of fluids/day.  5. Addressed avoiding all carbonated, caffeinated and sweetened drinks to prepare for bariatric surgery.     Nutrition Counselin. Mindful eating techniques: Encouraged slow meal pace, chewing foods to applesauce consistency for 20-30 minutes/meal.   7. Discussed  fluids 30 minutes before, during, and after meal to prevent dumping syndrome and discomfort post bariatric surgery.     Handouts Provided:   Pt. brought [facility] Patient Handbook      Monitor/Evaluation:  Pt. s target weight:  no gain from initial visit, pt. verbalized understanding.     Plan for next visit:   Educate on dumping syndrome and reading food labels.  Review Keys to Bariatric Success      Video-Visit Details    Type of service:  Video Visit    Video End Time (time video stopped): 7:55 AM  Originating Location (pt. Location): Home    Distant Location (provider location):  [facility] SUR[facility]ERY [facility]     Platform used for Video Visit: Bob Fields RD

## 2021-06-09 NOTE — PROGRESS NOTES
Attended Support Group. Workflow updated.    Trever Philippe CMA  [facility]   P: [phone]  F: [phone]
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
## 2021-06-10 NOTE — PROGRESS NOTES
"Russell Gomez is a 30 y.o. female who is being evaluated via a billable video visit.      The patient has been notified of following:     \"This video visit will be conducted via a call between you and your physician/provider. We have found that certain health care needs can be provided without the need for an in-person physical exam.  This service lets us provide the care you need with a video conversation.  If a prescription is necessary we can send it directly to your pharmacy.  If lab work is needed we can place an order for that and you can then stop by our lab to have the test done at a later time.    Video visits are billed at different rates depending on your insurance coverage. Please reach out to your insurance provider with any questions.    If during the course of the call the physician/provider feels a video visit is not appropriate, you will not be charged for this service.\"    Patient has given verbal consent to a Video visit? Yes  How would you like to obtain your AVS? AVS Preference: MyChart.  If dropped by the video visit, the video invitation should be sent to: Text to cell phone: [phone]  Will anyone else be joining your video visit? No        Video Start Time: 7:30a      Video-Visit Details    Type of service:  Video Visit    Video End Time (time video stopped): 7:55a  Originating Location (pt. Location): Home    Distant Location (provider location):  [facility] GENERAL SURGERY AND BARIATRICS CARE     Platform used for Video Visit: Bob Fields RD      Follow Up Surgical Weight Loss Supervised Diet Evaluation    Assessment:  Pt. is being seen today for a follow up RD nutritional evaluation. Pt. has been unsuccessful with non-surgical weight loss methods and is interested in bariatric surgery. Today we reviewed current eating habits and level of physical activity, and instructed on the changes that are required for successful bariatric outcomes.    Surgery of interest per pt: " "RNY.  +taking phentermine    Workflow review:  Support Group: Completed.  Psychology:Completed.  Lab work:Completed.  SWL:Yes 5/6      Weight goal: At or below initial.    Patient Active Problem List:  Patient Active Problem List   Diagnosis     Morbid obesity (H)     Intermittent asthma     Herpes Simplex     Abnormal Pap smear of cervix     Adjustment disorder with depressed mood     Low back pain     Episode of recurrent major depressive disorder (H)     Anemia     Acanthosis nigricans     Hirsutism     Vitamin D deficiency     Rh negative state in antepartum period, second trimester     Pre-syncope     Chlamydia infection       Pt's Initial Weight: 260 lbs  Weight: (!) 250 lb (113.4 kg)  Weight loss from initial: 10  % Weight loss: 3.85 %    BMI: Body mass index is 43.94 kg/m .    Estimated RMR (Hill-St Jeor equation): 1825 calories    Progress over past month: still doing just dance video game for about 2 hours 6-7 days per week  Is making \"leaner\" choices with proteins such as turkey chili and swiss cheese. Snacking on nuts if needed.  +Is using Skinny Fit collagen peptides (7g)    Diabetic: No  HbA1c:    Hemoglobin A1c   Date/Time Value Ref Range Status   04/30/2020 10:07 AM 5.1 3.5 - 6.0 % Final     Diet Recall/Time:   Breakfast: premier protein (30g)  Lunch: slim fast protein shake OR salad  Dinner: salad with chicken strips    Meal duration: 20 minutes.     Beverages (Type/Oz. per day)  Water: 64+oz  +sugar free flavoring   Has stopped getting northern light latte     fluids by 30 minutes before, during meal, and waiting 30 minutes after meal before drinking fluids: Yes    Exercise  Type: yes- dancing 2 hours most days of the week    PES statement:   1. (NI-1.3)Excessive energy intake related to Food and nutrition related knowledge deficit concerning excessive energy/oral intake as evidenced by Intake of high caloric density foods/beverages (juice, soda, alcohol) at meals and/or snacks; large " portions; frequent grazing; Estimated intake that exceeds estimated daily energy intake; Binge eating patterns; Frequent excessive fast food or restaurant intake; and BMI 43.94     Intervention    Nutrition Education:   1. Provided general overview of diet and lifestyle modifications needed to be a deemed a safe candidate for bariatric surgery.   2. Educated pt on how to read a food label: choosing foods with than 10 grams fat and 10 grams sugar per serving to avoid dumping syndrome.  3. Dumping Syndrome: Described the mechanisms of syndrome, symptoms, and prevention tools from a dietary perspective.     Food/Nutrient Delivery:  4. Educated pt on eating three meals, with cutting out snacking.  5. Educated pt on using a protein powder drink as a meal replacement and/or supplement after bariatric surgery.   6. Discussed importance of adequate hydration after surgery, with goal of at least 64 oz of fluids/day.  7. Addressed avoiding all carbonated, caffeinated and sweetened drinks to prepare for bariatric surgery.     Nutrition Counselin. Mindful eating techniques: Encouraged slow meal pace, chewing foods to applesauce consistency for 20-30 minutes/meal.   9. Discussed  fluids 30 minutes before, during, and after meal to prevent dumping syndrome and discomfort post bariatric surgery. .       Instructions/Goals:     1. Read labels for 10-10 rule    Handouts Provided:   Pt. [facility] Patient Handbook      Monitor/Evaluation:  Pt. s target weight: no gain from initial visit, pt. verbalized understanding.     Plan for next visit:   (Final Supervised Diet visit with RD) pre/post-op  diet progression, give review of surgery process.  Review [place] to Bariatric Success

## 2021-06-10 NOTE — PROGRESS NOTES
"Bariatric Follow-up    27 y.o.  female BMI:Body mass index is 44.63 kg/(m^2).    Weight:   Wt Readings from Last 1 Encounters:   04/21/17 (!) 260 lb (117.9 kg)    pounds  Height: 5' 4\" (1.626 m) (4/21/2017 10:34 AM)  Initial Weight: 266.5 lbs (2/14/2017  8:00 AM)  Weight: 260 lb (117.9 kg) (4/21/2017 10:34 AM)  Weight loss from initial: 16 (2/14/2017  8:00 AM)  % Weight loss: 6 % (2/14/2017  8:00 AM)  BMI (Calculated): 44.6 (4/21/2017 10:34 AM)  SpO2: 100 % (4/21/2017 10:34 AM)  Waist Circumference (In): 47 Inches (10/28/2016 12:39 PM)  Hip Circumference (In): 52 Inches (10/28/2016 12:39 PM)  Neck Circumference (In): 16 Inches (10/28/2016 12:39 PM)  NSAIDS: Yes (10/28/2016 12:39 PM)  Pain Scale: 0 (10/28/2016 12:39 PM)    Comorbidities:  Patient Active Problem List   Diagnosis     Morbid obesity     Intermittent Asthma     Herpes Simplex     Abnormal Pap smear of cervix     Adjustment disorder with depressed mood     Low back pain     Heartburn     Depression     Asthma     Menorrhagia     Anemia     Fatigue     Acanthosis nigricans     Hirsutism     Accessory skin tags     Vitamin D deficiency     Interim: stress eating. Has lost 6# overall. Her boyfriend's mom is sick.  She is eating very well but then stress eats for example at [facility], [facility] and [facility] with snacks HS. She was down to 250#. She ran out of her phentermine. She has increased eating out. B and L are consistent but second half of the day she is stress eating    Plan: Schedule appointment with Dr. Osborn or Dr. Pate. Support through [facility].   Try to take a walk a day with Piero. Calisthenics such as 7 minute work out  -We reviewed her medications and whether associated with weight gain.    We discussed [facility] Bariatric Basics including:  -eating 3 meals daily  -eating protein first  -eating slowly, chewing food well  -avoiding/limiting calorie containing beverages  -choosing wheat, not white with breads, crackers, pastas, " edwina, bagels, tortillas, rice  -limiting restaurant or cafeteria eating to twice a week or less  -We discussed the importance of restorative sleep and stress management in maintaining a healthy weight.  -We discussed insulin resistance and glycemic index as it relates to appetite and weight control  -We discussed the National Weight Control Registry healthy weight maintenance strategies and ways to optimize metabolism.  -We discussed the importance of physical activity including cardiovascular and strength training in maintaining a healthier weight and explored viable options.    Most recent labs:  Lab Results   Component Value Date    WBC 9.6 10/28/2016    HGB 11.8 (L) 10/28/2016    HCT 37.9 10/28/2016    MCV 72 (L) 10/28/2016     10/28/2016     Lab Results   Component Value Date    CHOL 138 10/28/2016     Lab Results   Component Value Date    HDL 52 10/28/2016     Lab Results   Component Value Date    LDLCALC 71 10/28/2016     Lab Results   Component Value Date    TRIG 74 10/28/2016     No components found for: CHOLHDL  Lab Results   Component Value Date    ALT 26 (L) 12/29/2011    AST 15 12/29/2011    ALKPHOS 99 12/29/2011    BILITOT 0.37 12/29/2011     Lab Results   Component Value Date    HGBA1C 5.8 10/28/2016     Lab Results   Component Value Date    HWDLRMFQ16 775 10/28/2016     Lab Results   Component Value Date    OEDSABEV34HT 18.2 (L) 10/28/2016     Lab Results   Component Value Date    FERRITIN 18 10/28/2016     Lab Results   Component Value Date    PTH 82 10/28/2016     No results found for: 27134  No results found for: 7597  Lab Results   Component Value Date    TSH 1.00 10/21/2016       DIETARY HISTORY  Meals Per Day: 3  Eating Protein First?: yes  Food Diary: B:egg, cheese, WW muffin, orange L:WW, lunch meat, carrots, apple D:stress eating fast food  Snacks Per Day: 2+  Typical Snack: pistaccios, string cheese, pistaccios, fruit  Fluid Intake: intentional  Portion Control: problematic at  "night  Calorie Containing Beverages: no  Alcohol per week: none  Typical Protein Food Choices: good variety  Choosing Whole Grains: yes  Grocery Shopping is done by: herself  Food Preparation is done by: herself  Meals at Restaurant/Cafeteria/Take Out Per Week: >4 now  Eating at the Table: sometimes table, couch, bed  TV is Off During Meals: no    Positive Changes Since Last Visit: recognizes stress eating and need to address  Struggling With: stress eating    Knowledgeable in Reading Food Labels: yes  Getting Adequate Protein: yes  Sleeping 7-8 hours/day poorly. 7 hours interrupted  Stress management difficult    PHYSICAL ACTIVITY PATTERNS:  Cardiovascular: belongs to the gym, [employer]- hasn't been able to go. Looking for an exercise bike.  Strength Training: none lately    REVIEW OF SYSTEMS  GENERAL/CONSTITUTIONAL:  Fatigue: yes  CARDIOVASCULAR:  Chest Pain with Exertion: no  PULMONARY:  Dyspnea on exertion: yes  CPAP Use: no  Tobacco Use: no  Asthma Controlled: yes  GASTROINTESTINAL:  GERD/Heartburn: no  Gallbladder:  UROLOGIC:  Urinary Symptoms: no  NEUROLOGIC:  Headaches: rare  Paresthesias: no  PSYCHIATRIC:  Moods: stressed  MUSCULOSKELETAL/RHEUMATOLOGIC  Arthralgias: LBP  Myalgias: LBP  ENDOCRINE:  Monitoring Blood Sugars: no  Sugars Well Controlled: yes  DERMATOLOGIC:  Rashes: no    PHYSICAL EXAM:  Vitals: /55  Pulse 64  Resp 18  Ht 5' 4\" (1.626 m)  Wt (!) 260 lb (117.9 kg)  SpO2 100%  BMI 44.63 kg/m2  Height: 5' 4\" (1.626 m) (4/21/2017 10:34 AM)  Initial Weight: 266.5 lbs (2/14/2017  8:00 AM)  Weight: 260 lb (117.9 kg) (4/21/2017 10:34 AM)  Weight loss from initial: 16 (2/14/2017  8:00 AM)  % Weight loss: 6 % (2/14/2017  8:00 AM)  BMI (Calculated): 44.6 (4/21/2017 10:34 AM)  SpO2: 100 % (4/21/2017 10:34 AM)  Waist Circumference (In): 47 Inches (10/28/2016 12:39 PM)  Hip Circumference (In): 52 Inches (10/28/2016 12:39 PM)  Neck Circumference (In): 16 Inches (10/28/2016 12:39 PM)  NSAIDS: Yes " (10/28/2016 12:39 PM)  Pain Scale: 0 (10/28/2016 12:39 PM)    GEN: Pleasant, well groomed, in no acute disress  EYES: EOMI,  ENT: airway patent  NECK: no carotid bruits, no anterior/supraclavicular lymphadenopathy, thyroid normal   HEART: Rhythm regular, rate regular, no murmur   LUNGS: Clear  ABDOMEN: soft, non-tender, obese, no rashes   VASCULAR: no  lower extremity edema  MUSCULOSKELETAL:  muscle mass WNL for age  SKIN:  no color changes of venous stasis, no ulcerations    Time spent with patients 30 minutes, >50% in counseling and coordination of care.

## 2021-06-10 NOTE — PROGRESS NOTES
Health and Behavior Assessment with Intervention, Initial (60 minutes): Met with patient 1:1 to obtain demographics and background information, reasons for surgery, typical eating pattern/fluid intake as well as psychiatric history. Russell is a 27-year-old single -American mother of one child (6).  She came to see this clinician by referral of Dr. Pierre as she is looking to follow through nonsurgical weight management at [facility].  She has struggled with her weight since pregnancy and now is concerned about various comorbidities.  She has a significant history of depression and anxiety and has never been in psychotherapy or on a psychotropic medication trial.  She has engaged in binge eating and night eating episodes.  She has had fleeting suicidal ideation without a plan or attempt in the past.  She was provided with 2 psychology clinic referrals in [place] and will follow up with this clinician if necessary.  Diagnoses: F 33.1; F 41.9; E 66.01

## 2021-06-11 NOTE — PROGRESS NOTES
"Russell Gomez is a 30 y.o. female who is being evaluated via a billable video visit.      The patient has been notified of following:     \"This video visit will be conducted via a call between you and your physician/provider. We have found that certain health care needs can be provided without the need for an in-person physical exam.  This service lets us provide the care you need with a video conversation.  If a prescription is necessary we can send it directly to your pharmacy.  If lab work is needed we can place an order for that and you can then stop by our lab to have the test done at a later time.    Video visits are billed at different rates depending on your insurance coverage. Please reach out to your insurance provider with any questions.    If during the course of the call the physician/provider feels a video visit is not appropriate, you will not be charged for this service.\"    Patient has given verbal consent to a Video visit? Yes  How would you like to obtain your AVS? AVS Preference: MyChart.  If dropped by the video visit, the video invitation should be sent to: Send to e-mail at: [email or web address]  Will anyone else be joining your video visit? No    Video Start Time: 7:28 AM    Video-Visit Details    Type of service:  Video Visit    Video End Time (time video stopped): 7:46 AM  Originating Location (pt. Location): Home    Distant Location (provider location):  [facility] GENERAL SURGERY AND [facility]RICS CARE     Platform used for Video Visit: Reginald Fields RD      Follow Up Surgical Weight Loss Supervised Diet Evaluation    Assessment:  Pt. is being seen today for a follow up RD nutritional evaluation. Pt. has been unsuccessful with non-surgical weight loss methods and is interested in bariatric surgery. Today we reviewed current eating habits and level of physical activity, and instructed on the changes that are required for successful bariatric outcomes.    Surgery of interest per " pt: ERNESTO.    Workflow review:  Support Group: Completed.  Psychology:Completed.  Lab work:Completed.  SWL:Yes 6/6    Weight goal: At or below initial.    Patient Active Problem List:  Patient Active Problem List   Diagnosis     Morbid obesity (H)     Intermittent asthma     Herpes Simplex     Abnormal Pap smear of cervix     Adjustment disorder with depressed mood     Low back pain     Episode of recurrent major depressive disorder (H)     Anemia     Acanthosis nigricans     Hirsutism     Vitamin D deficiency     Rh negative state in antepartum period, second trimester     Pre-syncope     Chlamydia infection       Pt's Initial Weight: 260 lbs  Weight: (!) 241 lb (109.3 kg)  Weight loss from initial: 19  % Weight loss: 7.31 %    BMI: Body mass index is 42.35 kg/m .    Estimated RMR (Bluefield-St Jeor equation): 1789 calories    Progress over past month: Has been consistent with diet changes, dinner is a salad, protein shake in the morning, consistently exercising     Diabetic: No  HbA1c:    Hemoglobin A1c   Date/Time Value Ref Range Status   04/30/2020 10:07 AM 5.1 3.5 - 6.0 % Final       Meal duration: 20-30 minutes.     Beverages (Type/Oz. per day)  Water: 64+oz  Coffee: none  Speciality Coffee: none     fluids by 30 minutes before, during meal, and waiting 30 minutes after meal before drinking fluids: Yes    Exercise  Type: 1-2 hours per day- continues to dance with video game    PES statement:   1. (NI-1.3)Excessive energy intake related to Food and nutrition related knowledge deficit concerning excessive energy/oral intake as evidenced by Intake of high caloric density foods/beverages (juice, soda, alcohol) at meals and/or snacks; large portions; frequent grazing; Estimated intake that exceeds estimated daily energy intake; and BMI 42.35     Intervention    Nutrition Education:   1. Provided general overview of diet and lifestyle modifications needed to be a deemed a safe candidate for bariatric surgery.    2. Vitamins: Educated on post-op vitamin regimen including MVI+ 18 mg Fe two times a day, calcium citrate 400-600 mg two times a day, 2474-3191 mcg sublingual B12 daily, 5000 IU vitamin D3 daily.     Food/Nutrient Delivery:  3. Educated pt on eating three meals, with cutting out snacking.  4. Educated pt on using a protein powder drink as a meal replacement and/or supplement after bariatric surgery.   5. Discussed importance of adequate hydration after surgery, with goal of at least 64 oz of fluids/day.  6. Addressed avoiding all carbonated, caffeinated and sweetened drinks to prepare for bariatric surgery.     Nutrition Counselin. Mindful eating techniques: Encouraged slow meal pace, chewing foods to applesauce consistency for 20-30 minutes/meal.   8. Discussed  fluids 30 minutes before, during, and after meal to prevent dumping syndrome and discomfort post bariatric surgery.   9. Discussed pre/post operative diet progression, post op vitamin regimen, gave review of surgery process.     Handouts Provided:   Pt. [facility] Bariatric Care Patient Handbook      Monitor/Evaluation:  Pt. s target weight:  no gain from initial visit, pt. verbalized understanding.     Pt has completed all nutrition requirements and is well-informed of the dietary and physical activity requirements that are necessary for successful bariatric outcomes. This pt is an appropriate candidate for surgery from a nutrition standpoint at this time. The patient understands that surgery is a tool, not a cure, and post operative follow up is essential.    Plan for next visit:   3 month post op    Elva Fields RD

## 2021-06-11 NOTE — PROGRESS NOTES
"Russell Gomez is a 30 y.o. female who is being evaluated via a billable video visit.      The patient has been notified of following:     \"This video visit will be conducted via a call between you and your physician/provider. We have found that certain health care needs can be provided without the need for an in-person physical exam.  This service lets us provide the care you need with a video conversation.  If a prescription is necessary we can send it directly to your pharmacy.  If lab work is needed we can place an order for that and you can then stop by our lab to have the test done at a later time.    Video visits are billed at different rates depending on your insurance coverage. Please reach out to your insurance provider with any questions.    If during the course of the call the physician/provider feels a video visit is not appropriate, you will not be charged for this service.\"    Patient has given verbal consent to a Video visit? Yes  How would you like to obtain your AVS? AVS Preference: MyChart.  If dropped by the video visit, the video invitation should be sent to: Text to cell phone: [phone]  Will anyone else be joining your video visit? No        Video Start Time: 1:30 pm    Additional provider notes:    HPI: Russell Gomez is a 30 y.o. female here today for consideration of metabolic and bariatric surgery. She is referred by Dr. Rito Mills.  She has struggled with obesity for the past 20 years, noting obesity issues in her early teens.  She had made multiple attempts at self directed weight loss before starting work with our weight management program in 2016.  Since then attempts at obesity management include use of phentermine and naltrexone.  After several years of working with our bariatricians on non-surgical weight loss, she elected to pursue surgical weight loss due to difficulty fitting in extensive exercise with her family schedule. She became frustrated with the waxing and " waning weight loss/gain associated with diet.  Had some significant life stressors including a miscarriage of twins that derailed some of her weight loss efforts.    Her goals are to overall be more active and healthier, in particular with regard to pre-term deliveries that plagued all her pregnancies thus far along with gestational diabetes.    Her bariatric comorbidities include gestational GERD, depression, low back pain.    Bariatric comorbidities not present include HTN, type II DM, ANGELICA.      Allergies:Blood-group specific substance and Penicillins    Past Medical History:   Diagnosis Date     Acanthosis nigricans      Accessory skin tags      Acute Gonorrhea     Created by Conversion      Anemia      Bacterial vaginosis 5/3/2019    3/21/19     Cervical insufficiency during pregnancy in second trimester, antepartum 5/3/2019     Dichorionic diamniotic twin pregnancy in second trimester 5/3/2019     Diet controlled gestational diabetes mellitus (GDM) in second trimester 2019     Heartburn      Hirsutism      History of  delivery, currently pregnant in first trimester 2019     Low back pain      Menorrhagia       labor 2/3/2011     Rotator Cuff Tendonitis     Created by Conversion        Past Surgical History:   Procedure Laterality Date     TONSILLECTOMY         CURRENT MEDS:  Current Outpatient Medications   Medication Sig Dispense Refill     albuterol (PROAIR HFA) 90 mcg/actuation inhaler Inhale 1-2 puffs every 4 (four) hours as needed for wheezing. 1 Inhaler 1     azithromycin (ZITHROMAX) 500 MG tablet Take 2 tablets by mouth once. 2 tablet 0     phentermine (ADIPEX-P) 37.5 mg tablet Take 1/2 to 1 tablet in the morning. 90 tablet 1     PRENATAL VITAMIN 27 mg iron- 0.8 mg Tab tablet Take 1 tablet by mouth daily.       promethazine (PHENERGAN) 25 MG tablet Take 25 mg by mouth.       No current facility-administered medications for this visit.          Family History   Problem Relation Age  "of Onset     Hypertension Mother      Asthma Mother      Obesity Sister      Diabetes Paternal Aunt      Obesity Paternal Aunt      Diabetes Paternal Grandfather      Obesity Maternal Aunt      Arthritis Maternal Grandmother      Stroke Paternal Grandmother      Arthritis Paternal Grandmother      Hypertension Son         reports that she has never smoked. She has never used smokeless tobacco. She reports that she does not drink alcohol or use drugs.    Review of Systems -  A complete ROS was reviewed and except for what is listed in the HPI above, all others are negative  PSYCHIATRIC: She has undergone a lifestyle assessment and has been deemed a good candidate for bariatric surgery by the psychologist.    Ht 5' 3.25\" (1.607 m)   Wt (!) 245 lb (111.1 kg)   BMI 43.06 kg/m    Wt Readings from Last 3 Encounters:   09/21/20 (!) 245 lb (111.1 kg)   09/11/20 (!) 241 lb (109.3 kg)   08/21/20 (!) 250 lb (113.4 kg)     Body mass index is 43.06 kg/m .    EXAM:  GENERAL: This is a well-developed 30 y.o. female who appears her stated age  HEAD & NECK: Grossly normal.  No visible goiter or scars  CARDIAC: Unable to assess given video visit  CHEST/LUNG: Normal respiratory effort  ABDOMEN: Obese.  No visible hernias or masses appreciated.  LYMPHATIC:  No significant adenopathy visualized.    EXTREMITIES: Grossly normal.  No evidence of chronic venous stasis.    NEUROLOGIC: Focally intact  INTEGUMENT: No open lesions or ulcers appreciated visually  PSYCHIATRIC: Normal affect. She has a good grasp on the nature of her obesity and the treatment options.    LABS:  Lab Results   Component Value Date    WBC 7.8 04/30/2020    HGB 12.2 04/30/2020    HCT 37.7 04/30/2020    MCV 73 (L) 04/30/2020     04/30/2020     INR/Prothrombin Time      Lab Results   Component Value Date    HGBA1C 5.1 04/30/2020     Lab Results   Component Value Date    ALT 14 04/30/2020    AST 16 04/30/2020    ALKPHOS 61 04/30/2020    BILITOT 0.3 04/30/2020 "       Assessment/Plan: 30 y.o. female who is an excellent candidate for bariatric and metabolic surgery.  After a careful conversation with the patient it was decided that a laparoscopic sleeve gastrectomy would be her best option.     I went over the surgery in detail with her.  I went over the nature of the operation and some of the potential consequences of the surgery.  I went over the expected hospital course and discussed laparoscopic versus open surgery, understanding that we will plan on doing this laparoscopically with the possibility of having to convert to an open operation.  I went over some of the risks and complications of the operation including, but not limited to, DVT, pulmonary emboli, pneumonia, postoperative bleeding, wound infection, staple line leak, intra-abdominal sepsis, and possible death.  I also went over some of the potential nutritional concerns such as vitamin B-12, iron, vitamin D, vitamin A, calcium and protein deficiencies.  I will also went over the need for lifelong nutritional surveillance.  The patient understands and wants to proceed with surgery.  We will submit for prior authorization.      Homar Richey MD  [facility] Department of Surgery      Video-Visit Details    Type of service:  Video Visit    Video End Time (time video stopped): 1400  Originating Location (pt. Location): Home    Distant Location (provider location):  [facility] GENERAL SURGERY AND BARIATRICS CARE     Platform used for Video Visit: Bob Philippe CMA

## 2021-06-11 NOTE — PROGRESS NOTES
I have May scheduled for her LSG with Dr.. Richey on 11/03/20 and pre op class on 10/14/20  She has scheduled her pre op and testing to be at [facility] for 10/15/20    [facility] [place] Auth# [id number]

## 2021-06-11 NOTE — PROGRESS NOTES
I have submitted a prior authorization request on behalf of this patient to ROHAN DAWSON for approval of LSG with Dr. Homar Richey

## 2021-06-11 NOTE — PROGRESS NOTES
Non-surgical Weight Loss Follow Up Diet Evaluation    Assessment:  This patient is a 27 y.o. female is being seen today for follow-up non-surgical nutritional evaluation. Today we reviewed the patients current eating habits and level of physical activity, and instructed on the changes that are required for successful weight loss outcomes.    Phentermine: Pt doesn't have any left.     Pt's Initial Weight: 266.5 lbs  Weight: 263 lb 8 oz (119.5 kg)  Weight loss from initial: 3  % Weight loss: 1.13 %  BMI: Body mass index is 45.23 kg/(m^2).  IBW: 120 lbs    Personal goal weight: not discussed during visit     Estimated RMR ([place]-St Jeor equation): 1915 calories  Protein requirements (.5grams to .9grams per pound IBW, 20-30% of calories, minimum of 60-80gm per day):   grams    Progress made since last visit: Pt reports being stressed with eating healthy on a budget. Pt reports struggling eating healthy at home versus when at work. Pt reports going to fast food on the weeknd.   Concerns: snacking between meals, frequent fast food intake, low protein intake.       Diet Recall/Time:   Breakfast: banana, strawberry pre-made smoothie in dairy section of grocery store, banana (5g)  Am Snack: cheese it crackers or star crunch cake   Lunch: 2 corn dogs, small bag of chips, water (Pt reports this is what she can afford at this time)- Pt works at [employer]; buying meal there. (24g)  Pm snack: none   Dinner: fast food (10g)   HS Snack: cookies or ice cream     Per Diet recall estimated protein: 30-40 grams/day     Typical Snacks: convenience foods   Meals per week away from home: 5x/week      Recommended limiting eating out to no more than 2x/week.  Patient and I reviewed the importance of eating three consistent meals per day; as well as meal timing to be spaced 4-5 hours apart.  Snack choices: 100-150 calories (1-2x/day if physically hungry), incorporating a fruit/vegetable w/ protein source.    Meal Duration: Pt not  keeping track    Portion Sizes problematic? YES per patient/diet recall  Encouraged slowing meal times down, 20-30 minutes, chewing to applesauce consistency.   To aid in proper portion control and slow meal time down discussed consuming meals off smaller plates, use toddler/children utensils and set utensils down after each bite.    Protein, vegetables/fruits, carbohydrates:   The patient and I discussed the importance of including lean/low fat protein at each meal and limiting carbohydrate intake to less than 25% of plate volume.       Vitamins/Mineral Supplementation: vitamin D    Beverages (Type/Oz. per day)  Water: 64 oz   Coffee: none   Tea: none   Milk: none   Regular soda: w/ fastfood   Diet soda: none   Juice: none   Ta-Aid/lemonade/etc: none   Alcohol: none     Discussed the importance of adequate hydration and the goal of 64+ oz of fluid daily.   The patient understands the importance of  avoiding all sweetened and alcoholic drinks, and instead choosing 64 oz plain water.    Exercise  Nothing routine established.     Pt's understands that 45-60 minutes of daily activity is an important part of weight loss success.   Encouraged pt to incorporate  strength training exercise in addition to cardiovascular exercise most days of the week.    PES statement:     1. (NC-3.3.5) Obese, class III, BMI ?40 related to Food and nutrition related knowledge deficit as evidenced by Overconsumption of high fat and or calorie dense food or beverage; Large portions; Estimated excessive energy intake; Inability to apply nutrition related recommendations.       Intervention:  Discussion:  1. Educated pt on Eat Better, Move More, Live Well: Non-surgical Weight Loss Handout  2. Reviewed lean protein sources.  20gm protein at 3 meals daily. 60-80 grams daily total.  3. Educated pt on food labels: keeping total fat grams <10, total sugar grams <10, fiber >3gm per serving.  4. Suggested protein supplements patient can use to  replace breakfast shake   5. Discussed healthy meals pt can buy at [employer] on lunch break   6. Plate Method: The patient and I discussed the importance of including lean/low  fat protein at each meal and limiting carbohydrate intake to less  than 25% of plate volume.    Instructions/Goals:   1. Include 20 gm protein at each meal.  2. Increase vegetable/fruit intake, by having a vegetable or fruit with each meal daily. Recommended pt to increase vegetable/fruit intake to 4-5 servings daily.  3. Increase fluid intake to 64oz daily: choose plain or calorie/alcohol-free beverages.  4. Incorporate daily structured activity, 45-60 minutes most days of the week  5. Read food labels more consistently: keeping total fat grams <10, total sugar grams <10, fiber >3gm per serving.   6. Practice plate method: 1/2 plate lean/low fat protein source, vegetable/fruit, <25% of plate complex carbohydrates.      Handouts Provided:  Eat Better, Move More, Live Well: Non-surgical Weight Loss Handout  List of protein supplements      Monitor/Evaluation:    Pt will f/u in one month with bariatrician and RD     Plan for next visit with RD:    Review plate method and food journal homework.  Educated pt on food labels  Review carbohydrates/fiber  Exercise      Time In: 11:00am  Time Out: 11:30am      ABN signed: Yes

## 2021-06-12 NOTE — TELEPHONE ENCOUNTER
Post-Op Phone Call  [facility]    Surgeon: Homar Richey M.D.  Date of Surgery: 10/27/2020  Discharge Date: 10/28/2020    Date/Time Called:   Date: 10/30/2020 Time: 4:05 PM   Attempt: First    Patient unavailable, message left to call [facility] with questions/problems? No    Pain Control:  Intensity: Mild (1 - 3)  Duration/Location/Explain: Incisional pain  What makes it better/worse? Moving around, comes and goes.    Medications:  Narcotic Use - No  Drug type: Gabapentin  Frequency: every 8 hours    Non-prescription pain control: Tylenol every 6 hours.    Other medications currently taking: see med list    Complete Multivitamin + Iron BID? Yes    Vitamin B12? sublingual daily    Incisions:  Drainage? clean and dry  Comment: steri strips on.    Intake/Output:  Fluid Intake(oz/day)? 48 oz so far today.  Fluid type? water    Heartburn? No  Counseling: Omeprazole daily.  Nausea? No  Vomiting? No  Explain:     Voiding Frequency? 4 or more/day     Voiding-Color/Amount? Good.    Flatus? Yes    Bowel Movement?Yes     Are you using your incentive spirometer? If yes, how often? 3+/day    Any fever type symptoms? No  Explain:     Walking activity?   Frequency/Type: up and around the house.    In Preparation for Surgery:  On a scale of 1-5, with 5 being the highest, how well did the pre-op class prepare you for what actually happened in the hospital? 5  If you were unable to give us a 5, what could we have done to earn a 5?     Is there anything that you wish you would have known prior to surgery that you did not know? If yes, what? No.    On a scale of 1-5, with 5 being the highest, how was the service while you were in the hospital? 5  If you were unable to give us a 5, what could we have done to earn a 5?     Is/was there anyone in particular; nurse, aide, hospital staff, that did a great job and you would like us to recognize? If yes, whomFer Jarrett in  on [phone] was amazing and  then JONA Chi.   What did they do? They were great!    Would you recommend [facility] to others? Yes  If no, can you explain why?     Would you recommend [facility] to others?Yes  If no, can you explain why?      Thank you for your time. Please do not hesitate to call us with any questions or concerns.    Call completed by:   Arti Brown RN, CBN  [facility]  P [phone]  F [phone]

## 2021-06-12 NOTE — PROGRESS NOTES
"  Subjective:     Russell Gomez is a 27 y.o. female who presents for an annual exam.     Other concerns today:  1.  Intermittent asthma.  She needs refill on her albuterol inhaler.  She feels that overall her asthma is well controlled.  However, she has had a cold and been sick recently.  Therefore, she has been using her inhaler more than normal.  At times she has been coughing up mucus and then gasping for air.    2.  Vitamin D deficiency.  She needs refill on her vitamin D.    3.  Contraception and pregnancy planning.  She has 1 child.  History of 2 elective abortions.  She is thinking that she wants to have a baby.  She says she has \"baby fever.\"  However her fiancé is not interested in having a baby right now.  He says that their finances are not a good place.  She does not want her children to be spaced too far apart.  She would rather have her second child now and then get her tubes tied.  She says recently the condom has broken a few times when they have had sex, and she has been excited because she could get pregnant by accident.  However she is not pregnant.  She gets her regular monthly period.  No problems with menorrhagia.    4.  Morbid obesity.  She is following with bariatric center.  She is prescribed Vyvanse,naltrexone, and phentermine.  She is not taking any of these right now.  She did meet with her dietitian last week.  However she has not been into see her doctor, so she has run out of these prescriptions.  She notes it is hard to exercise because she has to have someone watch her son while she exercises.  She tried this at [employer].  However, her son's behavior was too much for them to handle there.  She is hoping to get membership at the [employer] instead.  3 most recent office notes reviewed today.    5.  Depression.  She has Wellbutrin and Lexapro on her med list.  She says she is not taking these because they made her too sleepy and actually made her symptoms of depression worse.  She has " not followed up with her prescribing physician to address this.    Immunization History   Administered Date(s) Administered     DTP 1990, 1990, 1990, 09/27/1991, 05/27/1994     Hep B, Peds, Historic 07/19/1996, 06/12/1997, 07/16/1999, 10/24/2000     HiB, historic 1990, 03/28/1991     IPV 1990, 1990, 09/27/1991, 05/27/1994     Influenza, inj, historic 09/15/2016     MMR 07/15/1991, 06/12/1997, 11/13/1998     Td, historic 08/08/2002, 08/18/2012     Varicella 07/16/1999     Gynecologic History  Patient's last menstrual period was 06/29/2017 (approximate).  Contraception: none  Last Pap: 2016 normal  Last mammogram: n/a      Current Outpatient Prescriptions:      albuterol (PROAIR HFA) 90 mcg/actuation inhaler, Inhale 1-2 puffs every 4 (four) hours as needed for wheezing., Disp: 1 Inhaler, Rfl: 1     buPROPion (WELLBUTRIN SR) 150 MG 12 hr tablet, Take 1 tablet (150 mg total) by mouth 2 (two) times a day., Disp: 180 tablet, Rfl: prn     cholecalciferol, vitamin D3, 2,000 unit Tab, Take 1 tablet by mouth daily., Disp: , Rfl:      escitalopram oxalate (LEXAPRO) 20 MG tablet, , Disp: , Rfl: 0     lisdexamfetamine (VYVANSE) 30 MG capsule, Take 1 capsule (30 mg total) by mouth every morning., Disp: 30 capsule, Rfl: 0     naltrexone (DEPADE) 50 mg tablet, Take 0.5 tablets (25 mg total) by mouth 2 (two) times a day., Disp: 90 tablet, Rfl: prn     phentermine (ADIPEX-P) 37.5 mg tablet, Take 1/2 to 1 tablet in the morning., Disp: 90 tablet, Rfl: 1  Past Medical History:   Diagnosis Date     Acanthosis nigricans      Accessory skin tags      Acute Gonorrhea     Created by Conversion      Anemia      Asthma      Depression      Fatigue      Heartburn      Hirsutism      Low back pain      Menorrhagia      Morbid obesity with BMI of 45.0-49.9, adult      Rotator Cuff Tendonitis     Created by Conversion      Vitamin D deficiency      Past Surgical History:   Procedure Laterality Date     NC  REMOVAL OF TONSILS,<13 Y/O      Description: Tonsillectomy;  Recorded: 12/09/2010;     Penicillins  Family History   Problem Relation Age of Onset     Hypertension Mother      Asthma Mother      Obesity Sister      Diabetes Paternal Aunt      Obesity Paternal Aunt      Diabetes Paternal Grandfather      Obesity Maternal Aunt      Arthritis Maternal Grandmother      Stroke Paternal Grandmother      Arthritis Paternal Grandmother      Social History     Social History     Marital status: Single     Spouse name: N/A     Number of children: N/A     Years of education: N/A     Occupational History     Not on file.     Social History Main Topics     Smoking status: Never Smoker     Smokeless tobacco: Never Used     Alcohol use No     Drug use: No     Sexual activity: Yes     Partners: Male     Birth control/ protection: Condom     Other Topics Concern     Not on file     Social History Narrative    Single, one son, Son's father is in [place] and not involved    Boyfriend is supportive    Close with her father who lives in [place]    Not close with her mother who lives here            Review of Systems  Complete Review of Systems is discussed with patient and is negative except as noted in HPI.    Objective:     Vitals:    07/21/17 1315   BP: 100/78   Pulse: 76   Resp: 20   Temp: 97.1  F (36.2  C)     Body mass index is 46.75 kg/(m^2).    Physical Exam:  General: Patient is alert and Oriented x 3, in no apparent distress.  Appropriately groomed, normal affect.  HEENT, Thyroid, Lymphatic, Cardiac, Pulmonary, GI, Musculoskeletal, and Neuro exams were completed today and grossly normal.  Breast Exam: No lumps, skin changes, lymphadenopathy, or nipple discharge noted bilaterally.  Genitourinary Exam: External genitalia is normal in appearance, vaginal walls are healthy and without lesions, no significant discharge noted in the vaginal vault, cervix is well visualized from 9:00 to 3:00 and normal in appearance.  Pap was taken  without difficulty.    Results for orders placed or performed in visit on 07/21/17   Wet Prep, Vaginal   Result Value Ref Range    Yeast Result No yeast seen No yeast seen    Trichomonas No Trichomonas seen No Trichomonas seen    Clue Cells, Wet Prep No Clue cells seen No Clue cells seen   Glycosylated Hemoglobin A1c   Result Value Ref Range    Hemoglobin A1c 5.6 3.5 - 6.0 %   Hemoglobin   Result Value Ref Range    Hemoglobin 11.3 (L) 12.0 - 16.0 g/dL   other labs pending    Assessment and Plan:     1. Physical Exam.  Health Maintenance discussed with patient as appropriate for age and risk factors.  Screening Pap completed today.  Other screening lab work completed.  She is not fasting.  Cholesterol check in 2016 was normal.    2. STD screening.  Patient will be informed of test results when available.    3. Contraception/Pregnancy Planning.  I reviewed general pros and cons to becoming pregnant at this time.  I discussed with her that it would be best if she could achieve a normal BMI prior to becoming pregnant.  I reviewed pregnancy risks with morbid obesity.  I also reviewed with her that several of the medicines she is taking for weight loss would not be advised if she was planning pregnancy.    I recommended she discuss possibility of planning pregnancy soon with her partner.  If she does decide to try for pregnancy soon, I asked her to inform her bariatric group.  They may need to adjust some nutrition guidelines for her.  I also recommended she start a prenatal vitamin if she wants to become pregnant.  If she would like birth control, she will return to return to clinic for follow-up.    4. Morbid Obesity.  She is following with bariatric surgery.  She is not taking any of the medicines they prescribed.  She is planning on following up with her doctor soon.    5. Intermittent Asthma.  Appears well-controlled on present medicines.  Prescription refilled.    6. Vitamin D Insufficiency.  Lab pending today.   Prescription refilled.    7. Depression.  She will be contacting her prescribing mental health provider for follow-up.  She says that Wellbutrin and Lexapro made her symptoms worse and made her sleepy.    The following high BMI interventions were performed this visit: dietary needs education Following with bariatric center.  The following are part of a depression follow up plan for the patient:  under care of mental health counselor      This dictation uses voice recognition software, which may contain typographical errors.

## 2021-06-12 NOTE — PROGRESS NOTES
[address] [address]  Dept: [phone]  Dept Fax: [phone]  Primary Provider: Rito Mills MD    PREOPERATIVE EVALUATION:  Today's date: 10/15/2020    Russell Gomez is a 30 y.o. female who presents for a preoperative evaluation.    Surgical Information:  Surgery Details 10/11/2020   Surgery/Procedure: Gastrectomy sleeve laparoscopic   Surgery Location: [facility]   Surgeon: Doctor JORGE Dey   Surgery Date: 11/10/2020   Time of Surgery: 12:00pm   Where patient plans to recover: at home with family     Fax number for surgical facility: Note does not need to be faxed, will be available electronically in Epic.  Type of Anesthesia Anticipated: General    Subjective     HPI related to upcoming procedure:   Preop Questions 10/11/2020   Have you ever had a heart attack or stroke? No   Have you ever had surgery on your heart or blood vessels, such as a stent placement, a coronary artery bypass, or surgery on an artery in your head, neck, heart, or legs? No   Do you have chest pain with activity? No   Do you have a history of  heart failure? No   Do you currently have a cold, bronchitis or symptoms of other infection? No   Do you have a cough, shortness of breath, or wheezing? No   Do you or anyone in your family have previous history of blood clots? No   Do you or does anyone in your family have a serious bleeding problem such as prolonged bleeding following surgeries or cuts? No   Have you ever had problems with anemia or been told to take iron pills? No   Have you had any abnormal blood loss such as black, tarry or bloody stools, or abnormal vaginal bleeding? No   Have you ever had a blood transfusion? No   Are you willing to have a blood transfusion if it is medically needed before, during, or after your surgery? NO:    Have you or any of your relatives ever had problems with anesthesia? No   Do you have sleep apnea, excessive snoring or daytime  drowsiness? No   Do you have any artifical heart valves or other implanted medical devices like a pacemaker, defibrillator, or continuous glucose monitor? No   Do you have artificial joints? No   Are you allergic to latex? No   Is there any chance that you may be pregnant? No     Review of Systems  Complete review of systems is discussed with patient and is negative except as noted above.    Patient Active Problem List    Diagnosis Date Noted     Chlamydia infection 2020     Rh negative state in antepartum period, second trimester 2019     Pre-syncope 2019     Vitamin D deficiency      Low back pain      Episode of recurrent major depressive disorder (H)      Anemia      Acanthosis nigricans      Hirsutism      Abnormal Pap smear of cervix 2016     Adjustment disorder with depressed mood 2016     Herpes Simplex      Morbid obesity (H)      Intermittent asthma      Past Medical History:   Diagnosis Date     Acanthosis nigricans      Accessory skin tags      Acute Gonorrhea     Created by Conversion      Anemia      Bacterial vaginosis 5/3/2019    3/21/19     Cervical insufficiency during pregnancy in second trimester, antepartum 5/3/2019     Dichorionic diamniotic twin pregnancy in second trimester 5/3/2019     Diet controlled gestational diabetes mellitus (GDM) in second trimester 2019     Heartburn      Hirsutism      History of  delivery, currently pregnant in first trimester 2019     Low back pain      Menorrhagia       labor 2/3/2011     Rotator Cuff Tendonitis     Created by Conversion      Past Surgical History:   Procedure Laterality Date     TONSILLECTOMY       Current Outpatient Medications   Medication Sig Dispense Refill     phentermine (ADIPEX-P) 37.5 mg tablet Take 1/2 to 1 tablet in the morning. 90 tablet 1     albuterol (PROAIR HFA) 90 mcg/actuation inhaler Inhale 1-2 puffs every 4 (four) hours as needed for wheezing. 1 Inhaler 1     cyanocobalamin,  "vitamin B-12, 1,000 mcg Subl Place 1,000 mcg under the tongue daily.       [START ON 11/11/2020] omeprazole (PRILOSEC) 20 MG capsule Take 1 capsule (20 mg total) by mouth daily before breakfast. For the first 6 weeks, open capsule & sprinkle contents into liquids or onto food. 90 capsule 0     pediatric multivitamin (FLINTSTONES) Chew chewable tablet Chew 1 tablet 2 (two) times a day.       PRENATAL VITAMIN 27 mg iron- 0.8 mg Tab tablet Take 1 tablet by mouth once daily 90 tablet 3     promethazine (PHENERGAN) 25 MG tablet Take 25 mg by mouth.       [START ON 11/24/2020] ursodioL (ACTIGALL) 300 mg capsule Take 1 capsule (300 mg total) by mouth 2 (two) times a day. Start 2 wks after surgery. Do not cut, crush or open. Take with warm liquids. 180 capsule 1     No current facility-administered medications for this visit.        Allergies   Allergen Reactions     Blood-Group Specific Substance      Patient has probable passive anti-D. Blood product orders may be delayed. Draw one red top and two purple top tubes for all Type and Screen/Type and Crossmatch orders.     Penicillins Swelling       Social History     Tobacco Use     Smoking status: Never Smoker     Smokeless tobacco: Never Used   Substance Use Topics     Alcohol use: No      Social History     Substance and Sexual Activity   Drug Use No           Objective   /74 (Patient Site: Left Arm, Patient Position: Sitting, Cuff Size: Adult Large)   Pulse 81   Ht 5' 4.5\" (1.638 m)   Wt (!) 248 lb (112.5 kg)   LMP 09/26/2020 (Exact Date)   BMI 41.91 kg/m    Physical Exam  Vital signs reviewed  General:  Patient is alert and oriented ×3, in no apparent distress  Head:  Normocephalic, without obvious abnormality, atraumatic  Eyes:  PERRLA laterally, conjunctiva clear, EOMs intact bilaterally  Ears:  Normal tympanic membranes and external ear canals  Throat:  No erythema, edema, or exudate  Neck:  No adenopathy, normal ROM  Cardiac:  Regular rate and rhythm, " normal S1 and S2, no murmur, rub, or gallop  Lungs:  Clear to auscultation bilaterally, no crackles, rales, rhonchi, or wheezing noted, respirations unlabored  Abdomen:  Soft, nontender, normal bowel sounds, no masses, no organomegaly  Musculoskeletal:  Extremities normal, atraumatic, normal range of motion of all extremities, normal range of motion of back, patient walks a normal tandem gait  Skin:  Skin color, texture, turgor, normal, no rashes or lesions noted on exposed skin  Lymph nodes:  No cervical lymph nodes normal bilaterally  Neurologic:  Cranial nerves II through XII grossly intact, DTRs normal and symmetric bilaterally in upper and lower extremities      PRE-OP Diagnostics:   Recent Results (from the past 168 hour(s))   Pregnancy (Beta-hCG, Qual), Urine   Result Value Ref Range    Pregnancy Test, Urine Negative Negative   Electrocardiogram Perform and Read   Result Value Ref Range    SYSTOLIC BLOOD PRESSURE      DIASTOLIC BLOOD PRESSURE      VENTRICULAR RATE 84 BPM    ATRIAL RATE 84 BPM    P-R INTERVAL 174 ms    QRS DURATION 72 ms    Q-T INTERVAL 368 ms    QTC CALCULATION (BEZET) 434 ms    P Axis 56 degrees    R AXIS 64 degrees    T AXIS 33 degrees    MUSE DIAGNOSIS       Normal sinus rhythm  Normal ECG  When compared with ECG of 25-APR-2019 11:51,  prior ECG had bad lead placement  Confirmed by NATI LOBO MD LOC: ([id number]) on 10/16/2020 8:56:00 AM     INR   Result Value Ref Range    INR 0.98 0.90 - 1.10   APTT(PTT)   Result Value Ref Range    PTT 28 24 - 37 seconds   Comprehensive Metabolic Panel   Result Value Ref Range    Sodium 139 136 - 145 mmol/L    Potassium 4.4 3.5 - 5.0 mmol/L    Chloride 105 98 - 107 mmol/L    CO2 25 22 - 31 mmol/L    Anion Gap, Calculation 9 5 - 18 mmol/L    Glucose 78 70 - 125 mg/dL    BUN 12 8 - 22 mg/dL    Creatinine 0.73 0.60 - 1.10 mg/dL    GFR MDRD Af Amer >60 >60 mL/min/1.73m2    GFR MDRD Non Af Amer >60 >60 mL/min/1.73m2    Bilirubin, Total 0.4 0.0 - 1.0 mg/dL     Calcium 9.3 8.5 - 10.5 mg/dL    Protein, Total 7.2 6.0 - 8.0 g/dL    Albumin 3.8 3.5 - 5.0 g/dL    Alkaline Phosphatase 66 45 - 120 U/L    AST 16 0 - 40 U/L    ALT 15 0 - 45 U/L   HM1 (CBC with Diff)   Result Value Ref Range    WBC 7.9 4.0 - 11.0 thou/uL    RBC 5.45 (H) 3.80 - 5.40 mill/uL    Hemoglobin 12.5 12.0 - 16.0 g/dL    Hematocrit 39.6 35.0 - 47.0 %    MCV 73 (L) 80 - 100 fL    MCH 22.9 (L) 27.0 - 34.0 pg    MCHC 31.4 (L) 32.0 - 36.0 g/dL    RDW 14.8 (H) 11.0 - 14.5 %    Platelets 246 140 - 440 thou/uL    MPV 8.7 7.0 - 10.0 fL    Neutrophils % 42 (L) 50 - 70 %    Lymphocytes % 40 20 - 40 %    Monocytes % 11 (H) 2 - 10 %    Eosinophils % 6 0 - 6 %    Basophils % 1 0 - 2 %    Neutrophils Absolute 3.3 2.0 - 7.7 thou/uL    Lymphocytes Absolute 3.2 0.8 - 4.4 thou/uL    Monocytes Absolute 0.9 0.0 - 0.9 thou/uL    Eosinophils Absolute 0.5 (H) 0.0 - 0.4 thou/uL    Basophils Absolute 0.1 0.0 - 0.2 thou/uL   Urinalysis-UC if Indicated   Result Value Ref Range    Color, UA Yellow Colorless, Yellow, Straw, Light Yellow    Clarity, UA Clear Clear    Glucose, UA Negative Negative    Bilirubin, UA Negative Negative    Ketones, UA Negative Negative    Specific Gravity, UA 1.010 1.005 - 1.030    Blood, UA Negative Negative    pH, UA 7.0 5.0 - 8.0    Protein, UA Negative Negative mg/dL    Urobilinogen, UA 0.2 E.U./dL 0.2 E.U./dL, 1.0 E.U./dL    Nitrite, UA Negative Negative    Leukocytes, UA Negative Negative   I personally reviewed EKG, normal, no arrhythmias.  9}     Assessment & Plan       The proposed surgical procedure is considered INTERMEDIATE risk.    REVISED CARDIAC RISK INDEX   The patient has the following serious cardiovascular risks for perioperative complications:  No serious cardiac risks = 0 points    INTERPRETATION: 0 points: Class I (very low risk - 0.4% complication rate)    RECOMMENDATION:  APPROVAL GIVEN to proceed with proposed procedure, without further diagnostic evaluation.    1. Pre-op exam  2.  Morbid obesity (H)  Patient scheduled to have gastric weight loss surgery.  - Electrocardiogram Perform and Read  - Pregnancy (Beta-hCG, Qual), Urine  - XR Chest 2 Views; Future  - INR  - APTT(PTT)  - Urinalysis-UC if Indicated  - Comprehensive Metabolic Panel  - HM1(CBC and Differential)    3. Intermittent Asthma  Well-controlled on present medication.  She will bring her inhaler with her to surgery.    4. History of anemia.  Hemoglobin normal today.      Signed Electronically by: Gail Glasgow PA-C    Copy of this evaluation report is provided to requesting physician.    [facility] Pre[facility] Guidelines    Revised Cardiac Risk Index

## 2021-06-12 NOTE — PROGRESS NOTES
FMLA forms completed and signed on my behalf due to Dr. Richey's absence this week and urgency in completing forms for patient.    Forms sent to Ra[employer]: [phone]    Forms sent to HIM for scanning.    Trever Philippe [facility]  P: [phone] I F: [phone]

## 2021-06-12 NOTE — PATIENT INSTRUCTIONS - HE
Medication Instructions:     Acetaminophen (Brand Name: Tylenol or MPAP)       You will likely be sent home on Tylenol to go with your other pain medications after surgery.  It is ok to cut/crush regular or extra strength tablets.  Make sure tablet is not long acting or extended release.  Do not take more than 3000mg in 24 hours.  If you decide to use liquid Tylenol at home, we recommend you use Adult strength because you will have to take less liquid to get the same effect but it can be harder to find in the stores and might have an easier time ordering it online prior to your surgery.  Dilute the medication 1:1 with water before taking the liquid version to prevent dumping or stomach upset.     Albuterol Inhaler or Nebulizer (Brand Name: Pro-Air Inhaler/Ventolin)    Ok to use.  You are encouraged to bring to the hospital on the day of surgery.      Multivitamin with Iron   If not already taking, start chewable MVI with at least 18mg of iron and 10-15 mg of zinc twice a day at least 2 weeks prior to surgery and resume the day after surgery for life. Do not take the morning of surgery.     Omeprazole (Brand Name: Prilosec)   If not already taking, you will get a prescription to start when you get home from the hospital.  Tablets cannot be cut or crushed.  If a capsule, entire capsule contents may be sprinkled on a spoonful of applesauce and taken immediately without chewing.  If only on a full liquid diet, dump capsule contents into a spoonful of liquid and take without chewing.  May swallow whole again starting 6 weeks after surgery but can try swallowing sooner if having issues with opening into food.  Continue after surgery for at least 3 months.     Phentermine (Brand Name: Adipex)    Stop taking 2 weeks before surgery.  Talk with your Bariatrician prior to restarting this medication after surgery.      Promethazine (Phenergan)   This is available as a solution, syrup or tab; okay to cut or crush.      Ursodiol  (Brand name: Actigall)  Start two weeks after surgery.  Swallow whole with warm water, do not cut, crush, or open capsule up.  This is a medication that will help to prevent gallstones from forming during the first 6 months post-op of rapid weight loss.  Prescription was sent to your pharmacy.     Vitamin B12   If not already taking, start daily sublingual (under the tongue) vitamin B12 1,000 mcg, Nascobal (nasal) vitamin B12 500 mcg every 7 days or monthly vitamin B12 injections at least 2 weeks prior to surgery and resume the day after surgery for life. Do not take the morning of surgery.      Medications After Surgery     Medication Dose When to Start   Vitamin B12  1000 mcg Once a day under the tongue (sublingual), weekly nasal spray or monthly injections Day after surgery   Chewable Multivitamin with Iron  (Must also contain Vitamin A and Zinc) 1 tablet twice daily Day after surgery   Omeprazole  20 mg 1 capsule daily - open and sprinkle on food or swallow with fluid Day after surgery   Actigall (Ursodiol)- 300 mg for gallbladder if you still have Twice daily - swallow whole with warm water Two weeks after surgery   Chewable Calcium Citrate Twice daily Three weeks after surgery   Vitamin D - (125 mcg)  5000 units Once daily Three weeks after surgery

## 2021-06-12 NOTE — ANESTHESIA PREPROCEDURE EVALUATION
Anesthesia Evaluation      Patient summary reviewed   No history of anesthetic complications     Airway   Mallampati: II  Neck ROM: full   Pulmonary - negative ROS    breath sounds clear to auscultation  (+) asthma    (-) sleep apnea                         Cardiovascular - negative ROS  Exercise tolerance: > or = 4 METS  Rhythm: regular  Rate: normal,         Neuro/Psych    (-) no seizures, no CVA    Endo/Other - negative ROS   (+) diabetes mellitus, obesity (BMI 40),   (-) hypothyroidism, not pregnant     GI/Hepatic/Renal - negative ROS   (-) GERD     Other findings:   COVID negative 10/24/20    Hbg 12.5  Plt 246  INR 0.98      Dental      Comment: Fair dentition, no loose or removable teeth                       Anesthesia Plan  Planned anesthetic: general endotracheal and ITN  Acetaminophen 1 g, Mg 4 g, Precedex gtt, ITN (duramorph 150 mcg), ketamine on induction  Scopolamine, dexamethasone 10 mg, ondansetron 4 mg   ASA 3   Induction: intravenous   Anesthetic plan and risks discussed with: patient  Anesthesia plan special considerations: antiemetics, dexmedetomidine  Post-op plan: routine recovery

## 2021-06-12 NOTE — PROGRESS NOTES
Patient attended virtual group class to review pre-op instructions for upcoming surgery.    Discussed admission process, COVID restrictions and hospital course.  Pharmacy information packet given and explained. Patient was given exercises to work on post-op for maintaining muscle mass and strengthening.  Bariatric quiz given to pt for review on their own. Discharge instructions, information card and follow up appointments given and reviewed with pt at this time and patient verbalized understanding. She will have her H&P at [facility] on 10/15/2020 with Gail Glasgow PA-C and do her pre-op testing at that visit.  Prescriptions for Omeprazole and Actigall sent to the patient's pharmacy to be started after surgery.      Arti Brown RN, CBN  [facility]  P [phone]  F [phone]

## 2021-06-12 NOTE — ANESTHESIA PROCEDURE NOTES
Spinal Block    Patient location during procedure: OR  Start time: 10/27/2020 1:59 PM  End time: 10/27/2020 2:05 PM  Reason for block: at surgeon's request and post-op pain management    Staffing:  Performing  Anesthesiologist: Raven Hernandez MD    Preanesthetic Checklist  Completed: patient identified, risks, benefits, and alternatives discussed, timeout performed, consent obtained, at patient's request, airway assessed, oxygen available, suction available, emergency drugs available and hand hygiene performed  Spinal Block  Patient position: sitting  Prep: ChloraPrep  Patient monitoring: heart rate, cardiac monitor, continuous pulse ox and blood pressure  Approach: midline  Location: L3-4  Injection technique: single-shot  Needle type: pencil-tip   Needle gauge: 24 G      Additional Notes:  Successful CSF on 3rd pass. No significant paresthesias. No pain on injection of intrathecal medication.

## 2021-06-12 NOTE — ANESTHESIA CARE TRANSFER NOTE
Last vitals:   Vitals:    10/27/20 1510   BP: 123/60   Pulse: 85   Resp: 16   Temp: 36.4  C (97.6  F)   SpO2: 100%     Patient's level of consciousness is drowsy  Spontaneous respirations: yes  Maintains airway independently: yes  Dentition unchanged: yes  Oropharynx: oropharynx clear of all foreign objects    QCDR Measures:  ASA# 20 - Surgical Safety Checklist: WHO surgical safety checklist completed prior to induction    PQRS# 430 - Adult PONV Prevention: 4558F - Pt received => 2 anti-emetic agents (different classes) preop & intraop  ASA# 8 - Peds PONV Prevention: NA - Not pediatric patient, not GA or 2 or more risk factors NOT present  PQRS# 424 - Tanya-op Temp Management: 4559F - At least one body temp DOCUMENTED => 35.5C or 95.9F within required timeframe  PQRS# 426 - PACU Transfer Protocol: - Transfer of care checklist used  ASA# 14 - Acute Post-op Pain: ASA14B - Patient did NOT experience pain >= 7 out of 10

## 2021-06-12 NOTE — PROGRESS NOTES
FMLA paperwork received. Signature pending. Forms placed in GF folder.    Leave start date: 10/27/2020    Leave end date: 11/10/2020    RTW on 11/11/2020 with No restrictions.    Trever Philippe [facility]  P: [phone] I F: [phone]

## 2021-06-12 NOTE — PROGRESS NOTES
Time in: 1:00p /Time out: 2:00p   Pt's Initial Weight: 260 lbs  Weight: (!) 245 lb (111.1 kg)  Weight loss from initial: 15  % Weight loss: 5.77 %    BMI: There is no height or weight on file to calculate BMI.    Pt presents for nutrition education class for liquid diets. Educated pt on 2-week pre-op and 1-week post-op liquid diets. Discussed appropriate liquids and demonstrated portions for each of the food groupings during each diet phase. Reviewed appropriate calories/protein/fluid goals during 2-week pre-op liquid diet. Educated on correct vitamins/minerals to take after surgery in correct dosage and frequency. Provided grocery list and sample menu plan for each diet stage, as well as unflavored protein powder samples.        Pt will begin 2-week pre-op liquid diet 10/20/20, will do clear liquids the day before surgery. Pt is scheduled for LSG on 11/03/20, and will then follow 2 week post-op liquid diet. Pt will f/u with RD 1-week post-op for further diet advancement.

## 2021-06-12 NOTE — PROGRESS NOTES
Time in: 10:00a   /Time out: 10:40a      Pt presents for 1 week post op dietitian follow up. Reports tolerating full liquids well. Denies n/v, constipation/diarrhea, or significant pain. Taking MVI and SL B12 appropriately. Taking 48 oz fluid/day and 50g protein shake. Educated pt on pureed and soft to bariatric regular diets. Provided grocery list and sample meal plan for each diet stage.  Pt will begin pureed diet on 11-11-20 and advance as tolerated to softs/bariatric regular on 12-3-20. Instructed pt to begin 500 mg calcium citrate BID and 5000IU Vitamin D3 on 12-3-20. Pt to follow up with RD at 3 months post op.

## 2021-06-12 NOTE — ANESTHESIA POSTPROCEDURE EVALUATION
Patient: Russell Gomez  Procedure(s):  GASTRECTOMY, SLEEVE, LAPAROSCOPIC  Anesthesia type: general    Patient location: PACU  Last vitals:   Vitals Value Taken Time   /59 10/27/20 1515   Temp 36.4  C (97.6  F) 10/27/20 1510   Pulse 88 10/27/20 1527   Resp 18 10/27/20 1527   SpO2 100 % 10/27/20 1527   Vitals shown include unvalidated device data.  Post vital signs: stable  Level of consciousness: awake and responds to simple questions  Post-anesthesia pain: pain controlled  Post-anesthesia nausea and vomiting: no  Pulmonary: unassisted, return to baseline  Cardiovascular: stable and blood pressure at baseline  Hydration: adequate  Anesthetic events: no    QCDR Measures:  ASA# 11 - Tanya-op Cardiac Arrest: ASA11B - Patient did NOT experience unanticipated cardiac arrest  ASA# 12 - Tanya-op Mortality Rate: ASA12B - Patient did NOT die  ASA# 13 - PACU Re-Intubation Rate: ASA13B - Patient did NOT require a new airway mgmt  ASA# 10 - Composite Anes Safety: ASA10A - No serious adverse event    Additional Notes:

## 2021-06-13 NOTE — PROGRESS NOTES
"Non-surgical Weight Loss Follow Up Diet Evaluation    Assessment:  This patient is a 27 y.o. female is being seen today for follow-up non-surgical nutritional evaluation. Today we reviewed the patients current eating habits and level of physical activity, and instructed on the changes that are required for successful weight loss outcomes.    Phentermine: no, however pt is interested in medication.     Pt's Initial Weight: 266.5 lbs  Weight: 265 lb (120.2 kg)  Weight loss from initial: 1.5  % Weight loss: 0.56 %  BMI: Body mass index is 45.49 kg/(m^2).  IBW: 120 lbs    Personal goal weight: not discussed during visit    Estimated RMR ([place]-St Jeor equation): 1915 calories  Protein requirements (.5grams to .9grams per pound IBW, 20-30% of calories, minimum of 60-80gm per day):   grams    Progress made since last visit: Pt bought kettle bell and exercise bike at home. Pt follows the \"slimfast plan\".   Concerns: low protein intake d/t relying primarily on low-protein quantity shakes. Pt also grazing throughout the day on slim fast \"snacks\" that have poor nutrient density.     Diet Recall/Time:   Breakfast: slimfast shake with orange (20g)   Am Snack: *slimfast \"snack\", such as chips or crackers.   Lunch: slimfast shake (10g)   Pm snack: *  Dinner: frozen meal or smaller portion of mac and cheese. (15g)  HS Snack: none     Per Diet recall estimated protein: 45 g/day     Meals per week away from home: none     Recommended limiting eating out to no more than 2x/week.  Patient and I reviewed the importance of eating three consistent meals per day; as well as meal timing to be spaced 4-5 hours apart.  Snack choices: 100-150 calories (1-2x/day if physically hungry), incorporating a fruit/vegetable w/ protein source.    Meal Duration: not keeping track    Portion Sizes problematic? YES per patient/diet recall  Encouraged slowing meal times down, 20-30 minutes, chewing to applesauce consistency.   To aid in proper " portion control and slow meal time down discussed consuming meals off smaller plates, use toddler/children utensils and set utensils down after each bite.    Protein, vegetables/fruits, carbohydrates:   The patient and I discussed the importance of including lean/low fat protein at each meal and limiting carbohydrate intake to less than 25% of plate volume.       Vitamins/Mineral Supplementation: vitamin D (gummy form)     Beverages (Type/Oz. per day)  Water: 60-70 oz   Coffee: none   Tea: none   Milk: none  Regular soda: none   Diet soda: none  Juice: none   Ta-Aid/lemonade/etc: none   Alcohol: none     Discussed the importance of adequate hydration and the goal of 64+ oz of fluid daily.   The patient understands the importance of  avoiding all sweetened and alcoholic drinks, and instead choosing 64 oz plain water.    Exercise  Pt uses bike and kettle bells.     Pt's understands that 45-60 minutes of daily activity is an important part of weight loss success.   Encouraged pt to incorporate  strength training exercise in addition to cardiovascular exercise most days of the week.    PES statement:     1. (NI-5.7.1) Inadequate protein intake related to Food and nutrition related knowledge deficit concerning appropriate amount of protein or Not ready for diet/lifestyle change as evidenced by Estimated intake of protein insufficient to meet requirements.      Intervention:  Discussion:  1. Educated pt on Eat Better, Move More, Live Well: Non-surgical Weight Loss Handout  2. Reviewed lean protein sources.  20gm protein at 3 meals daily. 60-80 grams daily total.  3. Encouraged pt to limit snacking. If physically hungry, encouraged pt to eat a lean protein snack (i.e.- string cheese, or non fat greek yogurt)   4. Plate Method: The patient and I discussed the importance of including lean/low  fat protein at each meal and limiting carbohydrate intake to less  than 25% of plate volume.    Instructions/Goals:   1. Include  20-30 gm protein at each meal.  2. Increase vegetable/fruit intake, by having a vegetable or fruit with each meal daily. Recommended pt to increase vegetable/fruit intake to 4-5 servings daily.  3. Increase fluid intake to 64oz daily: choose plain or calorie/alcohol-free beverages.  4. Incorporate daily structured activity, 45-60 minutes most days of the week  5. Read food labels more consistently: keeping total fat grams <10, total sugar grams <10, fiber >3gm per serving.   6. Practice plate method: 1/2 plate lean/low fat protein source, vegetable/fruit, <25% of plate complex carbohydrates.  7. Practice eating off of smaller plates/bowls, chewing to applesauce consistency, taking 20-30 minutes to eat in a calm/relaxed environment without distractions of tv/email/cell phone.    Handouts Provided:  List of protein supplements     Monitor/Evaluation:    Pt will f/u in one month with RD.     Plan for next visit with RD:    Educate on plate method  Educated pt on food labels  Review carbohydrates/fiber  Exercise      Time In: 11:30am  Time Out: 12:00pm      ABN signed: Yes

## 2021-06-13 NOTE — PROGRESS NOTES
Pt presents for 1 month post op dietitian follow up visit. Reports tolerating soft food diet well. Denies n/v, constipation/diarrhea, or significant pain. Taking MVI and Vitamin B12 appropriately.Instructed pt to begin taking 500 mg calcium citrate BID and 5000 IU Vitamin D3. Educated pt on soft to bariatric regular diets, medications, developing an exercise regimen, and other dietary points of care. Provided  Education handout on items discussed. Pt to follow up with RD at 3 months post op.     Have you been to the hospital or seen by a doctor for any reason since your surgery? no

## 2021-06-13 NOTE — PROGRESS NOTES
"Russell Gomez is a 30 y.o. female who is being evaluated via a billable video visit.      The patient has been notified of following:     \"This video visit will be conducted via a call between you and your physician/provider. We have found that certain health care needs can be provided without the need for an in-person physical exam.  This service lets us provide the care you need with a video conversation.  If a prescription is necessary we can send it directly to your pharmacy.  If lab work is needed we can place an order for that and you can then stop by our lab to have the test done at a later time.    Video visits are billed at different rates depending on your insurance coverage. Please reach out to your insurance provider with any questions.    If during the course of the call the physician/provider feels a video visit is not appropriate, you will not be charged for this service.\"    Patient has given verbal consent to a Video visit? Yes  How would you like to obtain your AVS? AVS Preference: MyChart.  If dropped by the video visit, the video invitation should be sent to: Text to cell phone: [phone]  Will anyone else be joining your video visit? No        Video Start Time: 0815    Additional provider notes:  HPI: Pt is here for follow up of a laparoscopic sleeve gastrectomy 2 weeks ago. She is doing well. Taking po well, estimating she is getting in 50-60 oz of fluid. No vomiting.  No pain with drinking or eating purees. No fevers or chills. Ambulating without problems.     Current Outpatient Medications   Medication Sig Dispense Refill     albuterol (PROAIR HFA) 90 mcg/actuation inhaler Inhale 1-2 puffs every 4 (four) hours as needed for wheezing. 1 Inhaler 1     cyanocobalamin, vitamin B-12, 1,000 mcg Subl Place 1,000 mcg under the tongue daily.       gabapentin (NEURONTIN) 250 mg/5 mL solution Take 5 mL (250 mg total) by mouth every 8 (eight) hours as needed. 60 mL 0     omeprazole (PRILOSEC) 20 MG " capsule Take 1 capsule (20 mg total) by mouth daily before breakfast. For the first 6 weeks, open capsule & sprinkle contents into liquids or onto food. 90 capsule 0     pediatric multivitamin (FLINTSTONES) Chew chewable tablet Chew 1 tablet 2 (two) times a day.       [START ON 11/24/2020] ursodioL (ACTIGALL) 300 mg capsule Take 1 capsule (300 mg total) by mouth 2 (two) times a day. Start 2 wks after surgery. Do not cut, crush or open. Take with warm liquids. 180 capsule 1     No current facility-administered medications for this visit.          Wt (!) 224 lb (101.6 kg)   LMP 10/25/2020   BMI 38.45 kg/m    Wt Readings from Last 3 Encounters:   11/13/20 (!) 224 lb (101.6 kg)   10/27/20 (!) 238 lb 9 oz (108.2 kg)   10/15/20 (!) 248 lb (112.5 kg)     Body mass index is 38.45 kg/m .    EXAM:  GENERAL:Appears well  ABDOMEN: Incisions healing well      Assessment/Plan: Pt s/p laparoscopic sleeve gastrectomy. Doing well. Diet and activity discussed. She will [facility] in 1 month to meet with our dietician, and again at 3 months for additional follow up.    Homar Richey MD, FACS  Office: [phone]  [facility]   General and Bariatric Surgery      Video-Visit Details    Type of service:  Video Visit    Video End Time (time video stopped): 8:27 AM  Originating Location (pt. Location): Home    Distant Location (provider location):  [facility] [place]     Platform used for Video Visit: Bob Wiggins LPN

## 2021-06-14 NOTE — PROGRESS NOTES
Here for f/u non-surgical weight loss visit.  Pt is not taking any medications for weight loss at this time.  See flowsheet.    Arti Brown RN, N  [facility]  P [phone]  F [phone]

## 2021-06-14 NOTE — PROGRESS NOTES
Non-surgical Weight Loss Follow Up Diet Evaluation    Assessment:  This patient is a 27 y.o. female is being seen today for follow-up non-surgical nutritional evaluation. Today we reviewed the patients current eating habits and level of physical activity, and instructed on the changes that are required for successful weight loss outcomes.    Pt's Initial Weight: 266.5 lbs  Weight: 248 lb 8 oz (112.7 kg)  Weight loss from initial: 18  % Weight loss: 6.75 %  BMI: Body mass index is 42.65 kg/(m^2).  IBW: 120 lbs    Personal goal weight: not discussed    Estimated RMR ([place]-St Jeor equation): 1915 calories  Protein requirements (.5grams to .9grams per pound IBW, 20-30% of calories, minimum of 60-80gm per day):   grams    Progress made since last visit: Pt found protein supplements to be too expensive. Pt has started using apple cider vinegar for past 2 weeks. Pt drinks 8 oz prior to every meal. Pt using special K meal replacements instead now since it's cheaper.   Concerns: poor fiber intake, high CHO intake, poor protein intake.       Diet Recall/Time: *Pt drinks 8 oz of apple cider vinegar prior to each meal   Breakfast: special K protein drink and orange  Am Snack: none   Lunch: protein drink and apple   Pm snack:none   Dinner: 2 slices of pizza   HS Snack: none     Meals per week away from home: none      Recommended limiting eating out to no more than 2x/week.  Patient and I reviewed the importance of eating three consistent meals per day; as well as meal timing to be spaced 4-5 hours apart.  Snack choices: 100-150 calories (1-2x/day if physically hungry), incorporating a fruit/vegetable w/ protein source.    Meal Duration: not keeping track    Portion Sizes problematic? NO per patient/diet recall  Encouraged slowing meal times down, 20-30 minutes, chewing to applesauce consistency.   To aid in proper portion control and slow meal time down discussed consuming meals off smaller plates, use toddler/children  utensils and set utensils down after each bite.    Protein, vegetables/fruits, carbohydrates:   The patient and I discussed the importance of including lean/low fat protein at each meal and limiting carbohydrate intake to less than 25% of plate volume.       Vitamins/Mineral Supplementation: not discussed     Beverages (Type/Oz. per day)  Water: drink 40-60 oz of sugar free flavored water   Coffee: none   Tea: none   Milk: none     Discussed the importance of adequate hydration and the goal of 64+ oz of fluid daily.   The patient understands the importance of  avoiding all sweetened and alcoholic drinks, and instead choosing 64 oz plain water.    Exercise  Pt goes to [facility] for 35-55 minutes, 3 days/week.     Pt's understands that 45-60 minutes of daily activity is an important part of weight loss success.   Encouraged pt to incorporate  strength training exercise in addition to cardiovascular exercise most days of the week.    PES statement:     1.  (NC-3.3.5) Obese, class III, BMI ?40 related to Disordered eating pattern as evidenced by Overconsumption of  calorie dense  beverage; Inability to apply nutrition related recommendations; Unwillingness or disinterest in applying nutrition related recommendations.      Intervention:  Discussion:  1. Discussed eliminating apple cider vinegar and replace with water.  2. Reviewed lean protein sources.  20-30 gm protein at 3 meals daily. 60-80 grams daily total.  3. Discussed low sugar protein supplements to use as meal replacement   4. Discussed protein options that are non-perishable and can be packed for lunch   5. Reviewed Plate Method, with emphasis on variety of protein sources and fiber from fruits and vegetables   6. Plate Method: The patient and I discussed the importance of including lean/low  fat protein at each meal and limiting carbohydrate intake to less  than 25% of plate volume.    Instructions/Goals:   1. Include 20-30 gm protein at each meal.  2. Increase  vegetable/fruit intake, by having a vegetable or fruit with each meal daily. Recommended pt to increase vegetable/fruit intake to 4-5 servings daily.  3. Increase fluid intake to 64oz daily: choose plain or calorie/alcohol-free beverages.  4. Incorporate daily structured activity, 45-60 minutes most days of the week  5. Read food labels more consistently: keeping total fat grams <10, total sugar grams <10, fiber >3gm per serving.   6. Practice plate method: 1/2 plate lean/low fat protein source, vegetable/fruit, <25% of plate complex carbohydrates.  7. Carbohydrates from grain sources at meal times to be no more than 1 Carb Choice, ie: 15-20 gm total carbohydrate per serving  8. Practice eating off of smaller plates/bowls, chewing to applesauce consistency, taking 20-30 minutes to eat in a calm/relaxed environment without distractions of tv/email/cell phone.    Handouts Provided:  Plate method    Monitor/Evaluation:    Pt will f/u in one month with bariatrician, and f/u in two months with RD.    Plan for next visit with RD:    Review plate method and protein sources   Educated pt on food labels  Review carbohydrates/fiber  Exercise      Time In: 1:00pm  Time Out: 1:15pm      ABN signed: Yes

## 2021-06-14 NOTE — PROGRESS NOTES
Russell Gomez is a 30 y.o. female who is being evaluated via a billable video visit.       How would you like to obtain your AVS? MyChart.  If dropped from the video visit, the video invitation should be resent by: Send to e-mail at: [email or web address]  Will anyone else be joining your video visit? No     Video Start Time: 12:46 PM      Post-op Surgical Weight Loss Diet Evaluation     Assessment:  Pt presents for 3 month post-op RD visit, s/p LSG on 10-27-20 with Dr. Richey. Today we reviewed current eating habits and level of physical activity, and instructed on the changes that are required for successful bariatric outcomes.    Patient Progress: patient states she feels wonderful and full of energy, following vegan diet    Pt's Initial Weight: 266.5 lbs  Weight: 193 lb (87.5 kg)  Weight loss from initial: 73.5  % Weight loss: 27.58 %      Body mass index is 33.13 kg/m .    Patient Active Problem List   Diagnosis     Morbid obesity (H)     Intermittent asthma     Herpes Simplex     Abnormal Pap smear of cervix     Adjustment disorder with depressed mood     Low back pain     Episode of recurrent major depressive disorder (H)     Anemia     Acanthosis nigricans     Hirsutism     Vitamin D deficiency     Rh negative state in antepartum period, second trimester     Pre-syncope     Chlamydia infection     Morbid (severe) obesity due to excess calories (H)       Vitamins   Multi Vit with Iron: yes  Calcium Citrate: no- will start taking today  B12: yes  D3: no- will start taking today    Do you experience hunger? no  Do you experience any reflux or discomfort with eating? Yes- sometimes  Nausea: yes  Vomiting: yes- vegan   Diarrhea: no  Constipation: no  Hair loss:no    Diet Recall/Time:   Using measuring cups and portioning out foods  +protein shakes, vegan proteins, canned fruit    Proteins/Veg/Fruits/CHO (NOT well tolerated): some vegan proteins, peaches and pears, ice cream    Meal Duration:20  "minutes    Fluid-meal separation:  Fluids are  30min before and 30 minutes after meals.    Fluid Intake  Water: 64+oz    Exercise: dancing 4 days per week      PES statement:      (NC-1.4) Altered GI Function related to Alteration in gastrointestinal tract structure and/or function/ Decreased functional length of the GI tract as evidenced by Weight loss of 27.58% initial body weight; sleeve gastrectomy  Intervention    Discussion  1. Discussed 3 month Post-Op Nutritional Guidelines for LSG  2. Recommended to consume 15-20gm protein at 3 meals daily, along with protein supplement/\"planned protein containing snack\" of 15-30gm protein, to reach goal of 60-80 gm protein daily.  3. Educated on post-op vitamin regimen: Multi Vit + iron 2x/day, calcium citrate 400-600 mg 2x/day, 7703-3059 mcg of Sublingual B-12 daily, and 5000 IU Vitamin D3 daily (MVI and calcium can be taken at the same time BID)  4. Reviewed lean protein sources  5. Bariatric Plate Method-  including lean/low fat protein at each meal, including a vegetable/fruit, and limiting carbohydrate intake to less than 25% of plate volume.    Instructions  1. Include 15-20gm protein at each meal, along with protein supplement/\"planned protein containing snack\" of 15-30gm protein, to reach goal of 60-80 gm protein daily.  2. Increase fluid intake to 64oz daily: choose plain or calorie/carbonation-free beverages.  3. Incorporate daily structured activity, 30-60 minutes most days of the week  4. Recommended pt to start taking: Multi Vit + iron 2x/day, calcium citrate 400-600 mg 2x/day, 8605-2874 mcg of Sublingual B-12 daily, and 5000 IU Vitamin D3 daily. (MVI and calcium can be taken at the same time)  5. Read food labels more consistently: keeping total fat grams <10, total sugar grams <10, fiber >3gm per serving.  6. Increase vegetable/fruit intake, by having a vegetable or fruit with each meal daily.  7. Practice plate method: 1/2 plate lean/low fat " protein source, vegetable/fruit, <25% of plate complex carbohydrates.  8. Separate fluids 30 minutes before/after meal times.  9. Practice eating off of smaller plates/bowls, chewing to applesauce consistency, taking 20-30 minutes to eat in a calm/relaxed environment without distractions of tv/email/cell phone.    Handouts provided:  3 month Post-Op Nutritional Guidelines for LSG    Assessment/Plan:    Pt to follow up for 6 months  post-op visit with bariatrician     Video-Visit Details    Type of service:  Video Visit    Video End Time (time video stopped): 12:48 PM  Originating Location (pt. Location): Home    Distant Location (provider location):  [facility] [place]     Platform used for Video Visit: Reginald

## 2021-06-14 NOTE — PROGRESS NOTES
"Bariatric Follow-up    27 y.o.  female BMI:Body mass index is 43.68 kg/(m^2).    Weight:   Wt Readings from Last 1 Encounters:   11/28/17 (!) 254 lb 8 oz (115.4 kg)    pounds  Height: 5' 4\" (1.626 m) (11/28/2017 11:49 AM)  Initial Weight: 266.5 lbs (11/28/2017 11:49 AM)  Weight: 254 lb 8 oz (115.4 kg) (11/28/2017 11:49 AM)  Weight loss from initial: 12 (11/28/2017 11:49 AM)  % Weight loss: 4.5 % (11/28/2017 11:49 AM)  BMI (Calculated): 43.7 (11/28/2017 11:49 AM)  SpO2: 100 % (4/21/2017 10:34 AM)    Comorbidities:  Patient Active Problem List   Diagnosis     Morbid obesity     Intermittent asthma     Herpes Simplex     Abnormal Pap smear of cervix     Adjustment disorder with depressed mood     Low back pain     Depression     Anemia     Fatigue     Acanthosis nigricans     Hirsutism     Vitamin D deficiency     Interim: Frustrated as she is exercising and not losing weight. Using a sauna suit, apple cider vinear before meals, still bingeing at night. Vyvanse didn't work.  She is using Slim Fast in the am and premier protein in the afternoon. Doing Lynette. Maintains a 12# weight loss. When not cooking uses Lean Cuisine.  Apple watch shows up to 9,000 steps. Has tried phentermine, vyvanse and naltrexone. Phentermine worked the best but had a dry  outh  Kept a food diary but left it at home. Made some changes after looking at her food diary. Loves her apple cider vinegar.     Plan: solid warm foods. Real foods ore than supplements. Continue consistent physical activity. Phentermine. Increase water between meals  -We reviewed her medications and whether associated with weight gain. She is on weight neutral medications.  Protect sleep. Food diary and bring in next visit. Dietitian.    We discussed [facility] Bariatric Basics including:  -eating 3 meals daily  -eating protein first  -eating slowly, chewing food well  -avoiding/limiting calorie containing beverages  -choosing wheat, not white with breads, crackers, pastas, " edwina, bagels, tortillas, rice  -limiting restaurant or cafeteria eating to twice a week or less  -We discussed the importance of restorative sleep and stress management in maintaining a healthy weight.  -We discussed insulin resistance and glycemic index as it relates to appetite and weight control  -We discussed the National Weight Control Registry healthy weight maintenance strategies and ways to optimize metabolism.  -We discussed the importance of physical activity including cardiovascular and strength training in maintaining a healthier weight and explored viable options.    Most recent labs:  Lab Results   Component Value Date    WBC 9.6 10/28/2016    HGB 11.3 (L) 07/21/2017    HCT 37.9 10/28/2016    MCV 72 (L) 10/28/2016     10/28/2016     Lab Results   Component Value Date    CHOL 138 10/28/2016     Lab Results   Component Value Date    HDL 52 10/28/2016     Lab Results   Component Value Date    LDLCALC 71 10/28/2016     Lab Results   Component Value Date    TRIG 74 10/28/2016     Lab Results   Component Value Date    ALT 26 (L) 12/29/2011    AST 15 12/29/2011    ALKPHOS 99 12/29/2011    BILITOT 0.37 12/29/2011     Lab Results   Component Value Date    HGBA1C 5.6 07/21/2017     Lab Results   Component Value Date    XQOCBERM99 775 10/28/2016     Lab Results   Component Value Date    HVPGCSFV10MY 40.0 07/21/2017     Lab Results   Component Value Date    FERRITIN 18 10/28/2016     Lab Results   Component Value Date    PTH 82 10/28/2016       Lab Results   Component Value Date    TSH 1.00 10/21/2016     DIETARY HISTORY  Meals Per Day: 3  Eating Protein First?: yes  Food Diary: B:protein Slim Fast and fruit L:Premier Protein and fruit D:mac and cheese and baked chicken and veggies  Snacks Per Day: carrots with dip  Typical Snack: string cheese or apple   Fluid Intake: intentional  Portion Control: problematice  Calorie Containing Beverages: no  Alcohol per week: none  Typical Protein Food Choices: eggs,  "cheese, chicken, chile, turkey, fish  Choosing Whole Grains: sometimes  Grocery Shopping is done by: she does  Food Preparation is done by: she does  Meals at Restaurant/Cafeteria/Take Out Per Week: 0-1  Eating at the Table: yes  TV is Off During Meals: yes    Positive Changes Since Last Visit: maintains a 12# weight loss  Struggling With: losing more weight    Knowledgeable in Reading Food Labels: yes  Getting Adequate Protein: yes  Sleeping 7-8 hours/day yes but it it interrupted  Stress management -\"doesn't eat for hours\"    PHYSICAL ACTIVITY PATTERNS:  Cardiovascular: 4 days a week high intensity  Strength Training: akhil    REVIEW OF SYSTEMS  GENERAL/CONSTITUTIONAL:  Fatigue: yes  CARDIOVASCULAR:  Chest Pain with Exertion: no  PULMONARY:  Dyspnea on exertion: yes  CPAP Use: no  Tobacco Use: never  Asthma Controlled: yes  GASTROINTESTINAL:  GERD/Heartburn: none  Gallbladder:   UROLOGIC:  Urinary Symptoms: no  NEUROLOGIC:  Headaches: yes with not eating  Paresthesias: no  PSYCHIATRIC:  Moods: down  MUSCULOSKELETAL/RHEUMATOLOGIC  Arthralgias: OK  Myalgias: OK  ENDOCRINE:  Monitoring Blood Sugars: no  Sugars Well Controlled: yes  DERMATOLOGIC:  Rashes: acanthosis, skin tags    PHYSICAL EXAM:  Vitals: /68  Pulse 75  Resp 16  Ht 5' 4\" (1.626 m)  Wt (!) 254 lb 8 oz (115.4 kg)  BMI 43.68 kg/m2  Height: 5' 4\" (1.626 m) (11/28/2017 11:49 AM)  Initial Weight: 266.5 lbs (11/28/2017 11:49 AM)  Weight: 254 lb 8 oz (115.4 kg) (11/28/2017 11:49 AM)  Weight loss from initial: 12 (11/28/2017 11:49 AM)  % Weight loss: 4.5 % (11/28/2017 11:49 AM)  BMI (Calculated): 43.7 (11/28/2017 11:49 AM)  SpO2: 100 % (4/21/2017 10:34 AM)    GEN: Pleasant, well groomed, in no acute disress  EYES: EOMI,  ENT: airway patent  NECK: no carotid bruits, no anterior/supraclavicular lymphadenopathy, thyroid normal   HEART: Rhythm regular, rate regular, no murmur   LUNGS: Clear  ABDOMEN: soft, non-tender, obese, no rashes   VASCULAR: trace " bilateral  lower extremity edema  MUSCULOSKELETAL:  muscle mass Good for age  SKIN:  no color changes of venous stasis, no ulcerations    Time spent with patients 30 minutes, >50% in counseling and coordination of care.

## 2021-06-15 NOTE — TELEPHONE ENCOUNTER
New Appointment Needed  What is the reason for the visit:    Pregnancy Confirmation Appt Needed  When was the first day of your last menstrual cycle?: 1/23  Have you done a home pregnancy test?: Yes: 1.    Provider Preference: Any available  How soon do you need to be seen?: today  Waitlist offered?: No  Okay to leave a detailed message:  Yes

## 2021-06-15 NOTE — PROGRESS NOTES
Assessment/Plan:     Russell Gomez is a 31 y.o.  here for confirmation of pregnancy.    1. Pregnancy confirmed by positive urine test  - Pregnancy (Beta-hCG, Qual), Urine  - Ambulatory referral to Obstetrics / Gynecology: She will call [facility] and get scheduld to see them for pregnancy care. No ultrasound ordered today, as obgyn will be able to perform at their office.    Medications were reviewed for safety in pregnancy: ok to continue all that she is on  Risk factors identified today:  Laparoscopic sleeve gastrectomy 4 months ago, BMI 34, hx  delivery, hx 2nd trimester fetal loss.    She is very surprised to be pregnant, and had many good questions regarding what she can safely do to have a healthy pregnancy. I recommend to continue all current meds and supplements, ok to exercise as tolerated, limit caffeine to 3 or fewer servings daily.    30 minutes spent on the date of the encounter doing chart review, history and exam, documentation and further activities as noted above     Subjective:      Russell Gomez is a 31 y.o. female who presents for evaluation of missed period.   Patient's last menstrual period was 2021 (exact date).  Periods have been a little irregular, but was tracking with telephone bhumi and noticed period was late.  Occassional vomitting, but attributed it to bypass.  Couldn't lay on stomach, which is a little unusual.    Current contraception:  condoms    Risk behaviors:    Tobacco use:  reports that she has never smoked. She has never used smokeless tobacco.      Current Outpatient Medications:      albuterol (PROAIR HFA) 90 mcg/actuation inhaler, Inhale 1-2 puffs every 4 (four) hours as needed for wheezing., Disp: 1 Inhaler, Rfl: 1     calcium carbonate 1250 MG capsule, Take 1,250 mg by mouth 2 (two) times a day with meals., Disp: , Rfl:      cyanocobalamin, vitamin B-12, 1,000 mcg Subl, Place 1,000 mcg under the tongue daily., Disp: , Rfl:      ergocalciferol  "(VITAMIN D2) 1,250 mcg (50,000 unit) capsule, Take 50,000 Units by mouth every 7 days., Disp: , Rfl:      pediatric multivitamin (FLINTSTONES) Chew chewable tablet, Chew 1 tablet 2 (two) times a day., Disp: , Rfl:      ursodioL (ACTIGALL) 300 mg capsule, Take 1 capsule (300 mg total) by mouth 2 (two) times a day. Start 2 wks after surgery. Do not cut, crush or open. Take with warm liquids., Disp: 180 capsule, Rfl: 1    OB History    Para Term  AB Living   4 2 0 2 2 1   SAB TAB Ectopic Multiple Live Births   0 0 0 1 3      # Outcome Date GA Lbr Gagan/2nd Weight Sex Delivery Anes PTL Lv   4A  19 21w1d   M    ND      Birth Comments: PPROM twins   4B  19    M    ND      Birth Comments: PPROM twins   3  11 31w0d   M Vag-Spont   EDEN   2 AB            1 AB                      Objective:      /68   Pulse 68   Temp 98.7  F (37.1  C) (Oral)   Resp 18   Ht 5' 4\" (1.626 m)   Wt 199 lb (90.3 kg)   LMP 2021 (Exact Date)   SpO2 97%   BMI 34.16 kg/m    Gen:  A&A, NAD.      Lab Review  Results for orders placed or performed in visit on 21   Pregnancy (Beta-hCG, Qual), Urine   Result Value Ref Range    Pregnancy Test, Urine Positive (!) Negative         "

## 2021-06-15 NOTE — TELEPHONE ENCOUNTER
Pt has a appointment with Dr. Butler @ [facility] clinic on 3/5/21. Complete task.

## 2021-06-16 PROBLEM — O26.892 RH NEGATIVE STATE IN ANTEPARTUM PERIOD, SECOND TRIMESTER: Status: ACTIVE | Noted: 2019-05-03

## 2021-06-16 PROBLEM — F32.A DEPRESSIVE DISORDER: Status: ACTIVE | Noted: 2021-03-26

## 2021-06-16 PROBLEM — E66.01 MORBID (SEVERE) OBESITY DUE TO EXCESS CALORIES (H): Status: ACTIVE | Noted: 2020-10-27

## 2021-06-16 PROBLEM — R55 PRE-SYNCOPE: Status: ACTIVE | Noted: 2019-05-03

## 2021-06-16 PROBLEM — A74.9 CHLAMYDIA INFECTION: Status: ACTIVE | Noted: 2020-07-17

## 2021-06-16 PROBLEM — Z67.91 RH NEGATIVE STATE IN ANTEPARTUM PERIOD, SECOND TRIMESTER: Status: ACTIVE | Noted: 2019-05-03

## 2021-06-16 PROBLEM — Z98.84 HISTORY OF BARIATRIC SURGERY: Status: ACTIVE | Noted: 2021-03-26

## 2021-06-16 PROBLEM — Z86.32 HISTORY OF GESTATIONAL DIABETES MELLITUS: Status: ACTIVE | Noted: 2021-04-06

## 2021-06-16 NOTE — TELEPHONE ENCOUNTER
----- Message from Elva Fields RD sent at 1/29/2021 12:48 PM CST -----  Regarding: labs in house  May has her 6 month post op scheduled with Dr. Pierre on April 30. She will do labs in house.

## 2021-06-17 NOTE — PROGRESS NOTES
"Russell Gomez is a 31 y.o. female who is being evaluated via a billable video visit.       The patient has been notified of following:     \"This video visit will be conducted via a call between you and your physician/provider. We have found that certain health care needs can be provided without the need for an in-person physical exam.  This service lets us provide the care you need with a video conversation.  If a prescription is necessary we can send it directly to your pharmacy.  If lab work is needed we can place an order for that and you can then stop by our lab to have the test done at a later time.    Video visits are billed at different rates depending on your insurance coverage. Please reach out to your insurance provider with any questions.    If during the course of the call the physician/provider feels a video visit is not appropriate, you will not be charged for this service.\"    Patient has given verbal consent to a Video visit? Yes  How would you like to obtain your AVS? AVS Preference: MyChart.  If dropped by the video visit, the video invitation should be sent to: Send to e-mail at: [email or web address]  Will anyone else be joining your video visit? No      Video Start Time: 4:00 PM    Pregnancy in Surgical Weight Loss Diet Evaluation     Assessment:  Pt, presents for pregnancy s/p LSG on 10-27-20 with Dr. Richey. Today we reviewed current eating habits, physical activity, and discussed nutrition recommendations for a healthy pregnancy.    Patient Progress: has low energy, resting a lot- dropped to 178lb  Estimated Delivery Date: Oct 30  Current weight change since start of pregnancy: 2lb gain  Pt's Initial Weight: 266.5 lbs  Weight: 178 lb (80.7 kg)  Weight loss from initial: 88.5  % Weight loss: 33.21 %  Weight (Patient Reported): 178 lb (80.7 kg)  Height (Patient Reported): 5' 4\" (1.626 m)  BMI (Based on Pt Reported Ht/Wt): 30.55      Recommended Weight Gain During Pregnancy  Prepregnancy BMI " 18.5-24.9: 25-35 lbs (11.5-16 kg)  Prepregnancy BMI 25-29.9:15-25 lbs (7-11.5 kg)  Prepregnancy BMI > or = 30: 11-18 lbs (5-9 kg)    Patient Active Problem List   Diagnosis     Morbid obesity (H)     Intermittent asthma     Herpes Simplex     Abnormal Pap smear of cervix     Adjustment disorder with depressed mood     Low back pain     Episode of recurrent major depressive disorder (H)     Anemia     Acanthosis nigricans     Hirsutism     Vitamin D deficiency     Rh negative state in antepartum period, second trimester     Pre-syncope     Chlamydia infection     Morbid (severe) obesity due to excess calories (H)     Atypical squamous cell changes of undetermined significance favor dysplasia     Depressive disorder     History of bariatric surgery     History of gestational diabetes mellitus     Human papilloma virus infection       Estimated RMR (Jenkins-St Jeor equation):   1518 kcals x 1.2 (sedentary) = 1800 kcals (for weight maintenance)  Additional 340 calories in 2nd trimester for normal prepregnancy BMI  Additional 450 calories in 3rd trimester for normal prepregnancy BMI    Vitamins   Prenatal Vit with Iron: yes  Calcium Citrate: yes  B12: yes  D3: yes    Diet Recall/Time:   Wake at 8a  Breakfast: 2 pieces of keen, 1 blueberry bagel with cream cheese and wheat toast- sometimes will do scrambled eggs  Lunch: 12p- turkey sausage with wheat toast- sometimes McChicken   Dinner: 5-6p- vegan macaroni, 1/4 cup broccoli, travon steak  Vanilla ice cream-    Typical Snacks: can of ravioli, cheez-its, trail mix, granola bar with peanut butter    Proteins/Veg/Fruits/CHO (NOT well tolerated): carrots, fast food    Estimated protein intake: 80 grams    Estimated portion size per meals:1/2-3/4 cup/meal    Meal Duration:20 minutes    Fluid-meal separation:  Fluids are  30min before and 30 minutes after meals.    Beverages: peach tea water- flavored water    Exercise: not discussed    Nutrition Diagnosis (PES  "statement):      (NC-1.4) Altered GI Function related to Alteration in gastrointestinal tract structure and/or function/ Decreased functional length of the GI tract as evidenced by Weight loss of 32.46% initial body weight; sleeve gastrectomy  Intervention    Discussion  1. Recommended to consume 15-20gm protein at 3 meals daily, along with protein supplement/\"planned protein containing snack\" of 15-30gm protein, to reach goal of 60-80 gm protein daily.  2. Reviewed lean protein sources  3. Bariatric Plate Method-  including lean/low fat protein at each meal, including a vegetable/fruit, and limiting carbohydrate intake to less than 25% of plate volume.   Instructions  1. Include 15-20gm protein at each meal, along with protein supplement/\"planned protein containing snack\" of 15-30gm protein, to reach goal of 60-80 gm protein daily.  2. Increase fluid intake to 64oz daily: choose plain or calorie/carbonation-free beverages.  3. Incorporate daily structured activity, 30-60 minutes most days of the week  4. Recommended pt to start taking: Pre Vit + iron 2x/day, calcium citrate 400-600 mg 2x/day, 4495-6693 mcg of Sublingual B-12 daily, and 5000 IU Vitamin D3 daily. (MVI and calcium can be taken at the same time)  5. Read food labels more consistently: keeping total fat grams <10, total sugar grams <10, fiber >3gm per serving.  6. Increase vegetable/fruit intake, by having a vegetable or fruit with each meal daily.  7. Practice plate method: 1/2 plate lean/low fat protein source, vegetable/fruit, <25% of plate complex carbohydrates.  8. Separate fluids 30 minutes before/after meal times.  9. Practice eating off of smaller plates/bowls, chewing to applesauce consistency, taking 20-30 minutes to eat in a calm/relaxed environment without distractions of tv/email/cell phone.    Assessment/Plan:    Pt to follow up in 3 months with RD    Video-Visit Details    Type of service:  Video Visit    Video End Time (time video " stopped): 4:23 PM  Originating Location (pt. Location): Home    Distant Location (provider location):  [facility]     Platform used for Video Visit: Reginald Fields RD

## 2021-06-17 NOTE — PROGRESS NOTES
Russell Gomez is 31 y.o.  female who presents for a billable video visit today.    How would you like to obtain your AVS? MyChart.  If dropped from the video visit, the video invitation should be resent by: Text to cell phone: [phone]  Will anyone else be joining your video visit? No      Video Start Time: 3:30 pm      Bariatric Follow Up Visit with a History of Previous Bariatric Surgery     Date of visit: 4/30/2021  Physician: iJnny Pierre MD  Primary Care Provider:  Rito Mills MD Mayeisha N Tatum   31 y.o.  female    Date of Surgery: 10/27/2020  Initial Weight: 260#  Initial BMI: 45.33  Today's Weight:   Wt Readings from Last 1 Encounters:   04/30/21 178 lb (80.7 kg)     Body mass index is 30.55 kg/m .      Assessment and Plan     Assessment: Russell is a 31 y.o. year old female who is 6 monhts s/p  Sleeve Gastrectomy with Dr. Rossi Wells DAVID Patricia feels as if she has achieved the goals she hoped to accomplish through bariatric surgery and weight loss.    Encounter Diagnoses   Name Primary?     Postoperative malabsorption Yes     Pregnant state, incidental          Current Outpatient Medications:      albuterol (PROAIR HFA) 90 mcg/actuation inhaler, Inhale 1-2 puffs every 4 (four) hours as needed for wheezing., Disp: 1 Inhaler, Rfl: 1     calcium carbonate 1250 MG capsule, Take 1,250 mg by mouth 2 (two) times a day with meals., Disp: , Rfl:      cyanocobalamin, vitamin B-12, 1,000 mcg Subl, Place 1,000 mcg under the tongue daily., Disp: , Rfl:      ergocalciferol (VITAMIN D2) 1,250 mcg (50,000 unit) capsule, Take 50,000 Units by mouth every 7 days., Disp: , Rfl:      omeprazole 20 mg TbLD, omeprazole 20 mg delayed release,disintegrating tablet  Take by oral route., Disp: , Rfl:      ondansetron (ZOFRAN-ODT) 4 MG disintegrating tablet, ondansetron 4 mg disintegrating tablet  DISSOLVE 1 TABLET IN MOUTH EVERY 8 HOURS AS NEEDED, Disp: , Rfl:      ondansetron (ZOFRAN-ODT) 4 MG disintegrating  "tablet, DISSOLVE 1 TABLET IN MOUTH EVERY 8 HOURS AS NEEDED, Disp: , Rfl:      pediatric multivitamin (FLINTSTONES) Chew chewable tablet, Chew 1 tablet 2 (two) times a day., Disp: , Rfl:      promethazine (PHENERGAN) 25 MG suppository, promethazine 25 mg rectal suppository  INSERT 1 SUPPOSITORY RECTALLY EVERY 8 HOURS, Disp: , Rfl:      promethazine (PHENERGAN) 25 MG suppository, INSERT 1 SUPPOSITORY RECTALLY EVERY 8 HOURS, Disp: , Rfl:      ursodioL (ACTIGALL) 300 mg capsule, Take 1 capsule (300 mg total) by mouth 2 (two) times a day. Start 2 wks after surgery. Do not cut, crush or open. Take with warm liquids., Disp: 180 capsule, Rfl: 1    Plan: Continue 2 PNV daily. Restart Vitamin D at 5,000 weekly, B-12 SL. Dieitian f/u each trimester and prn    Return for Next scheduled follow up.    Bariatric Surgery Review     Interim History/LifeChanges: Was doing exceptionally well. Unintended pregnancy. Currently 14 weeks pregnant.  Nausea and vomiting, Taking her PNV. Was told to stop her vitamin B-12 and vitamin D. She has not had her COVID vaccine. Maintaining a 88# weight loss.     Patient Concerns: nutrition  Appetite (1-10): nausea  GERD: yes-on and off     Medication changes: stopped ursodiol    Vitamin Intake:   B-12   none   MVI  2 gummi PNV   Vitamin D     Calcium        Other                LABS: ordered    Nausea yes  Vomiting yes  Constipation yes-on a stool softener  Diarrhea no  Rashes no  Hair Loss no  Calf tenderness no  Breathing difficulty no  Reactive Hypoglycemia no  Light Headedness no   Moods on and off-    12 point ROS as above and otherwise negative      Habits:  Alcohol: no  Tobacco: no  Caffeine no  NSAIDS no  Exercise Routine: ADLs  3 meals/day yes  Protein first yes  ?grams/day  Water Separate from meals yes  Calorie Containing Beverages no  Restaurant eating/wk no  Sleeping \"kind of\" OK. Naps.   Stress moderate  CPAP: NA  Contraception: none  DEXA:Not indicated    Social History     Social " History     Socioeconomic History     Marital status: Single     Spouse name: Not on file     Number of children: Not on file     Years of education: Not on file     Highest education level: Not on file   Occupational History     Not on file   Social Needs     Financial resource strain: Not on file     Food insecurity     Worry: Not on file     Inability: Not on file     Transportation needs     Medical: Not on file     Non-medical: Not on file   Tobacco Use     Smoking status: Never Smoker     Smokeless tobacco: Never Used   Substance and Sexual Activity     Alcohol use: No     Drug use: No     Sexual activity: Yes     Partners: Male     Birth control/protection: Condom   Lifestyle     Physical activity     Days per week: Not on file     Minutes per session: Not on file     Stress: Not on file   Relationships     Social connections     Talks on phone: Not on file     Gets together: Not on file     Attends [employer]ism service: Not on file     Active member of club or organization: Not on file     Attends meetings of clubs or organizations: Not on file     Relationship status: Not on file     Intimate partner violence     Fear of current or ex partner: Not on file     Emotionally abused: Not on file     Physically abused: Not on file     Forced sexual activity: Not on file   Other Topics Concern     Not on file   Social History Narrative    Single, one son, Son's father is in [place] and not involved    Close with her father who lives in [place]    Not close with her mother who lives here     Miscarried twin boys       Past Medical History     Past Medical History:   Diagnosis Date     Acanthosis nigricans      Accessory skin tags      Acute Gonorrhea     Created by Conversion      Anemia      Asthma      Bacterial vaginosis 05/03/2019    3/21/19     Cervical insufficiency during pregnancy in second trimester, antepartum 05/03/2019     Chlamydia      Depression      Dichorionic diamniotic twin pregnancy in second  trimester 2019     Diet controlled gestational diabetes mellitus (GDM) in second trimester 2019     Gestational diabetes      Heartburn      Herpes simplex      Hirsutism      History of  delivery, currently pregnant in first trimester 2019     Low back pain      Menorrhagia      Morbid obesity (H)       labor 2011     Problem List     Patient Active Problem List   Diagnosis     Morbid obesity (H)     Intermittent asthma     Herpes Simplex     Abnormal Pap smear of cervix     Adjustment disorder with depressed mood     Low back pain     Episode of recurrent major depressive disorder (H)     Anemia     Acanthosis nigricans     Hirsutism     Vitamin D deficiency     Rh negative state in antepartum period, second trimester     Pre-syncope     Chlamydia infection     Morbid (severe) obesity due to excess calories (H)     Atypical squamous cell changes of undetermined significance favor dysplasia     Depressive disorder     History of bariatric surgery     History of gestational diabetes mellitus     Human papilloma virus infection     Medications     Current Outpatient Medications   Medication Sig Note     albuterol (PROAIR HFA) 90 mcg/actuation inhaler Inhale 1-2 puffs every 4 (four) hours as needed for wheezing.      calcium carbonate 1250 MG capsule Take 1,250 mg by mouth 2 (two) times a day with meals.      cyanocobalamin, vitamin B-12, 1,000 mcg Subl Place 1,000 mcg under the tongue daily. 10/27/2020: Starting after surgery     ergocalciferol (VITAMIN D2) 1,250 mcg (50,000 unit) capsule Take 50,000 Units by mouth every 7 days.      omeprazole 20 mg TbLD omeprazole 20 mg delayed release,disintegrating tablet   Take by oral route.      ondansetron (ZOFRAN-ODT) 4 MG disintegrating tablet ondansetron 4 mg disintegrating tablet   DISSOLVE 1 TABLET IN MOUTH EVERY 8 HOURS AS NEEDED      ondansetron (ZOFRAN-ODT) 4 MG disintegrating tablet DISSOLVE 1 TABLET IN MOUTH EVERY 8 HOURS AS  "NEEDED      pediatric multivitamin (FLINTSTONES) Chew chewable tablet Chew 1 tablet 2 (two) times a day. 10/27/2020: Starting after surgery     promethazine (PHENERGAN) 25 MG suppository promethazine 25 mg rectal suppository   INSERT 1 SUPPOSITORY RECTALLY EVERY 8 HOURS      promethazine (PHENERGAN) 25 MG suppository INSERT 1 SUPPOSITORY RECTALLY EVERY 8 HOURS      ursodioL (ACTIGALL) 300 mg capsule Take 1 capsule (300 mg total) by mouth 2 (two) times a day. Start 2 wks after surgery. Do not cut, crush or open. Take with warm liquids. 10/27/2020: Start after surgery     Surgical History     Past Surgical History  She has a past surgical history that includes Tonsillectomy and pr lap, juanito restrict proc, longitudinal gastrectomy (N/A, 10/27/2020).    Objective-Exam     Constitutional:  Ht 5' 4\" (1.626 m)   Wt 178 lb (80.7 kg)   BMI 30.55 kg/m    Height: 5' 4\" (1.626 m) (4/30/2021  3:00 PM)  Initial Weight: 266.5 lbs (4/30/2021  3:00 PM)  Weight: 178 lb (80.7 kg) (4/30/2021  3:00 PM)  Weight loss from initial: 88.5 (4/30/2021  3:00 PM)  % Weight loss: 33.21 % (4/30/2021  3:00 PM)  BMI (Calculated): 30.5 (4/30/2021  3:00 PM)  SpO2: 97 % (3/5/2021  3:42 PM)    General:  Pleasant and in no acute distress   Psychiatric: alert and oriented X3, mood and affect normal    Counseling     We reviewed the important post op bariatric recommendations:  -eating 3 meals daily  -eating protein first, getting >60gm protein daily  -eating slowly, chewing food well  -avoiding/limiting calorie containing beverages  -drinking water 15-30 minutes before or after meals  -choosing wheat, not white with breads, crackers, pastas, edwina, bagels, tortillas, rice  -limiting restaurant or cafeteria eating to twice a week or less    We discussed the importance of restorative sleep and stress management in maintaining a healthy weight.  We discussed the National Weight Control Registry healthy weight maintenance strategies and ways to optimize " metabolism.  We discussed the importance of physical activity including cardiovascular and strength training in maintaining a healthier weight.    We discussed the importance of life-long vitamin supplementation and life-long  follow-up.    Russell was reminded that, to avoid marginal ulcers she should avoid tobacco at all, alcohol in excess, caffeine in excess, and NSAIDS (unless indicated for cardioprotection or othewise and opposed by a PPI).    Jinny Pierre MD, [facility].  4/30/2021  3:41 PM    Total time spent on the date of this encounter doing: chart review, review of test results, patient visit, physical exam, education, counseling, developing plan of care, and documenting = 30minutes.          Video-Visit Details    Type of service:  Video Visit    Video End Time (time video stopped): 4:00  Originating Location (pt. Location): Home    Distant Location (provider location):  [facility] [place]     Platform used for Video Visit: Interventional Spine

## 2021-06-17 NOTE — TELEPHONE ENCOUNTER
Called patient and got VM. Left VM for patient to call us back to set up an appointment with the dietitian. Patient to set up dietitian appointment ASAP per Dr. Pierre. [phone]

## 2021-06-17 NOTE — TELEPHONE ENCOUNTER
Telephone Encounter by Pee Avery at 5/21/2020 10:05 AM     Author: Pee Avery Service: -- Author Type: --    Filed: 5/21/2020 10:10 AM Encounter Date: 5/20/2020 Status: Signed    : Pee Avery       PRIOR AUTHORIZATION DENIED    Denial Rational: Drugs used for weight loss are not a covered benefit under the patient's pharmacy benefits.      Appeal Information: If the provider would like to appeal this denial, please provide a letter of medical necessity and once it has been completed and placed in the patient's chart, notify the [facility] Team ([facility] MED [id number]) and the appeal can be initiated on behalf of the patient and provider.  Please also include any therapies that the patient has tried and their outcomes.

## 2021-06-18 NOTE — PROGRESS NOTES
"Bariatric Follow-up    28 y.o.  female BMI:Body mass index is 42.83 kg/(m^2).    Weight:   Wt Readings from Last 1 Encounters:   05/15/18 (!) 249 lb 8 oz (113.2 kg)    pounds  Height: 5' 4\" (1.626 m) (5/15/2018  8:01 AM)  Initial Weight: 266.5 lbs (5/15/2018  8:01 AM)  Weight: 249 lb 8 oz (113.2 kg) (5/15/2018  8:01 AM)  Weight loss from initial: 17 (5/15/2018  8:01 AM)  % Weight loss: 6.38 % (5/15/2018  8:01 AM)  BMI (Calculated): 42.8 (5/15/2018  8:01 AM)  SpO2: 98 % (5/15/2018  8:01 AM)    Comorbidities:  Patient Active Problem List   Diagnosis     Morbid obesity     Intermittent asthma     Herpes Simplex     Abnormal Pap smear of cervix     Adjustment disorder with depressed mood     Low back pain     Depression     Anemia     Fatigue     Acanthosis nigricans     Hirsutism     Vitamin D deficiency     Interim: She joined [employer] March 18 She is taking phentermine after breakfast or lunch depending on her routine. On days she binges at night she takes it after lunch.  She maintains a 17# weight loss. She is frustrated about her plateau. Her food diary is excellent, her exercise has improved. Evening eating may be hindering weight loss.  Extra caramel Latte at [employer] (67- calories, 27gm fat). BP, A1c and lipids look good. \"I need something stronger\" phentermine isn't helping. I need something to help me not want the bad foods and fast foods.    Plan: Address evening eating-explored strategies. Taking her phentermine at noon helps. Stop the [employer] Frappe's-explored \"lite\" or \"skinny\" options at [facility]. Next visit body composition. Continue healthy choices, continue consistent physical activity  -We reviewed her medications and whether associated with weight gain. She is not taking medications associated with weight gain.    We discussed [facility] Bariatric Basics including:  -eating 3 meals daily  -eating protein first  -eating slowly, chewing food well  -avoiding/limiting calorie containing " beverages  -choosing wheat, not white with breads, crackers, pastas, edwina, bagels, tortillas, rice  -limiting restaurant or cafeteria eating to twice a week or less  -We discussed the importance of restorative sleep and stress management in maintaining a healthy weight.  -We discussed insulin resistance and glycemic index as it relates to appetite and weight control  -We discussed the National Weight Control Registry healthy weight maintenance strategies and ways to optimize metabolism.  -We discussed the importance of physical activity including cardiovascular and strength training in maintaining a healthier weight and explored viable options.    Most recent labs:  Lab Results   Component Value Date    WBC 9.6 10/28/2016    HGB 11.3 (L) 07/21/2017    HCT 37.9 10/28/2016    MCV 72 (L) 10/28/2016     10/28/2016     Lab Results   Component Value Date    CHOL 138 10/28/2016     Lab Results   Component Value Date    HDL 52 10/28/2016     Lab Results   Component Value Date    LDLCALC 71 10/28/2016     Lab Results   Component Value Date    TRIG 74 10/28/2016     Lab Results   Component Value Date    ALT 26 (L) 12/29/2011    AST 15 12/29/2011    ALKPHOS 99 12/29/2011    BILITOT 0.37 12/29/2011     Lab Results   Component Value Date    HGBA1C 5.6 07/21/2017     Lab Results   Component Value Date    NRNGGOYF89 775 10/28/2016     Lab Results   Component Value Date    VFYXXFSL15NW 40.0 07/21/2017     Lab Results   Component Value Date    FERRITIN 18 10/28/2016     Lab Results   Component Value Date    PTH 82 10/28/2016     Lab Results   Component Value Date    TSH 1.00 10/21/2016     DIETARY HISTORY  Meals Per Day: yes  Eating Protein First?: yes  Food Diary: B:2 eggs, orange L:lunchable D:[employer] chicken sandwich, small fries  Snacks Per Day: couple  Typical Snack: grapes, oranges  Fluid Intake: good water drinker  Portion Control: improving, decreasing   Calorie Containing Beverages: none  Alcohol per week:  "non  Typical Protein Food Choices: see above  Choosing Whole Grains: typically  Grocery Shopping is done by: herself  Food Preparation is done by: herself  Meals at Restaurant/Cafeteria/Take Out Per Week: frappes couple times a week or 3  Eating at the Table: yes  TV is Off During Meals: yes    Positive Changes Since Last Visit: maintaining a 17# weight loss and exercising  Struggling With: further weight loss    Knowledgeable in Reading Food Labels: yes  Getting Adequate Protein: yes  Sleeping 7-8 hours/day on and off, 5-7 hours  Stress management doing better with working out    PHYSICAL ACTIVITY PATTERNS:  Cardiovascular: [employer] cardio machines  Strength Training: Machines    REVIEW OF SYSTEMS  GENERAL/CONSTITUTIONAL:  Fatigue: yes-leading to the caramel frappe  CARDIOVASCULAR:  Chest Pain with Exertion: no  PULMONARY:  Dyspnea on exertion: yes  CPAP Use: no  Tobacco Use: no  Asthma Controlled: yes  GASTROINTESTINAL:  GERD/Heartburn:   Gallbladder:   UROLOGIC:  Urinary Symptoms: no  NEUROLOGIC:  Headaches: no  Paresthesias: no  PSYCHIATRIC:  Moods: OK  MUSCULOSKELETAL/RHEUMATOLOGIC  Arthralgias: yes  Myalgias: yes  ENDOCRINE:  Monitoring Blood Sugars: no  Sugars Well Controlled: yes  DERMATOLOGIC:  Rashes: no    PHYSICAL EXAM:  Vitals: /70  Pulse 78  Resp 18  Ht 5' 4\" (1.626 m)  Wt (!) 249 lb 8 oz (113.2 kg)  SpO2 98%  Breastfeeding? No  BMI 42.83 kg/m2  Height: 5' 4\" (1.626 m) (5/15/2018  8:01 AM)  Initial Weight: 266.5 lbs (5/15/2018  8:01 AM)  Weight: 249 lb 8 oz (113.2 kg) (5/15/2018  8:01 AM)  Weight loss from initial: 17 (5/15/2018  8:01 AM)  % Weight loss: 6.38 % (5/15/2018  8:01 AM)  BMI (Calculated): 42.8 (5/15/2018  8:01 AM)  SpO2: 98 % (5/15/2018  8:01 AM)    GEN: Pleasant, well groomed, in no acute distress  EYES: EOMI,  ENT: airway patent  NECK: no carotid bruits, no anterior/supraclavicular lymphadenopathy, thyroid normal   HEART: Rhythm regular, rate regular, no murmur   LUNGS: " Clear  ABDOMEN: soft, non-tender, obese, no rashes   VASCULAR: trace  lower extremity edema  MUSCULOSKELETAL:  muscle mass OK for age  SKIN:  no color changes of venous stasis, no ulcerations    Time spent with patients 30 minutes, >50% in counseling and coordination of care.

## 2021-06-19 NOTE — PROGRESS NOTES
Non-surgical Weight Loss Follow Up Diet Evaluation    Assessment:  This patient is a 28 y.o. female is being seen today for follow-up non-surgical nutritional evaluation. Today we reviewed the patients current eating habits and level of physical activity, and instructed on the changes that are required for successful weight loss outcomes.    Pt's Initial Weight: 266.5 lbs  Weight: 243 lb 8 oz (110.5 kg)  Weight loss from initial: 23  % Weight loss: 8.63 %  BMI: Body mass index is 41.8 kg/(m^2).  IBW: 120 lbs    Personal goal weight:  150-160 lbs      Pt Active Problem List Diagnosis:     Patient Active Problem List   Diagnosis     Morbid obesity (H)     Intermittent asthma     Herpes Simplex     Abnormal Pap smear of cervix     Adjustment disorder with depressed mood     Low back pain     Depression     Anemia     Fatigue     Acanthosis nigricans     Hirsutism     Vitamin D deficiency     Estimated RMR ([place]-St Jeor equation): 1915 calories  Protein requirements (.5grams to .9grams per pound IBW, 20-30% of calories, minimum of 60-80gm per day):   grams    Progress made since last visit: Babysitting 6 month old nephew, which reports has been increased stress. Pt reports taking her weight loss medication and reports reducing her portion sizes.     Concerns: Grazing between meals, not always staying consistent with exercise routine d/t fatigue/napping.     Diet Recall/Time:   Breakfast: greek protein smoothie and slimfast shake (30g)  Am Snack: banana or pretzels  Lunch: wheat sandwich with turkey, carrots (20g)   Pm snack: none   Dinner: mashed potatoes, vegetables, steak (20g)   HS Snack: none     Protein: 70 grams    Meals per week away from home: 0-1     Recommended limiting eating out to no more than 2x/week.  Patient and I reviewed the importance of eating three consistent meals per day; as well as meal timing to be spaced 4-5 hours apart.  Snack choices: 100-150 calories (1-2x/day if physically hungry),  incorporating a fruit/vegetable w/ protein source.    Meal Duration: 15 minutes    Portion Sizes problematic? NO per patient/diet recall  Encouraged slowing meal times down, 20-30 minutes, chewing to applesauce consistency.   To aid in proper portion control and slow meal time down discussed consuming meals off smaller plates, use toddler/children utensils and set utensils down after each bite.    Protein, vegetables/fruits, carbohydrates:   The patient and I discussed the importance of including lean/low fat protein at each meal and limiting carbohydrate intake to less than 25% of plate volume.       Vitamins/Mineral Supplementation: vitamin D gummies two times a day.     Beverages (Type/Oz. per day)  Water: 60 oz   Coffee: none   Tea: none   Milk: none   Regular soda: none   Diet soda: none   Juice: none   Ta-Aid/lemonade/etc: none   Alcohol: none     Discussed the importance of adequate hydration and the goal of 64+ oz of fluid daily.   The patient understands the importance of  avoiding all sweetened and alcoholic drinks, and instead choosing 64 oz plain water.    Exercise  Pt tries to go to the gym 4x/week, however, pt reports easy to fall out routine.     Pt's understands that 45-60 minutes of daily activity is an important part of weight loss success.   Encouraged pt to incorporate  strength training exercise in addition to cardiovascular exercise most days of the week.    PES statement:     1.   (NI-1.3)Excessive energy intake related to Food and nutrition related knowledge deficit concerning excessive energy/oral intake as evidenced by Intake of high caloric density foods  at meals and/or snacks; frequent grazing; Estimated intake that exceeds estimated daily energy intake;  and BMI 41.     Intervention:  Discussion:  1. Educated pt on Eat Better, Move More, Live Well: Non-surgical Weight Loss Handout  2. Discussed the benefits of exercise, encouraged looking into getting a .   3. Reviewed  Plate Method   4. Plate Method: The patient and I discussed the importance of including lean/low  fat protein at each meal and limiting carbohydrate intake to less  than 25% of plate volume.    Instructions/Goals:   1. Include 20-30 gm protein at each meal.  2. Increase vegetable/fruit intake, by having a vegetable or fruit with each meal daily. Recommended pt to increase vegetable/fruit intake to 4-5 servings daily.  3. Increase fluid intake to 64oz daily: choose plain or calorie/alcohol-free beverages.  4. Incorporate daily structured activity, 45-60 minutes most days of the week  5. Read food labels more consistently: keeping total fat grams <10, total sugar grams <10, fiber >3gm per serving.   6. Practice plate method: 1/2 plate lean/low fat protein source, vegetable/fruit, <25% of plate complex carbohydrates.  7. Carbohydrates from grain sources at meal times to be no more than 1 Carb Choice, ie: 15-20 gm total carbohydrate per serving  8. Practice eating off of smaller plates/bowls, chewing to applesauce consistency, taking 20-30 minutes to eat in a calm/relaxed environment without distractions of tv/email/cell phone.    Monitor/Evaluation:    Pt will f/u in one month with bariatrician, and f/u in two months with RD.    Plan for next visit with RD:    Review plate method and goals   Exercise      Time In: 11:00am  Time Out: 11:15am      ABN signed: Yes

## 2021-06-19 NOTE — LETTER
Letter by Gail Glasgow PA-C at      Author: Gail Glasgow PA-C Service: -- Author Type: --    Filed:  Encounter Date: 4/18/2019 Status: (Other)         April 18, 2019     Patient: Russell Gomez   YOB: 1990   Date of Visit: 4/18/2019       To Whom it May Concern:    Russell Gomez was seen in my clinic on 4/18/2019.  Please excuse her from work from 4/18/19 through 4/30/19.    If you have any questions or concerns, please don't hesitate to call.    Sincerely,         Electronically signed by Gail Glasgow PA-C

## 2021-06-20 NOTE — PROGRESS NOTES
Non-surgical Weight Loss Follow Up Diet Evaluation    Assessment:  This patient is a 28 y.o. female is being seen today for follow-up non-surgical nutritional evaluation. Today we reviewed the patients current eating habits and level of physical activity, and instructed on the changes that are required for successful weight loss outcomes.    Phentermine: not discussed    Pt's Initial Weight: 266.5 lbs  Weight: 250 lb (113.4 kg)  Weight loss from initial: 16.5  % Weight loss: 6.19 %  BMI: Body mass index is 42.91 kg/(m^2).  IBW: 120 lbs    Personal goal weight: 150-160 lb      Pt Active Problem List Diagnosis:     Patient Active Problem List   Diagnosis     Morbid obesity (H)     Intermittent asthma     Herpes Simplex     Abnormal Pap smear of cervix     Adjustment disorder with depressed mood     Low back pain     Depression     Anemia     Fatigue     Acanthosis nigricans     Hirsutism     Vitamin D deficiency       Estimated RMR (Wall-St Jeor equation): 1851 calories  Protein requirements (.5grams to .9grams per pound IBW, 20-30% of calories, minimum of 60-80gm per day):   grams    Progress made since last visit: Pt reports her stress levels have increased have to watch her nephew, which she reports has led to poor sleep, stress eating, and large portion sizes. Pt states using the protein supplements    Concerns: Poor sleep, snacking between meals, inconsistent meal schedule.       Diet Recall/Time:   Breakfast: protein bar (10g)   Am Snack: protein shake, banana  (20g)   Lunch: 2 hot dog on bun with chips (15g)   Pm snack: none (pt takes nap)   Dinner: sloppy eric's on bun (10g)   HS Snack: none   12-1am wakes up and eats- reports going to [employer] on one occasion.     3-4 hours of sleep when taking care of 8 month old nephew.     Protein: 50-60 grams    Meals per week away from home: 1-2x/week     Recommended limiting eating out to no more than 2x/week.  Patient and I reviewed the importance of eating  three consistent meals per day; as well as meal timing to be spaced 4-5 hours apart.  Snack choices: 100-150 calories (1-2x/day if physically hungry), incorporating a fruit/vegetable w/ protein source.    Meal Duration: Not discussed     Portion Sizes problematic? YES per patient/diet recall  Encouraged slowing meal times down, 20-30 minutes, chewing to applesauce consistency.   To aid in proper portion control and slow meal time down discussed consuming meals off smaller plates, use toddler/children utensils and set utensils down after each bite.    Protein, vegetables/fruits, carbohydrates:   The patient and I discussed the importance of including lean/low fat protein at each meal and limiting carbohydrate intake to less than 25% of plate volume.       Vitamins/Mineral Supplementation: MVI,    Beverages (Type/Oz. per day)  Water: 64 oz   Coffee: none   Tea: none   Milk: none   Regular soda: 1 can   Diet soda: none   Juice: none   Ta-Aid/lemonade/etc: none   Alcohol: none     Discussed the importance of adequate hydration and the goal of 64+ oz of fluid daily.   The patient understands the importance of  avoiding all sweetened and alcoholic drinks, and instead choosing 64 oz plain water.    Exercise  Goes to the gym 1-2x/week     Pt's understands that 45-60 minutes of daily activity is an important part of weight loss success.   Encouraged pt to incorporate  strength training exercise in addition to cardiovascular exercise most days of the week.    PES statement:     1. (NI-1.3)Excessive energy intake related to Food and nutrition related knowledge deficit concerning excessive energy/oral intake as evidenced by Intake of high caloric density foodsat meals and/or snacks; large portion; frequent grazing; Estimated intake that exceeds estimated daily energy intake; Binge eating patterns; and BMI 42.         Intervention:  Discussion:  1. Educated pt on meal planning   2. Reviewed lean protein sources.  20-30 gm  protein at 3 meals daily. 60-80 grams daily total.  3. Carbohydrate Countin carbohydrate choice/serving = 15-20gm total carbohydrate   4. Reviewed Plate Method and gave food journal homework.  5. Gave pt wrote out meal plan   6. Plate Method: The patient and I discussed the importance of including lean/low  fat protein at each meal and limiting carbohydrate intake to less  than 25% of plate volume.    Instructions/Goals:   1. Include 20-30 gm protein at each meal.  2. Increase vegetable/fruit intake, by having a vegetable or fruit with each meal daily. Recommended pt to increase vegetable/fruit intake to 4-5 servings daily.  3. Increase fluid intake to 64oz daily: choose plain or calorie/alcohol-free beverages.  4. Incorporate daily structured activity, 45-60 minutes most days of the week  5. Read food labels more consistently: keeping total fat grams <10, total sugar grams <10, fiber >3gm per serving.   6. Practice plate method: 1/2 plate lean/low fat protein source, vegetable/fruit, <25% of plate complex carbohydrates.  7. Carbohydrates from grain sources at meal times to be no more than 1 Carb Choice, ie: 15-20 gm total carbohydrate per serving  8. Practice eating off of smaller plates/bowls, chewing to applesauce consistency, taking 20-30 minutes to eat in a calm/relaxed environment without distractions of tv/email/cell phone.    Handouts Provided:  Meal plan     Monitor/Evaluation:    Pt will f/u in one month with bariatrician, and f/u in two months with RD.    Plan for next visit with RD:    Review plate method and meal plan   Educate pt on healthy dinner meals  Review carbohydrates/fiber  Exercise      Time In: 11:30am  Time Out: 12:00pm      ABN signed: Yes

## 2021-06-21 NOTE — LETTER
Letter by Janine Butler MD at      Author: Janine Butler MD Service: -- Author Type: --    Filed:  Encounter Date: 3/5/2021 Status: (Other)         March 5, 2021     Patient: Russell Gomez   YOB: 1990   Date of Visit: 3/5/2021       To Whom it May Concern:    Russell Gomez was seen in my clinic on 3/5/2021.  She is pregnant and should not lift more than 20 pounds during the rest of her pregnancy.    If you have any questions or concerns, please don't hesitate to call.    Sincerely,         Electronically signed by Janine Butler MD

## 2021-06-22 NOTE — PROGRESS NOTES
"  Subjective:     Russell Gomez is a 28 y.o. female who presents for an annual exam.     Other concerns today:  1.  STD screen.  She found out that her boyfriend had been seeing other women.  She is not having any symptoms of STDs, but would like screening today.  She notes that they use condoms \"most of the time.\"  No pain with urination, no vaginal discharge.  She notes some occasional sharp abdominal pains.  She notes that normally she has abdominal pains like this before she gets her period.  However she is not yet due for her period.    She notes poor vision at night.  She saw an eye doctor previously and they gave her contact lenses.  She never actually use these.  I encouraged patient to follow-up with ophthalmology.  She is not using anything for birth control.  She is thinking she wants to become pregnant within the next year.  She says she wants to have all of her children by age 30.  Her boyfriend is older, already has children, and does not want any more children.  She is not sure what she is going to do about that situation.    Complete current, past, surgical, family medical history reviewed and entered in chart.    Immunization History   Administered Date(s) Administered     DTP 1990, 1990, 1990, 1991, 1994     Hep B, Peds, Historic 1996, 1997, 1999, 10/24/2000     HiB, historic,unspecified 1990, 1991     IPV 1990, 1990, 1991, 1994     Influenza, inj, historic,unspecified 09/15/2016     MMR 07/15/1991, 1997, 1998     Td,adult,historic,unspecified 2002, 2012     Varicella 1999     Gynecologic History  Patient's last menstrual period was 2018.  Contraception: none  Last Pap: 2017  Last mammogram: n/a    OB History    Para Term  AB Living   3 1 0 1 2 1   SAB TAB Ectopic Multiple Live Births   0 0 0 0 1      # Outcome Date GA Lbr Gagan/2nd Weight Sex Delivery Anes PTL Lv "   3 AB            2 AB            1      M Vag-Spont   EDEN            Current Outpatient Medications:      cholecalciferol, vitamin D3, 2,000 unit Chew, Chew 1 tablet daily., Disp: , Rfl:      naltrexone (DEPADE) 50 mg tablet, Take 1/2 tablet with the evening meal for one week then take 1/2 tablet with breakfast and with the evening meal, Disp: 90 tablet, Rfl: prn     phentermine (ADIPEX-P) 37.5 mg tablet, TAKE ONE TABLET BY MOUTH ONCE DAILY BEFORE BREAKFAST (TAKE  ONE-HALF  TO  ONE  TABLET  IN  THE  MORNING), Disp: 90 tablet, Rfl: 1     albuterol (PROAIR HFA) 90 mcg/actuation inhaler, Inhale 1-2 puffs every 4 (four) hours as needed for wheezing., Disp: 1 Inhaler, Rfl: 1  Past Medical History:   Diagnosis Date     Acanthosis nigricans      Accessory skin tags      Acute Gonorrhea     Created by Conversion      Anemia      Heartburn      Hirsutism      Low back pain      Menorrhagia      Rotator Cuff Tendonitis     Created by Conversion      Past Surgical History:   Procedure Laterality Date     TONSILLECTOMY       Penicillins  Family History   Problem Relation Age of Onset     Hypertension Mother      Asthma Mother      Obesity Sister      Diabetes Paternal Aunt      Obesity Paternal Aunt      Diabetes Paternal Grandfather      Obesity Maternal Aunt      Arthritis Maternal Grandmother      Stroke Paternal Grandmother      Arthritis Paternal Grandmother      Social History     Socioeconomic History     Marital status: Single     Spouse name: Not on file     Number of children: Not on file     Years of education: Not on file     Highest education level: Not on file   Social Needs     Financial resource strain: Not on file     Food insecurity - worry: Not on file     Food insecurity - inability: Not on file     Transportation needs - medical: Not on file     Transportation needs - non-medical: Not on file   Occupational History     Not on file   Tobacco Use     Smoking status: Never Smoker     Smokeless tobacco:  Never Used   Substance and Sexual Activity     Alcohol use: No     Drug use: No     Sexual activity: Yes     Partners: Male     Birth control/protection: Condom   Other Topics Concern     Not on file   Social History Narrative    Single, one son, Son's father is in [place] and not involved    Boyfriend is supportive    Close with her father who lives in [place]    Not close with her mother who lives here        Review of Systems  Complete Review of Systems is discussed with patient and is negative except as noted in HPI.    Objective:     Vitals:    01/08/19 0906   BP: 112/80   Pulse: (!) 103   Resp: 20   SpO2: 99%     Body mass index is 41.5 kg/m .    Physical Exam:  General: Patient is alert and Oriented x 3, in no apparent distress.  HEENT, Thyroid, Lymphatic, Cardiac, Pulmonary, GI, Musculoskeletal, and Neuro exams were completed today and grossly normal.  Breast Exam: No lumps, skin changes, lymphadenopathy, or nipple discharge noted bilaterally.  Genitourinary Exam: External genitalia is normal in appearance, vaginal walls are healthy and without lesions, no significant discharge noted in the vaginal vault, cervix is well visualized and normal in appearance.    Results for orders placed or performed in visit on 01/08/19   Wet Prep, Vaginal   Result Value Ref Range    Yeast Result No yeast seen No yeast seen    Trichomonas No Trichomonas seen No Trichomonas seen    Clue Cells, Wet Prep No Clue cells seen No Clue cells seen   HIV Antigen/Antibody Screening [place]   Result Value Ref Range    HIV Antigen / Antibody Negative Negative   Glycosylated Hemoglobin A1c   Result Value Ref Range    Hemoglobin A1c 5.2 3.5 - 6.0 %   Other labs pending.    Assessment and Plan:     1. Annual physical exam  Health Maintenance discussed with patient as appropriate for age and risk factors.  She declines offer of contraception today.  She declines flu shot.  She believes she is up-to-date on her tetanus shot.  - Glycosylated  Hemoglobin A1c  - Vitamin D, Total (25-Hydroxy)    2. Morbid obesity (H)  Following with bariatric center.    She does not think she has had any screening labs through bariatric surgery.  She is taking phentermine and naltrexone for weight loss through the bariatric center.  I reviewed with her that she would not want to get pregnant while taking either of these medications.  I discussed that is why I would recommend reliable form of birth control.  She declines birth control today.  She will review this with her bariatric doctor at their next visit.  Thyroid hormone level normal in 2016.  Screening vitamin D, A1c ordered today.  Results are pending.  - Glycosylated Hemoglobin A1c    3. Vitamin D deficiency  Lab ordered, I will follow-up with results.  - Vitamin D, Total (25-Hydroxy)    4. Mild intermittent asthma without complication  Well-controlled on present medications.    5. Screen for STD (sexually transmitted disease)  I will follow-up with pending results.  I recommended regular condom use.  - Wet Prep, Vaginal  - Chlamydia trachomatis & Neisseria gonorrhoeae, Amplified Detection  - HIV Antigen/Antibody Screening [place]  - Syphilis Screen, [place]    The following high BMI interventions were performed this visit: prescribed diet education following with bariatric care    This dictation uses voice recognition software, which may contain typographical errors.

## 2021-06-23 NOTE — TELEPHONE ENCOUNTER
"Triage call:   Patient calling to report a positive pregnancy test after vomiting. Patient reports that she has taken appetite suppressants the past two days and is worried about harming the fetus. LMP: Dec 18, last office visit was Jan 8, 2018. Patient is very anxious about this and stated \"I'm panicking, my son was premature and I need to know my risk factors. I haven't been pregnant in 7 years.\"     Patient requesting to have a message sent to Gail Glasgow as well as she has seen her in the past.     Triaged to be seen today but patient wants to be seen right now. Instructed her that if she feels she needs to be seen now that she can go to the ER/urgent care. Patient states that she is familiar with where [facility] is and is already driving there now.     Patient states that after she has her positive test in the ER that she will call back to set up OB.     Sonali Martinez RN BSBA [facility] Triage/Med Refill 1/18/2019 10:43 AM    Reason for Disposition    Patient wants to be seen    Protocols used: MENSTRUAL PERIOD - MISSED OR LATE-A-OH      "

## 2021-06-23 NOTE — PROGRESS NOTES
Subjective:      Russell Gomez is a 28 y.o. female who presents for evaluation of positive pregnancy test.  She is taking phentermine and naltrexone to aid in weight loss.  I saw her for a visit about 2 weeks ago.  She was not using anything for birth control at that time.  She was unsure if she wanted to become pregnant.  We specifically reviewed that these 2 medicines would be contraindicated if she was trying to become pregnant, or if she were pregnant.  I encouraged her to start birth control but she declined.  A few days ago she was not feeling right, and did a pregnancy test at home on 1/18/2019.  It was positive.  She then went into the emergency room for evaluation.  She was quite upset, as she had been possibly taking the phentermine and naltrexone when she was pregnant.  She notes her other child was born at about 31 weeks gestation.  Patient's last menstrual period was 12/18/2018 (exact date).  That would put due date at approximately 9/24/2019 and she would be 4+6 weeks gestation.  With further discussion, she says that she had actually not taken phentermine or naltrexone for about 2 weeks prior to her positive test, because she ran out of them.  She took each of them on the day of her positive test, but threw them up afterwards.    Patient Active Problem List   Diagnosis     Morbid obesity (H)     Intermittent asthma     Herpes Simplex     Abnormal Pap smear of cervix     Adjustment disorder with depressed mood     Low back pain     Depression     Anemia     Fatigue     Acanthosis nigricans     Hirsutism     Vitamin D deficiency       Current Outpatient Medications:      albuterol (PROAIR HFA) 90 mcg/actuation inhaler, Inhale 1-2 puffs every 4 (four) hours as needed for wheezing., Disp: 1 Inhaler, Rfl: 1     cholecalciferol, vitamin D3, 2,000 unit Chew, Chew 1 tablet daily., Disp: , Rfl:      prenatal vit 14-iron fum-folic 29 mg iron- 1 mg Chew, Chew 1 tablet., Disp: , Rfl:      Objective:      Allergies:  Blood-group specific substance and Penicillins    Vitals:  Vitals:    01/21/19 1456   BP: 124/70   Pulse: 80   Resp: 18     Body mass index is 41.69 kg/m .    Vital signs reviewed.  General: Patient is alert and oriented x 3, in no apparent distress, appropriately groomed with normal affect    Results for orders placed or performed in visit on 01/21/19   Urinalysis   Result Value Ref Range    Color, UA Yellow Colorless, Yellow, Straw, Light Yellow    Clarity, UA Clear Clear    Glucose, UA Negative Negative    Bilirubin, UA Negative Negative    Ketones, UA Negative Negative    Specific Gravity, UA >=1.030 1.005 - 1.030    Blood, UA Negative Negative    pH, UA 5.5 5.0 - 8.0    Protein, UA Negative Negative mg/dL    Urobilinogen, UA 0.2 E.U./dL 0.2 E.U./dL, 1.0 E.U./dL    Nitrite, UA Negative Negative    Leukocytes, UA Negative Negative   Pregnancy, Urine   Result Value Ref Range    Pregnancy Test, Urine Positive (!) Negative       Assessment and Plan:   1.  Positive pregnancy test.  Patient would like to deliver at [facility].  She has seen OB/GYN specialists in the past.  Referral made for her to them today.  Prescription sent for chewable prenatal vitamin.  She is not taking any other medications, other than albuterol if she needs it.  Recommended she follow-up with OB/GYN in 4-6 weeks.  She declined flu shot today.    2.  Possibly taking high risk medications during early pregnancy.  If dates are correct, she would have conceived on approximately January 1.  It is possible she could have been taking phentermine and naltrexone for several days right around the time of conception.  She also took 1 of each of these pills on the day of her positive test, but threw them up afterwards.  I reviewed that phentermine is category X.  Neither of these medicines has had a lot of researched on during pregnancy.  Because she only took these medicines for a very short period of time, it is less likely that  they would cause any problems for developing pregnancy.  I reviewed that she could see a genetic counselor to review her risks.  She declines that for now.  She will see OB/GYN first, and then may see specialist from there.  All of her questions are answered today.    This dictation uses voice recognition software, which may contain typographical errors.

## 2021-07-03 NOTE — ADDENDUM NOTE
Addendum Note by Alley Irizarry, RN at 9/25/2020 12:15 PM     Author: Alley Irizarry RN Service: -- Author Type: Registered Nurse    Filed: 10/2/2020  9:43 AM Encounter Date: 9/25/2020 Status: Signed    : Alley Irizarry RN (Registered Nurse)    Addended by: ALLEY IRIZARRY on: 10/2/2020 09:43 AM        Modules accepted: Orders

## 2021-07-03 NOTE — ADDENDUM NOTE
Addendum Note by Kamron Glasgow PA-C at 7/16/2020 10:20 AM     Author: Kamron Glasgow PA-C Service: -- Author Type: Physician Assistant    Filed: 7/17/2020  1:21 PM Encounter Date: 7/16/2020 Status: Signed    : Kamron Glasgow PA-C (Physician Assistant)    Addended by: KAMRON GLASGOW on: 7/17/2020 01:21 PM        Modules accepted: Orders

## 2021-07-04 NOTE — ADDENDUM NOTE
Addendum Note by Robbie Jansen RN at 4/28/2021 12:39 PM     Author: Robbie Jansen RN Service: -- Author Type: Registered Nurse    Filed: 4/28/2021 12:39 PM Encounter Date: 4/20/2021 Status: Signed    : Robbie Jansen RN (Registered Nurse)    Addended by: ROBBIE JANSEN on: 4/28/2021 12:39 PM        Modules accepted: Orders

## 2021-07-14 PROBLEM — O09.891 HISTORY OF PRETERM DELIVERY, CURRENTLY PREGNANT IN FIRST TRIMESTER: Status: RESOLVED | Noted: 2019-01-22 | Resolved: 2020-07-16

## 2021-07-14 PROBLEM — O24.410 DIET CONTROLLED GESTATIONAL DIABETES MELLITUS (GDM) IN SECOND TRIMESTER: Status: RESOLVED | Noted: 2019-05-01 | Resolved: 2020-07-16

## 2021-07-14 PROBLEM — O30.042 DICHORIONIC DIAMNIOTIC TWIN PREGNANCY IN SECOND TRIMESTER: Status: RESOLVED | Noted: 2019-05-03 | Resolved: 2020-07-16

## 2021-07-14 PROBLEM — O34.32 CERVICAL INSUFFICIENCY DURING PREGNANCY IN SECOND TRIMESTER, ANTEPARTUM: Status: RESOLVED | Noted: 2019-05-03 | Resolved: 2020-07-16

## 2021-07-14 PROBLEM — O21.9 NAUSEA AND VOMITING IN PREGNANCY PRIOR TO 22 WEEKS GESTATION: Status: RESOLVED | Noted: 2019-05-03 | Resolved: 2020-07-16

## 2021-07-14 PROBLEM — Z32.01 PREGNANCY TEST POSITIVE: Status: RESOLVED | Noted: 2019-01-22 | Resolved: 2020-07-16

## 2021-07-14 PROBLEM — B00.9 HSV-2 INFECTION COMPLICATING PREGNANCY, SECOND TRIMESTER: Status: RESOLVED | Noted: 2019-05-03 | Resolved: 2020-07-16

## 2021-07-14 PROBLEM — O98.512 HSV-2 INFECTION COMPLICATING PREGNANCY, SECOND TRIMESTER: Status: RESOLVED | Noted: 2019-05-03 | Resolved: 2020-07-16

## 2021-08-06 ENCOUNTER — VIRTUAL VISIT (OUTPATIENT)
Dept: SURGERY | Facility: CLINIC | Age: 31
End: 2021-08-06
Payer: COMMERCIAL

## 2021-08-06 VITALS — HEIGHT: 64 IN | BODY MASS INDEX: 32.27 KG/M2 | WEIGHT: 189 LBS

## 2021-08-06 DIAGNOSIS — E66.811 OBESITY, CLASS I, BMI 30.0-34.9 (SEE ACTUAL BMI): ICD-10-CM

## 2021-08-06 DIAGNOSIS — Z98.84 BARIATRIC SURGERY STATUS: Primary | ICD-10-CM

## 2021-08-06 DIAGNOSIS — Z71.3 NUTRITIONAL COUNSELING: ICD-10-CM

## 2021-08-06 PROCEDURE — 97803 MED NUTRITION INDIV SUBSEQ: CPT | Mod: 95 | Performed by: DIETITIAN, REGISTERED

## 2021-08-06 ASSESSMENT — MIFFLIN-ST. JEOR: SCORE: 1557.3

## 2021-08-06 NOTE — PROGRESS NOTES
Russell Gomez is a 31 year old who is being evaluated via a billable video visit.      How would you like to obtain your AVS? MyChart  If the video visit is dropped, the invitation should be resent by: Send to e-mail at: [email or web address]  Will anyone else be joining your video visit? No      Video Start Time: 1:30p      Post-op Surgical Weight Loss Diet Evaluation     Assessment:  Pt presents for 10 months post-op RD visit, s/p LSG on 10-27-20 with Dr. Richey. Today we reviewed current eating habits and level of physical activity, and instructed on the changes that are required for successful bariatric outcomes.    Patient Progress: patient has been on bedrest since 4 months pregnant- due date is Oct 30.   +eating veggies, fruit, cheese, peanuts  +craving baking soda, corn starch and flour- suspected Pica- iron or zinc deficiency- patient has visit with OBGYN next week and was encouraged to contact her to get labs done to rule out deficiency    Pt's weight today is 189lb this is up 10lb from pre-pregnancy weight       Recommended Weight Gain During Pregnancy  Prepregnancy BMI 18.5-24.9: 25-35 lbs (11.5-16 kg)  Prepregnancy BMI 25-29.9:15-25 lbs (7-11.5 kg)  Prepregnancy BMI > or = 30: 11-18 lbs (5-9 kg)    Body mass index is 32.44 kg/m .    Patient Active Problem List   Diagnosis     Morbid obesity (H)     Intermittent asthma     Herpes Simplex     Abnormal Pap smear of cervix     Adjustment disorder with depressed mood     Low back pain     Episode of recurrent major depressive disorder (H)     Anemia     Acanthosis nigricans     Hirsutism     Vitamin D deficiency     Rh negative state in antepartum period, second trimester     Pre-syncope     Chlamydia infection     Morbid (severe) obesity due to excess calories (H)     Atypical squamous cell changes of undetermined significance favor dysplasia     Depressive disorder     History of bariatric surgery     History of gestational diabetes mellitus     Human  "papilloma virus infection       Vitamins   Multi Vit with Iron: yes  Calcium Citrate: yes  B12: yes  D3: yes    +testing blood glucose for potential gestational diabetes -7 days as she is unable to tolerate glucose tolerance test    Nausea: no  Vomiting: no  Diarrhea: no  Constipation: no  Hair loss:no    Fluid Intake  Water: drinking a lot of water      PES statement:      (NC-1.4) Altered GI Function related to Alteration in gastrointestinal tract structure and/or function/ Decreased functional length of the GI tract as evidenced by Weight loss of 29% initial body weight; sleeve gastrectomy    Intervention    Discussion  1. Recommended to consume 15-20gm protein at 3 meals daily, along with protein supplement/\"planned protein containing snack\" of 15-30gm protein, to reach goal of 60-80 gm protein daily.  2. Educated on post-op vitamin regimen: Multi Vit + iron 2x/day, calcium citrate 400-600 mg 2x/day, 0489-9001 mcg of Sublingual B-12 daily, and 5000 IU Vitamin D3 daily (MVI and calcium can be taken at the same time BID)    Instructions  1. Include 15-20gm protein at each meal, along with protein supplement/\"planned protein containing snack\" of 15-30gm protein, to reach goal of 60-80 gm protein daily.  2. Increase fluid intake to 64oz daily: choose plain or calorie/carbonation-free beverages.  3. Incorporate daily structured activity, 30-60 minutes most days of the week  4. Recommended pt to start taking: Multi Vit + iron 2x/day, calcium citrate 400-600 mg 2x/day, 8163-1154 mcg of Sublingual B-12 daily, and 5000 IU Vitamin D3 daily. (MVI and calcium can be taken at the same time)  5. Read food labels more consistently: keeping total fat grams <10, total sugar grams <10, fiber >3gm per serving.  6. Increase vegetable/fruit intake, by having a vegetable or fruit with each meal daily.  7. Practice plate method: 1/2 plate lean/low fat protein source, vegetable/fruit, <25% of plate complex carbohydrates.  8. Separate " fluids 30 minutes before/after meal times.  9. Practice eating off of smaller plates/bowls, chewing to applesauce consistency, taking 20-30 minutes to eat in a calm/relaxed environment without distractions of tv/email/cell phone.    Assessment/Plan:    Pt to follow up for 1 year post-op visit with bariatrician       Video-Visit Details    Type of service:  Video Visit    Video End Time:1:54 PM    Originating Location (pt. Location): Home    Distant Location (provider location):  [facility] [place]     Platform used for Video Visit: Reginald CARDENAS RD

## 2021-08-06 NOTE — LETTER
8/6/2021         RE: Russell Gomez  [address]  [place] [address]        Dear Colleague,    Thank you for referring your patient, Russell Gomez, to the [facility] [place]. Please see a copy of my visit note below.    Russell Gomez is a 31 year old who is being evaluated via a billable video visit.      How would you like to obtain your AVS? MyChart  If the video visit is dropped, the invitation should be resent by: Send to e-mail at: [email or web address]  Will anyone else be joining your video visit? No      Video Start Time: 1:30p      Post-op Surgical Weight Loss Diet Evaluation     Assessment:  Pt presents for 10 months post-op RD visit, s/p LSG on 10-27-20 with Dr. Richey. Today we reviewed current eating habits and level of physical activity, and instructed on the changes that are required for successful bariatric outcomes.    Patient Progress: patient has been on bedrest since 4 months pregnant- due date is Oct 30.   +eating veggies, fruit, cheese, peanuts  +craving baking soda, corn starch and flour- suspected Pica- iron or zinc deficiency- patient has visit with OBGYN next week and was encouraged to contact her to get labs done to rule out deficiency    Pt's weight today is 189lb this is up 10lb from pre-pregnancy weight       Recommended Weight Gain During Pregnancy  Prepregnancy BMI 18.5-24.9: 25-35 lbs (11.5-16 kg)  Prepregnancy BMI 25-29.9:15-25 lbs (7-11.5 kg)  Prepregnancy BMI > or = 30: 11-18 lbs (5-9 kg)    Body mass index is 32.44 kg/m .    Patient Active Problem List   Diagnosis     Morbid obesity (H)     Intermittent asthma     Herpes Simplex     Abnormal Pap smear of cervix     Adjustment disorder with depressed mood     Low back pain     Episode of recurrent major depressive disorder (H)     Anemia     Acanthosis nigricans     Hirsutism     Vitamin D deficiency     Rh negative state in antepartum period, second  "trimester     Pre-syncope     Chlamydia infection     Morbid (severe) obesity due to excess calories (H)     Atypical squamous cell changes of undetermined significance favor dysplasia     Depressive disorder     History of bariatric surgery     History of gestational diabetes mellitus     Human papilloma virus infection       Vitamins   Multi Vit with Iron: yes  Calcium Citrate: yes  B12: yes  D3: yes    +testing blood glucose for potential gestational diabetes -7 days as she is unable to tolerate glucose tolerance test    Nausea: no  Vomiting: no  Diarrhea: no  Constipation: no  Hair loss:no    Fluid Intake  Water: drinking a lot of water      PES statement:      (NC-1.4) Altered GI Function related to Alteration in gastrointestinal tract structure and/or function/ Decreased functional length of the GI tract as evidenced by Weight loss of 29% initial body weight; sleeve gastrectomy    Intervention    Discussion  1. Recommended to consume 15-20gm protein at 3 meals daily, along with protein supplement/\"planned protein containing snack\" of 15-30gm protein, to reach goal of 60-80 gm protein daily.  2. Educated on post-op vitamin regimen: Multi Vit + iron 2x/day, calcium citrate 400-600 mg 2x/day, 1644-7578 mcg of Sublingual B-12 daily, and 5000 IU Vitamin D3 daily (MVI and calcium can be taken at the same time BID)    Instructions  1. Include 15-20gm protein at each meal, along with protein supplement/\"planned protein containing snack\" of 15-30gm protein, to reach goal of 60-80 gm protein daily.  2. Increase fluid intake to 64oz daily: choose plain or calorie/carbonation-free beverages.  3. Incorporate daily structured activity, 30-60 minutes most days of the week  4. Recommended pt to start taking: Multi Vit + iron 2x/day, calcium citrate 400-600 mg 2x/day, 7005-2954 mcg of Sublingual B-12 daily, and 5000 IU Vitamin D3 daily. (MVI and calcium can be taken at the same time)  5. Read food labels more consistently: " keeping total fat grams <10, total sugar grams <10, fiber >3gm per serving.  6. Increase vegetable/fruit intake, by having a vegetable or fruit with each meal daily.  7. Practice plate method: 1/2 plate lean/low fat protein source, vegetable/fruit, <25% of plate complex carbohydrates.  8. Separate fluids 30 minutes before/after meal times.  9. Practice eating off of smaller plates/bowls, chewing to applesauce consistency, taking 20-30 minutes to eat in a calm/relaxed environment without distractions of tv/email/cell phone.    Assessment/Plan:    Pt to follow up for 1 year post-op visit with bariatrician       Video-Visit Details    Type of service:  Video Visit    Video End Time:1:54 PM    Originating Location (pt. Location): Home    Distant Location (provider location):  [facility] [place]     Platform used for Video Visit: Reginald CARDENAS RD          Again, thank you for allowing me to participate in the care of your patient.        Sincerely,        MORAIMA CARDENAS RD

## 2021-09-19 ENCOUNTER — HEALTH MAINTENANCE LETTER (OUTPATIENT)
Age: 31
End: 2021-09-19

## 2021-10-07 DIAGNOSIS — Z11.59 ENCOUNTER FOR SCREENING FOR OTHER VIRAL DISEASES: Primary | ICD-10-CM

## 2021-10-08 ENCOUNTER — HOSPITAL ENCOUNTER (EMERGENCY)
Facility: HOSPITAL | Age: 31
Discharge: HOME | End: 2021-10-08
Attending: EMERGENCY MEDICINE | Admitting: EMERGENCY MEDICINE
Payer: COMMERCIAL

## 2021-10-08 ENCOUNTER — LAB (OUTPATIENT)
Dept: LAB | Facility: CLINIC | Age: 31
End: 2021-10-08
Attending: OBSTETRICS & GYNECOLOGY
Payer: COMMERCIAL

## 2021-10-08 VITALS
DIASTOLIC BLOOD PRESSURE: 67 MMHG | BODY MASS INDEX: 33.8 KG/M2 | OXYGEN SATURATION: 99 % | HEIGHT: 64 IN | RESPIRATION RATE: 24 BRPM | SYSTOLIC BLOOD PRESSURE: 101 MMHG | WEIGHT: 198 LBS | HEART RATE: 98 BPM | TEMPERATURE: 98.4 F

## 2021-10-08 DIAGNOSIS — Z11.59 ENCOUNTER FOR SCREENING FOR OTHER VIRAL DISEASES: ICD-10-CM

## 2021-10-08 DIAGNOSIS — R55 VASOVAGAL SYNCOPE: ICD-10-CM

## 2021-10-08 LAB
ALBUMIN SERPL-MCNC: 2.5 G/DL (ref 3.5–5)
ALBUMIN UR-MCNC: 100 MG/DL
ALBUMIN UR-MCNC: NEGATIVE MG/DL
ALP SERPL-CCNC: 120 U/L (ref 45–120)
ALT SERPL W P-5'-P-CCNC: <9 U/L (ref 0–45)
ANION GAP SERPL CALCULATED.3IONS-SCNC: 10 MMOL/L (ref 5–18)
APPEARANCE UR: ABNORMAL
APPEARANCE UR: CLEAR
AST SERPL W P-5'-P-CCNC: 27 U/L (ref 0–40)
BACTERIA #/AREA URNS HPF: ABNORMAL /HPF
BILIRUB SERPL-MCNC: 0.4 MG/DL (ref 0–1)
BILIRUB UR QL STRIP: NEGATIVE
BILIRUB UR QL STRIP: NEGATIVE
BUN SERPL-MCNC: 7 MG/DL (ref 8–22)
CALCIUM SERPL-MCNC: 9.4 MG/DL (ref 8.5–10.5)
CHLORIDE BLD-SCNC: 109 MMOL/L (ref 98–107)
CO2 SERPL-SCNC: 20 MMOL/L (ref 22–31)
COLOR UR AUTO: NORMAL
COLOR UR AUTO: YELLOW
CREAT SERPL-MCNC: 0.7 MG/DL (ref 0.6–1.1)
ERYTHROCYTE [DISTWIDTH] IN BLOOD BY AUTOMATED COUNT: 18.9 % (ref 10–15)
GFR SERPL CREATININE-BSD FRML MDRD: >90 ML/MIN/1.73M2
GLUCOSE BLD-MCNC: 71 MG/DL (ref 70–125)
GLUCOSE UR STRIP-MCNC: NEGATIVE MG/DL
GLUCOSE UR STRIP-MCNC: NEGATIVE MG/DL
HCT VFR BLD AUTO: 30.3 % (ref 35–47)
HGB BLD-MCNC: 8.9 G/DL (ref 11.7–15.7)
HGB UR QL STRIP: ABNORMAL
HGB UR QL STRIP: NEGATIVE
KETONES UR STRIP-MCNC: NEGATIVE MG/DL
KETONES UR STRIP-MCNC: NEGATIVE MG/DL
LEUKOCYTE ESTERASE UR QL STRIP: ABNORMAL
LEUKOCYTE ESTERASE UR QL STRIP: NEGATIVE
MAGNESIUM SERPL-MCNC: 1.7 MG/DL (ref 1.8–2.6)
MCH RBC QN AUTO: 21.8 PG (ref 26.5–33)
MCHC RBC AUTO-ENTMCNC: 29.4 G/DL (ref 31.5–36.5)
MCV RBC AUTO: 74 FL (ref 78–100)
MUCOUS THREADS #/AREA URNS LPF: PRESENT /LPF
NITRATE UR QL: NEGATIVE
NITRATE UR QL: NEGATIVE
PH UR STRIP: 6.5 [PH] (ref 5–7)
PH UR STRIP: 6.5 [PH] (ref 5–7)
PLATELET # BLD AUTO: 153 10E3/UL (ref 150–450)
POTASSIUM BLD-SCNC: 4.2 MMOL/L (ref 3.5–5)
PROT SERPL-MCNC: 6.5 G/DL (ref 6–8)
RBC # BLD AUTO: 4.08 10E6/UL (ref 3.8–5.2)
RBC URINE: 3 /HPF
RBC URINE: <1 /HPF
SARS-COV-2 RNA RESP QL NAA+PROBE: NEGATIVE
SODIUM SERPL-SCNC: 139 MMOL/L (ref 136–145)
SP GR UR STRIP: 1.01 (ref 1–1.03)
SP GR UR STRIP: 1.03 (ref 1–1.03)
SQUAMOUS EPITHELIAL: 14 /HPF
UROBILINOGEN UR STRIP-MCNC: 3 MG/DL
UROBILINOGEN UR STRIP-MCNC: <2 MG/DL
WBC # BLD AUTO: 8 10E3/UL (ref 4–11)
WBC URINE: 2 /HPF
WBC URINE: 53 /HPF

## 2021-10-08 PROCEDURE — 83735 ASSAY OF MAGNESIUM: CPT | Performed by: PHYSICIAN ASSISTANT

## 2021-10-08 PROCEDURE — 93005 ELECTROCARDIOGRAM TRACING: CPT | Performed by: PHYSICIAN ASSISTANT

## 2021-10-08 PROCEDURE — 87086 URINE CULTURE/COLONY COUNT: CPT | Performed by: PHYSICIAN ASSISTANT

## 2021-10-08 PROCEDURE — 81001 URINALYSIS AUTO W/SCOPE: CPT | Performed by: PHYSICIAN ASSISTANT

## 2021-10-08 PROCEDURE — 99284 EMERGENCY DEPT VISIT MOD MDM: CPT | Mod: 25

## 2021-10-08 PROCEDURE — 82040 ASSAY OF SERUM ALBUMIN: CPT | Performed by: PHYSICIAN ASSISTANT

## 2021-10-08 PROCEDURE — U0005 INFEC AGEN DETEC AMPLI PROBE: HCPCS

## 2021-10-08 PROCEDURE — U0003 INFECTIOUS AGENT DETECTION BY NUCLEIC ACID (DNA OR RNA); SEVERE ACUTE RESPIRATORY SYNDROME CORONAVIRUS 2 (SARS-COV-2) (CORONAVIRUS DISEASE [COVID-19]), AMPLIFIED PROBE TECHNIQUE, MAKING USE OF HIGH THROUGHPUT TECHNOLOGIES AS DESCRIBED BY CMS-2020-01-R: HCPCS

## 2021-10-08 PROCEDURE — 36415 COLL VENOUS BLD VENIPUNCTURE: CPT | Performed by: PHYSICIAN ASSISTANT

## 2021-10-08 PROCEDURE — 96360 HYDRATION IV INFUSION INIT: CPT

## 2021-10-08 PROCEDURE — 258N000003 HC RX IP 258 OP 636: Performed by: PHYSICIAN ASSISTANT

## 2021-10-08 PROCEDURE — 85014 HEMATOCRIT: CPT | Performed by: PHYSICIAN ASSISTANT

## 2021-10-08 RX ADMIN — SODIUM CHLORIDE 1000 ML: 9 INJECTION, SOLUTION INTRAVENOUS at 11:51

## 2021-10-08 ASSESSMENT — MIFFLIN-ST. JEOR: SCORE: 1598.12

## 2021-10-08 NOTE — ED PROVIDER NOTES
ED PROVIDER NOTE    EMERGENCY DEPARTMENT ENCOUNTER      NAME: Russell Gomez  AGE: 31 year old female  YOB: 1990  MRN: [id number]  EVALUATION DATE & TIME: 10/8/2021 10:27 AM    PCP: Rito Mills    ED PROVIDER: Aline Neal PA-C      Chief Complaint   Patient presents with     Syncope         FINAL IMPRESSION:  1. Vasovagal syncope          MEDICAL DECISION MAKING:    Pertinent Labs & Imaging studies reviewed. (See chart for details)  31 year old female currently 36w6d pregnant presents to the Emergency Department for evaluation of syncope.  Passed out while sitting in a chair approximately 10 minutes after receiving her second dose of the majority of vaccine today.  She was caught by the pharmacist and her stepdad so there was no trauma.  She had another episode shortly after the first one but it sounds like she was sitting up this whole time.  She currently feels lightheaded but not as bad.  She did have a similar episode a few months ago when getting her blood drawn.  She does still occasionally have some nausea and vomiting and this is overall been a hard pregnancy.  She is due to be induced on Monday.  She can feel baby moving.  No abdominal pain.  No vaginal bleeding.  No chest pain or shortness of breath.    Here vitals are stable, she is afebrile.  Clinically she looks well, nontoxic.  Her neuro exam is completely benign.  Her presentation seems most consistent with a vasovagal syncope related to her injection.  However given her pregnancy, we did elect to check an EKG, basic labs and a urinalysis.    Her lab work was overall unremarkable.  Her hemoglobin is on the lower end of 8.9 however review of records indicate that she was 9.5 on 8/12.  This is likely related to her pregnancy.  She has not had any black or bloody stools or other symptoms of bleeding.  EKG does not show any concerning arrhythmia.    Her initial urinalysis was quite contaminated.  It did have protein and bacteria  but several squamous cells and ultimately I felt it was best management to do a straight cath and get a very clean sample to make sure that she is not spilling protein in the urine or having any signs of infection.    Straight cath was obtained and repeat urine looks much improved.    Patient continued to do well.  Tolerated food and liquids here in the emergency department and was up and ambulated without difficulty or symptoms.  She would like to go home at this time.  Discussed clear return precautions.    At the conclusion of the encounter I discussed the results of all of the tests and the disposition. The questions were answered. The patient or family acknowledged understanding and was agreeable with the care plan.       ED COURSE  11:08 AM  Met and evaluated patient. Discussed ED plan. PPE worn: gloves and surgical mask.  12:46 PM I rechecked on the patient and updated her on lab results.  2:00 PM Updated patient.  Recommended straight cath for repeat urine.    3:40PM I rechecked on the patient and updated her on UA results. We discussed the plan for discharge and the patient is agreeable. Reviewed supportive cares, symptomatic treatment, outpatient follow up, and reasons to return to the Emergency Department. Patient to be discharged by ED RN.         MEDICATIONS GIVEN IN THE EMERGENCY:  Medications   0.9% sodium chloride BOLUS (1,000 mLs Intravenous New Bag 10/8/21 1151)       NEW PRESCRIPTIONS STARTED AT TODAY'S ER VISIT  New Prescriptions    No medications on file          =================================================================    HPI    Patient information was obtained from: Patient    Use of Intrepreter: n/a      Russell Gomez is a 31 year old female with a PMH of asthma, anemia, and gestational diabetes who presents to this ED via EMS for evaluation of syncope.    Patient reports she had a syncopal episode approximately 10 minutes after receiving her second dose of the Moderna COVID-19  vaccine today. Patient states she was sitting in a chair when she began to feel dizzy and warm and lost consciousness. She was caught by the pharmacist and her step-dad. Denies hitting her head. Upon EMS arrival, patient had another syncopal episodes. Patient states with both syncopal episodes she felt dizzy and warm, but did not realize she was going to pass out and only remembers waking up afterwards. Patient currently endorses feeling dizzy and light-headed. She states she has not walking since the syncopal episodes, so is unsure if her gait is normal. Of note, patient previously had a syncopal episode in August when her blood was getting drawn.     Patient notes she is currently 36 weeks and 6 days pregnant. Patient reports she is still having nausea and vomiting as well as a decreased appetite due to these symptoms. She does report that her baby is healthy and is measuring appropriately for her gestational age. Patient's OBGYN is Dr. Amaya. Patient reports this is her fourth pregnancy and she has a history of difficult pregnancies. She states she has one living child and lost twins at 19 weeks.     Patient denies any fevers, chills, headache, vision changes, chest pain, shortness of breath, abdominal pain, numbness, tingling, back pain, neck pain, or other symptoms or concerns at this time.       REVIEW OF SYSTEMS   Constitutional: Negative for fevers, chills. Positive for decreased appetite (secondary to nausea)  Vision: Negative for vision changes  HENT:  Negative for congestion, sore throat, head injury  Pulmonary: Negative for shortness of breath  Cardiac: Negative for chest pain  GI:Negative for diarrhea, abdominal pain. Positive for nausea and vomiting  : Negative for urinary symptoms  Musculoskeletal: Negative for extremity pain, neck pain, back pain  Skin: Negative for skin changes  Neuro: Negative for weakness, numbness, tingling, headache. Positive for dizziness, light-headedness, and syncope  (x2).    All other systems reviewed and are negative      PAST MEDICAL HISTORY:  Past Medical History:   Diagnosis Date     Acanthosis nigricans      Accessory skin tags      Anemia      Asthma      Bacterial vaginosis 2019    3/21/19     Cervical insufficiency during pregnancy in second trimester, antepartum 2019     Chlamydia      Depression      Dichorionic diamniotic twin pregnancy in second trimester 2019     Diet controlled gestational diabetes mellitus (GDM) in second trimester 2019     Gestational diabetes      Gonococcal infection (acute) of lower genitourinary tract     Created by Conversion      Heartburn      Herpes simplex      Hirsutism      History of  delivery, currently pregnant in first trimester 2019     Low back pain      Menorrhagia      Morbid obesity (H)       labor 2011       PAST SURGICAL HISTORY:  Past Surgical History:   Procedure Laterality Date     DC LAP, BREONNA RESTRICT PROC, LONGITUDINAL GASTRECTOMY N/A 10/27/2020    Procedure: GASTRECTOMY, SLEEVE, LAPAROSCOPIC;  Surgeon: Homar Richey MD;  Location: [facility];  Service: General     TONSILLECTOMY             CURRENT MEDICATIONS:    No current facility-administered medications for this encounter.    Current Outpatient Medications:      Prenatal MV & Min w/FA-DHA (PRENATAL ADULT GUMMY/DHA/FA PO), , Disp: , Rfl:     ALLERGIES:  Allergies   Allergen Reactions     Pcn [Penicillins]        FAMILY HISTORY:  Family History   Problem Relation Age of Onset     Hypertension Mother      Asthma Mother      Obesity Sister      Diabetes Paternal Aunt      Obesity Paternal Aunt      Diabetes Paternal Grandfather      Obesity Maternal Aunt      Arthritis Maternal Grandmother      Cerebrovascular Disease Paternal Grandmother      Arthritis Paternal Grandmother      Hypertension Son        SOCIAL HISTORY:   Smoking: Denies  Alcohol: Denies  Illicit drug: Denies    Other: Patient is currently  36 weeks and 6 days pregnant.    VITALS:  Vitals:    10/08/21 1145 10/08/21 1230 10/08/21 1300 10/08/21 1330   BP: 99/58 103/55 100/56 100/55   Pulse: 89 75 81 89   Resp:  26 26    Temp:       TempSrc:       SpO2: 99% 100% 99%    Weight:       Height:           PHYSICAL EXAM    General Appearance:  Alert, cooperative, no distress, appears stated age  HENT: Normocephalic without obvious deformity, atraumatic. Mucous membranes moist   Eyes: Conjunctiva clear, Lids normal. No discharge.   Respiratory: No distress. Lungs clear to ausculation bilaterally. No wheezes, rhonchi or stridor  Cardiovascular: Regular rate and rhythm, no murmur. Normal cap refill. No peripheral edema  GI: Gravid abdomen, nontender, normal bowel sounds  : No CVA tenderness  Musculoskeletal: Moving all extremities. No gross deformities  Integument: Warm, dry, no rashes or lesions  Neurologic: Alert & oriented to person, place and time. Normal tone. PERRL. Normal speech, no dysarthria. CN 2 full visual fields, 3/4/6 EOMI without nystagmus, 5 Sensory intact, 7 Motor intact, face symmetric, 8 Hearing intact, 9,10 11 normal strength, 12 Tongue midline.  Motor: RUE/LUE 5/5  strength and push/pull bilaterally, RLE/LLE 5/5 hip flexion and ankle dorsi/plantar flexion. No pronator drift. Sensory: intact and equal bilaterally  Gait: normal. Psychomotor slowing (-). Abnormal Movements (-).Rapid alternating Movements intact. Finger nose finger intact.   Psych: Normal mood and affect        LAB:  Labs Ordered and Resulted from Time of ED Arrival Up to the Time of Departure from the ED   COMPREHENSIVE METABOLIC PANEL - Abnormal; Notable for the following components:       Result Value    Chloride 109 (*)     Carbon Dioxide (CO2) 20 (*)     Urea Nitrogen 7 (*)     Albumin 2.5 (*)     All other components within normal limits   ROUTINE UA WITH MICROSCOPIC REFLEX TO CULTURE - Abnormal; Notable for the following components:    Appearance Urine Cloudy (*)      Blood Urine 0.06 mg/dL (*)     Protein Albumin Urine 100  (*)     Urobilinogen Urine 3.0 (*)     Leukocyte Esterase Urine 500 Kristi/uL (*)     Bacteria Urine Many (*)     Mucus Urine Present (*)     RBC Urine 3 (*)     WBC Urine 53 (*)     Squamous Epithelials Urine 14 (*)     All other components within normal limits    Narrative:     Urine Culture ordered based on laboratory criteria   MAGNESIUM - Abnormal; Notable for the following components:    Magnesium 1.7 (*)     All other components within normal limits   CBC WITH PLATELETS - Abnormal; Notable for the following components:    Hemoglobin 8.9 (*)     Hematocrit 30.3 (*)     MCV 74 (*)     MCH 21.8 (*)     MCHC 29.4 (*)     RDW 18.9 (*)     All other components within normal limits   ROUTINE UA WITH MICROSCOPIC - Normal   FETAL HEART RATE   CARDIAC CONTINUOUS MONITORING   STRAIGHT CATH FOR URINE   URINE CULTURE       RADIOLOGY:  No orders to display       EKG:    Performed at: 10:57  Impression: Normal sinus rhythm. Normal ECG. Rate 98. QTc 441ms  Dr. Escamilla and I have independently reviewed and interpreted the EKG(s) documented above.        I, Florina Leal, am serving as a scribe to document services personally performed by Aline Neal PA-C based on my observation and the provider's statements to me. I, Aline Neal PA-C attest that Florina Leal is acting in a scribe capacity, has observed my performance of the services and has documented them in accordance with my direction.    Aline Neal PA-C   Emergency Medicine       Aline Neal PA-C  10/08/21 0305

## 2021-10-08 NOTE — ED NOTES
Bed: JNED-11  Expected date: 10/8/21  Expected time: 10:23 AM  Means of arrival: Ambulance  Comments:  STP  37yo  Syncope after covid vacc

## 2021-10-09 LAB
ATRIAL RATE - MUSE: 78 BPM
BACTERIA UR CULT: NO GROWTH
DIASTOLIC BLOOD PRESSURE - MUSE: NORMAL MMHG
INTERPRETATION ECG - MUSE: NORMAL
P AXIS - MUSE: 52 DEGREES
PR INTERVAL - MUSE: 184 MS
QRS DURATION - MUSE: 64 MS
QT - MUSE: 368 MS
QTC - MUSE: 419 MS
R AXIS - MUSE: 76 DEGREES
SYSTOLIC BLOOD PRESSURE - MUSE: NORMAL MMHG
T AXIS - MUSE: 27 DEGREES
VENTRICULAR RATE- MUSE: 78 BPM

## 2021-10-11 ENCOUNTER — HOSPITAL ENCOUNTER (INPATIENT)
Facility: HOSPITAL | Age: 31
LOS: 2 days | Discharge: HOME | End: 2021-10-13
Attending: OBSTETRICS & GYNECOLOGY | Admitting: OBSTETRICS & GYNECOLOGY
Payer: COMMERCIAL

## 2021-10-11 ENCOUNTER — ANESTHESIA (OUTPATIENT)
Dept: SURGERY | Facility: HOSPITAL | Age: 31
End: 2021-10-11
Payer: COMMERCIAL

## 2021-10-11 ENCOUNTER — ANESTHESIA EVENT (OUTPATIENT)
Dept: SURGERY | Facility: HOSPITAL | Age: 31
End: 2021-10-11
Payer: COMMERCIAL

## 2021-10-11 DIAGNOSIS — Z87.59 HISTORY OF STILLBIRTH: ICD-10-CM

## 2021-10-11 DIAGNOSIS — Z30.2 ADMISSION FOR STERILIZATION: Primary | ICD-10-CM

## 2021-10-11 DIAGNOSIS — Z90.79 STATUS POST BILATERAL SALPINGECTOMY: ICD-10-CM

## 2021-10-11 LAB
ABO/RH(D): NORMAL
ANTIBODY SCREEN, TUBE: NORMAL
HGB BLD-MCNC: 10.1 G/DL (ref 11.7–15.7)
HOLD SPECIMEN: NORMAL
SPECIMEN EXPIRATION DATE: NORMAL
SPECIMEN EXPIRATION DATE: NORMAL
T PALLIDUM AB SER QL: NEGATIVE

## 2021-10-11 PROCEDURE — 86780 TREPONEMA PALLIDUM: CPT | Performed by: OBSTETRICS & GYNECOLOGY

## 2021-10-11 PROCEDURE — 250N000011 HC RX IP 250 OP 636: Performed by: NURSE ANESTHETIST, CERTIFIED REGISTERED

## 2021-10-11 PROCEDURE — 36415 COLL VENOUS BLD VENIPUNCTURE: CPT | Performed by: OBSTETRICS & GYNECOLOGY

## 2021-10-11 PROCEDURE — 999N000141 HC STATISTIC PRE-PROCEDURE NURSING ASSESSMENT: Performed by: OBSTETRICS & GYNECOLOGY

## 2021-10-11 PROCEDURE — 272N000001 HC OR GENERAL SUPPLY STERILE: Performed by: OBSTETRICS & GYNECOLOGY

## 2021-10-11 PROCEDURE — 3E033VJ INTRODUCTION OF OTHER HORMONE INTO PERIPHERAL VEIN, PERCUTANEOUS APPROACH: ICD-10-PCS | Performed by: OBSTETRICS & GYNECOLOGY

## 2021-10-11 PROCEDURE — 258N000003 HC RX IP 258 OP 636: Performed by: OBSTETRICS & GYNECOLOGY

## 2021-10-11 PROCEDURE — 0UT70ZZ RESECTION OF BILATERAL FALLOPIAN TUBES, OPEN APPROACH: ICD-10-PCS | Performed by: OBSTETRICS & GYNECOLOGY

## 2021-10-11 PROCEDURE — 250N000009 HC RX 250: Performed by: NURSE ANESTHETIST, CERTIFIED REGISTERED

## 2021-10-11 PROCEDURE — 86900 BLOOD TYPING SEROLOGIC ABO: CPT | Performed by: OBSTETRICS & GYNECOLOGY

## 2021-10-11 PROCEDURE — 258N000003 HC RX IP 258 OP 636: Performed by: NURSE ANESTHETIST, CERTIFIED REGISTERED

## 2021-10-11 PROCEDURE — 722N000001 HC LABOR CARE VAGINAL DELIVERY SINGLE

## 2021-10-11 PROCEDURE — 250N000009 HC RX 250: Performed by: OBSTETRICS & GYNECOLOGY

## 2021-10-11 PROCEDURE — 86850 RBC ANTIBODY SCREEN: CPT | Performed by: OBSTETRICS & GYNECOLOGY

## 2021-10-11 PROCEDURE — 85018 HEMOGLOBIN: CPT | Performed by: OBSTETRICS & GYNECOLOGY

## 2021-10-11 PROCEDURE — 0KQM0ZZ REPAIR PERINEUM MUSCLE, OPEN APPROACH: ICD-10-PCS | Performed by: OBSTETRICS & GYNECOLOGY

## 2021-10-11 PROCEDURE — 250N000025 HC SEVOFLURANE, PER MIN: Performed by: OBSTETRICS & GYNECOLOGY

## 2021-10-11 PROCEDURE — 88302 TISSUE EXAM BY PATHOLOGIST: CPT | Mod: TC | Performed by: OBSTETRICS & GYNECOLOGY

## 2021-10-11 PROCEDURE — 250N000011 HC RX IP 250 OP 636: Performed by: ANESTHESIOLOGY

## 2021-10-11 PROCEDURE — 370N000017 HC ANESTHESIA TECHNICAL FEE, PER MIN: Performed by: OBSTETRICS & GYNECOLOGY

## 2021-10-11 PROCEDURE — 250N000011 HC RX IP 250 OP 636: Performed by: OBSTETRICS & GYNECOLOGY

## 2021-10-11 PROCEDURE — 120N000001 HC R&B MED SURG/OB

## 2021-10-11 PROCEDURE — 710N000009 HC RECOVERY PHASE 1, LEVEL 1, PER MIN: Performed by: OBSTETRICS & GYNECOLOGY

## 2021-10-11 PROCEDURE — 250N000013 HC RX MED GY IP 250 OP 250 PS 637: Performed by: OBSTETRICS & GYNECOLOGY

## 2021-10-11 PROCEDURE — 360N000075 HC SURGERY LEVEL 2, PER MIN: Performed by: OBSTETRICS & GYNECOLOGY

## 2021-10-11 PROCEDURE — 10907ZC DRAINAGE OF AMNIOTIC FLUID, THERAPEUTIC FROM PRODUCTS OF CONCEPTION, VIA NATURAL OR ARTIFICIAL OPENING: ICD-10-PCS | Performed by: OBSTETRICS & GYNECOLOGY

## 2021-10-11 RX ORDER — MULTIVIT-MIN/IRON/FOLIC ACID/K 18-600-40
CAPSULE ORAL
COMMUNITY

## 2021-10-11 RX ORDER — FENTANYL CITRATE 50 UG/ML
50-100 INJECTION, SOLUTION INTRAMUSCULAR; INTRAVENOUS
Status: DISCONTINUED | OUTPATIENT
Start: 2021-10-11 | End: 2021-10-11 | Stop reason: HOSPADM

## 2021-10-11 RX ORDER — CITRIC ACID/SODIUM CITRATE 334-500MG
30 SOLUTION, ORAL ORAL ONCE
Status: DISCONTINUED | OUTPATIENT
Start: 2021-10-11 | End: 2021-10-11 | Stop reason: HOSPADM

## 2021-10-11 RX ORDER — DOCUSATE SODIUM 100 MG/1
100 CAPSULE, LIQUID FILLED ORAL DAILY
Status: DISCONTINUED | OUTPATIENT
Start: 2021-10-11 | End: 2021-10-13 | Stop reason: HOSPADM

## 2021-10-11 RX ORDER — PROPOFOL 10 MG/ML
INJECTION, EMULSION INTRAVENOUS PRN
Status: DISCONTINUED | OUTPATIENT
Start: 2021-10-11 | End: 2021-10-11

## 2021-10-11 RX ORDER — OXYCODONE HYDROCHLORIDE 5 MG/1
5 TABLET ORAL EVERY 4 HOURS PRN
Status: DISCONTINUED | OUTPATIENT
Start: 2021-10-11 | End: 2021-10-11 | Stop reason: HOSPADM

## 2021-10-11 RX ORDER — ACETAMINOPHEN 325 MG/1
650 TABLET ORAL EVERY 4 HOURS PRN
Status: DISCONTINUED | OUTPATIENT
Start: 2021-10-11 | End: 2021-10-11 | Stop reason: HOSPADM

## 2021-10-11 RX ORDER — METHYLERGONOVINE MALEATE 0.2 MG/ML
200 INJECTION INTRAVENOUS
Status: DISCONTINUED | OUTPATIENT
Start: 2021-10-11 | End: 2021-10-13 | Stop reason: HOSPADM

## 2021-10-11 RX ORDER — ACETAMINOPHEN 325 MG/1
650 TABLET ORAL EVERY 4 HOURS PRN
Status: DISCONTINUED | OUTPATIENT
Start: 2021-10-11 | End: 2021-10-13 | Stop reason: HOSPADM

## 2021-10-11 RX ORDER — SODIUM CHLORIDE, SODIUM LACTATE, POTASSIUM CHLORIDE, CALCIUM CHLORIDE 600; 310; 30; 20 MG/100ML; MG/100ML; MG/100ML; MG/100ML
INJECTION, SOLUTION INTRAVENOUS CONTINUOUS
Status: DISCONTINUED | OUTPATIENT
Start: 2021-10-11 | End: 2021-10-11 | Stop reason: HOSPADM

## 2021-10-11 RX ORDER — OXYCODONE HYDROCHLORIDE 5 MG/1
5 TABLET ORAL EVERY 4 HOURS PRN
Status: DISCONTINUED | OUTPATIENT
Start: 2021-10-11 | End: 2021-10-13 | Stop reason: HOSPADM

## 2021-10-11 RX ORDER — FENTANYL CITRATE 50 UG/ML
INJECTION, SOLUTION INTRAMUSCULAR; INTRAVENOUS PRN
Status: DISCONTINUED | OUTPATIENT
Start: 2021-10-11 | End: 2021-10-11

## 2021-10-11 RX ORDER — IBUPROFEN 600 MG/1
600 TABLET, FILM COATED ORAL
Status: DISCONTINUED | OUTPATIENT
Start: 2021-10-11 | End: 2021-10-13 | Stop reason: HOSPADM

## 2021-10-11 RX ORDER — MISOPROSTOL 200 UG/1
400 TABLET ORAL
Status: DISCONTINUED | OUTPATIENT
Start: 2021-10-11 | End: 2021-10-11 | Stop reason: HOSPADM

## 2021-10-11 RX ORDER — OXYTOCIN/0.9 % SODIUM CHLORIDE 30/500 ML
340 PLASTIC BAG, INJECTION (ML) INTRAVENOUS CONTINUOUS PRN
Status: COMPLETED | OUTPATIENT
Start: 2021-10-11 | End: 2021-10-11

## 2021-10-11 RX ORDER — SODIUM CHLORIDE 9 MG/ML
INJECTION, SOLUTION INTRAVENOUS CONTINUOUS
Status: DISCONTINUED | OUTPATIENT
Start: 2021-10-11 | End: 2021-10-11 | Stop reason: HOSPADM

## 2021-10-11 RX ORDER — METOCLOPRAMIDE HYDROCHLORIDE 5 MG/ML
10 INJECTION INTRAMUSCULAR; INTRAVENOUS EVERY 6 HOURS PRN
Status: DISCONTINUED | OUTPATIENT
Start: 2021-10-11 | End: 2021-10-11 | Stop reason: HOSPADM

## 2021-10-11 RX ORDER — PROCHLORPERAZINE MALEATE 10 MG
10 TABLET ORAL EVERY 6 HOURS PRN
Status: DISCONTINUED | OUTPATIENT
Start: 2021-10-11 | End: 2021-10-11 | Stop reason: HOSPADM

## 2021-10-11 RX ORDER — MISOPROSTOL 200 UG/1
800 TABLET ORAL
Status: DISCONTINUED | OUTPATIENT
Start: 2021-10-11 | End: 2021-10-13 | Stop reason: HOSPADM

## 2021-10-11 RX ORDER — NALOXONE HYDROCHLORIDE 0.4 MG/ML
0.2 INJECTION, SOLUTION INTRAMUSCULAR; INTRAVENOUS; SUBCUTANEOUS
Status: DISCONTINUED | OUTPATIENT
Start: 2021-10-11 | End: 2021-10-11 | Stop reason: HOSPADM

## 2021-10-11 RX ORDER — LIDOCAINE 40 MG/G
CREAM TOPICAL
Status: DISCONTINUED | OUTPATIENT
Start: 2021-10-11 | End: 2021-10-11 | Stop reason: HOSPADM

## 2021-10-11 RX ORDER — LIDOCAINE HYDROCHLORIDE 20 MG/ML
INJECTION, SOLUTION INFILTRATION; PERINEURAL PRN
Status: DISCONTINUED | OUTPATIENT
Start: 2021-10-11 | End: 2021-10-11

## 2021-10-11 RX ORDER — CARBOPROST TROMETHAMINE 250 UG/ML
250 INJECTION, SOLUTION INTRAMUSCULAR
Status: DISCONTINUED | OUTPATIENT
Start: 2021-10-11 | End: 2021-10-13 | Stop reason: HOSPADM

## 2021-10-11 RX ORDER — MISOPROSTOL 200 UG/1
400 TABLET ORAL
Status: DISCONTINUED | OUTPATIENT
Start: 2021-10-11 | End: 2021-10-13 | Stop reason: HOSPADM

## 2021-10-11 RX ORDER — OXYTOCIN 10 [USP'U]/ML
10 INJECTION, SOLUTION INTRAMUSCULAR; INTRAVENOUS
Status: DISCONTINUED | OUTPATIENT
Start: 2021-10-11 | End: 2021-10-11 | Stop reason: HOSPADM

## 2021-10-11 RX ORDER — NALOXONE HYDROCHLORIDE 0.4 MG/ML
0.4 INJECTION, SOLUTION INTRAMUSCULAR; INTRAVENOUS; SUBCUTANEOUS
Status: DISCONTINUED | OUTPATIENT
Start: 2021-10-11 | End: 2021-10-11 | Stop reason: HOSPADM

## 2021-10-11 RX ORDER — DEXAMETHASONE SODIUM PHOSPHATE 10 MG/ML
INJECTION, SOLUTION INTRAMUSCULAR; INTRAVENOUS PRN
Status: DISCONTINUED | OUTPATIENT
Start: 2021-10-11 | End: 2021-10-11

## 2021-10-11 RX ORDER — OXYTOCIN/0.9 % SODIUM CHLORIDE 30/500 ML
1-24 PLASTIC BAG, INJECTION (ML) INTRAVENOUS CONTINUOUS
Status: DISCONTINUED | OUTPATIENT
Start: 2021-10-11 | End: 2021-10-11 | Stop reason: HOSPADM

## 2021-10-11 RX ORDER — SODIUM CHLORIDE, SODIUM LACTATE, POTASSIUM CHLORIDE, CALCIUM CHLORIDE 600; 310; 30; 20 MG/100ML; MG/100ML; MG/100ML; MG/100ML
INJECTION, SOLUTION INTRAVENOUS CONTINUOUS
Status: DISCONTINUED | OUTPATIENT
Start: 2021-10-11 | End: 2021-10-13 | Stop reason: HOSPADM

## 2021-10-11 RX ORDER — METOCLOPRAMIDE 10 MG/1
10 TABLET ORAL EVERY 6 HOURS PRN
Status: DISCONTINUED | OUTPATIENT
Start: 2021-10-11 | End: 2021-10-11 | Stop reason: HOSPADM

## 2021-10-11 RX ORDER — BISACODYL 10 MG
10 SUPPOSITORY, RECTAL RECTAL DAILY PRN
Status: DISCONTINUED | OUTPATIENT
Start: 2021-10-11 | End: 2021-10-13 | Stop reason: HOSPADM

## 2021-10-11 RX ORDER — OXYTOCIN 10 [USP'U]/ML
10 INJECTION, SOLUTION INTRAMUSCULAR; INTRAVENOUS
Status: DISCONTINUED | OUTPATIENT
Start: 2021-10-11 | End: 2021-10-13 | Stop reason: HOSPADM

## 2021-10-11 RX ORDER — OXYTOCIN/0.9 % SODIUM CHLORIDE 30/500 ML
340 PLASTIC BAG, INJECTION (ML) INTRAVENOUS CONTINUOUS PRN
Status: DISCONTINUED | OUTPATIENT
Start: 2021-10-11 | End: 2021-10-13 | Stop reason: HOSPADM

## 2021-10-11 RX ORDER — ONDANSETRON 2 MG/ML
INJECTION INTRAMUSCULAR; INTRAVENOUS PRN
Status: DISCONTINUED | OUTPATIENT
Start: 2021-10-11 | End: 2021-10-11

## 2021-10-11 RX ORDER — CALCIUM CARBONATE 500(1250)
1 TABLET ORAL 2 TIMES DAILY
COMMUNITY

## 2021-10-11 RX ORDER — KETOROLAC TROMETHAMINE 30 MG/ML
30 INJECTION, SOLUTION INTRAMUSCULAR; INTRAVENOUS
Status: DISCONTINUED | OUTPATIENT
Start: 2021-10-11 | End: 2021-10-13 | Stop reason: HOSPADM

## 2021-10-11 RX ORDER — GLYCOPYRROLATE 0.2 MG/ML
INJECTION, SOLUTION INTRAMUSCULAR; INTRAVENOUS PRN
Status: DISCONTINUED | OUTPATIENT
Start: 2021-10-11 | End: 2021-10-11

## 2021-10-11 RX ORDER — HYDROMORPHONE HCL IN WATER/PF 6 MG/30 ML
0.2 PATIENT CONTROLLED ANALGESIA SYRINGE INTRAVENOUS EVERY 5 MIN PRN
Status: DISCONTINUED | OUTPATIENT
Start: 2021-10-11 | End: 2021-10-11 | Stop reason: HOSPADM

## 2021-10-11 RX ORDER — OXYTOCIN/0.9 % SODIUM CHLORIDE 30/500 ML
100-340 PLASTIC BAG, INJECTION (ML) INTRAVENOUS CONTINUOUS PRN
Status: DISCONTINUED | OUTPATIENT
Start: 2021-10-11 | End: 2021-10-13 | Stop reason: HOSPADM

## 2021-10-11 RX ORDER — HYDROCORTISONE 2.5 %
CREAM (GRAM) TOPICAL 3 TIMES DAILY PRN
Status: DISCONTINUED | OUTPATIENT
Start: 2021-10-11 | End: 2021-10-13 | Stop reason: HOSPADM

## 2021-10-11 RX ORDER — FENTANYL CITRATE 50 UG/ML
25 INJECTION, SOLUTION INTRAMUSCULAR; INTRAVENOUS EVERY 5 MIN PRN
Status: DISCONTINUED | OUTPATIENT
Start: 2021-10-11 | End: 2021-10-11 | Stop reason: HOSPADM

## 2021-10-11 RX ORDER — MISOPROSTOL 200 UG/1
800 TABLET ORAL
Status: DISCONTINUED | OUTPATIENT
Start: 2021-10-11 | End: 2021-10-11 | Stop reason: HOSPADM

## 2021-10-11 RX ORDER — ONDANSETRON 2 MG/ML
4 INJECTION INTRAMUSCULAR; INTRAVENOUS EVERY 30 MIN PRN
Status: DISCONTINUED | OUTPATIENT
Start: 2021-10-11 | End: 2021-10-11 | Stop reason: HOSPADM

## 2021-10-11 RX ORDER — ONDANSETRON 4 MG/1
4 TABLET, ORALLY DISINTEGRATING ORAL EVERY 30 MIN PRN
Status: DISCONTINUED | OUTPATIENT
Start: 2021-10-11 | End: 2021-10-11 | Stop reason: HOSPADM

## 2021-10-11 RX ORDER — PROCHLORPERAZINE 25 MG
25 SUPPOSITORY, RECTAL RECTAL EVERY 12 HOURS PRN
Status: DISCONTINUED | OUTPATIENT
Start: 2021-10-11 | End: 2021-10-11 | Stop reason: HOSPADM

## 2021-10-11 RX ORDER — MAGNESIUM 200 MG
1000 TABLET ORAL DAILY
COMMUNITY

## 2021-10-11 RX ORDER — METHYLERGONOVINE MALEATE 0.2 MG/ML
200 INJECTION INTRAVENOUS
Status: DISCONTINUED | OUTPATIENT
Start: 2021-10-11 | End: 2021-10-11 | Stop reason: HOSPADM

## 2021-10-11 RX ORDER — LIDOCAINE 40 MG/G
CREAM TOPICAL
Status: DISCONTINUED | OUTPATIENT
Start: 2021-10-11 | End: 2021-10-11

## 2021-10-11 RX ORDER — ONDANSETRON 4 MG/1
4 TABLET, ORALLY DISINTEGRATING ORAL EVERY 6 HOURS PRN
Status: DISCONTINUED | OUTPATIENT
Start: 2021-10-11 | End: 2021-10-11 | Stop reason: HOSPADM

## 2021-10-11 RX ORDER — MODIFIED LANOLIN
OINTMENT (GRAM) TOPICAL
Status: DISCONTINUED | OUTPATIENT
Start: 2021-10-11 | End: 2021-10-13 | Stop reason: HOSPADM

## 2021-10-11 RX ORDER — TERBUTALINE SULFATE 1 MG/ML
0.25 INJECTION, SOLUTION SUBCUTANEOUS
Status: DISCONTINUED | OUTPATIENT
Start: 2021-10-11 | End: 2021-10-11 | Stop reason: HOSPADM

## 2021-10-11 RX ORDER — CARBOPROST TROMETHAMINE 250 UG/ML
250 INJECTION, SOLUTION INTRAMUSCULAR
Status: DISCONTINUED | OUTPATIENT
Start: 2021-10-11 | End: 2021-10-11 | Stop reason: HOSPADM

## 2021-10-11 RX ORDER — BUPIVACAINE HYDROCHLORIDE 2.5 MG/ML
INJECTION, SOLUTION INFILTRATION; PERINEURAL PRN
Status: DISCONTINUED | OUTPATIENT
Start: 2021-10-11 | End: 2021-10-11 | Stop reason: HOSPADM

## 2021-10-11 RX ORDER — ONDANSETRON 2 MG/ML
4 INJECTION INTRAMUSCULAR; INTRAVENOUS EVERY 6 HOURS PRN
Status: DISCONTINUED | OUTPATIENT
Start: 2021-10-11 | End: 2021-10-11 | Stop reason: HOSPADM

## 2021-10-11 RX ADMIN — SODIUM CHLORIDE, POTASSIUM CHLORIDE, SODIUM LACTATE AND CALCIUM CHLORIDE: 600; 310; 30; 20 INJECTION, SOLUTION INTRAVENOUS at 18:03

## 2021-10-11 RX ADMIN — ONDANSETRON 4 MG: 2 INJECTION INTRAMUSCULAR; INTRAVENOUS at 19:03

## 2021-10-11 RX ADMIN — DEXAMETHASONE SODIUM PHOSPHATE 10 MG: 10 INJECTION, SOLUTION INTRAMUSCULAR; INTRAVENOUS at 18:47

## 2021-10-11 RX ADMIN — SODIUM CHLORIDE 250 ML: 9 INJECTION, SOLUTION INTRAVENOUS at 13:35

## 2021-10-11 RX ADMIN — FENTANYL CITRATE 25 MCG: 50 INJECTION INTRAMUSCULAR; INTRAVENOUS at 19:48

## 2021-10-11 RX ADMIN — FENTANYL CITRATE 50 MCG: 50 INJECTION, SOLUTION INTRAMUSCULAR; INTRAVENOUS at 19:13

## 2021-10-11 RX ADMIN — TRANEXAMIC ACID 1 G: 100 INJECTION, SOLUTION INTRAVENOUS at 15:42

## 2021-10-11 RX ADMIN — Medication 2 MILLI-UNITS/MIN: at 08:47

## 2021-10-11 RX ADMIN — FENTANYL CITRATE 25 MCG: 50 INJECTION INTRAMUSCULAR; INTRAVENOUS at 19:44

## 2021-10-11 RX ADMIN — ROCURONIUM BROMIDE 40 MG: 50 INJECTION, SOLUTION INTRAVENOUS at 18:36

## 2021-10-11 RX ADMIN — SODIUM CHLORIDE 250 ML: 9 INJECTION, SOLUTION INTRAVENOUS at 15:40

## 2021-10-11 RX ADMIN — LIDOCAINE HYDROCHLORIDE 60 MG: 20 INJECTION, SOLUTION INFILTRATION; PERINEURAL at 18:36

## 2021-10-11 RX ADMIN — SUGAMMADEX 200 MG: 100 INJECTION, SOLUTION INTRAVENOUS at 19:25

## 2021-10-11 RX ADMIN — FENTANYL CITRATE 100 MCG: 50 INJECTION INTRAMUSCULAR; INTRAVENOUS at 15:35

## 2021-10-11 RX ADMIN — LIDOCAINE HYDROCHLORIDE 20 ML: 10 INJECTION, SOLUTION EPIDURAL; INFILTRATION; INTRACAUDAL; PERINEURAL at 16:19

## 2021-10-11 RX ADMIN — IBUPROFEN 600 MG: 600 TABLET, FILM COATED ORAL at 20:46

## 2021-10-11 RX ADMIN — DOCUSATE SODIUM 100 MG: 100 CAPSULE, LIQUID FILLED ORAL at 22:06

## 2021-10-11 RX ADMIN — Medication 340 ML/HR: at 16:16

## 2021-10-11 RX ADMIN — SODIUM CHLORIDE, POTASSIUM CHLORIDE, SODIUM LACTATE AND CALCIUM CHLORIDE: 600; 310; 30; 20 INJECTION, SOLUTION INTRAVENOUS at 08:34

## 2021-10-11 RX ADMIN — SODIUM CHLORIDE, POTASSIUM CHLORIDE, SODIUM LACTATE AND CALCIUM CHLORIDE: 600; 310; 30; 20 INJECTION, SOLUTION INTRAVENOUS at 10:25

## 2021-10-11 RX ADMIN — SODIUM CHLORIDE, POTASSIUM CHLORIDE, SODIUM LACTATE AND CALCIUM CHLORIDE: 600; 310; 30; 20 INJECTION, SOLUTION INTRAVENOUS at 19:01

## 2021-10-11 RX ADMIN — SODIUM CHLORIDE, POTASSIUM CHLORIDE, SODIUM LACTATE AND CALCIUM CHLORIDE: 600; 310; 30; 20 INJECTION, SOLUTION INTRAVENOUS at 14:51

## 2021-10-11 RX ADMIN — FENTANYL CITRATE 100 MCG: 50 INJECTION, SOLUTION INTRAMUSCULAR; INTRAVENOUS at 18:36

## 2021-10-11 RX ADMIN — FENTANYL CITRATE 25 MCG: 50 INJECTION INTRAMUSCULAR; INTRAVENOUS at 19:53

## 2021-10-11 RX ADMIN — MIDAZOLAM HYDROCHLORIDE 2 MG: 1 INJECTION, SOLUTION INTRAMUSCULAR; INTRAVENOUS at 18:28

## 2021-10-11 RX ADMIN — GLYCOPYRROLATE 0.2 MG: 0.2 INJECTION, SOLUTION INTRAMUSCULAR; INTRAVENOUS at 18:36

## 2021-10-11 RX ADMIN — PROPOFOL 160 MG: 10 INJECTION, EMULSION INTRAVENOUS at 18:36

## 2021-10-11 RX ADMIN — PHENYLEPHRINE HYDROCHLORIDE 100 MCG: 10 INJECTION INTRAVENOUS at 19:00

## 2021-10-11 ASSESSMENT — MIFFLIN-ST. JEOR: SCORE: 1598.12

## 2021-10-11 NOTE — PROGRESS NOTES
2Data: Patient admitted to room 06 at 0740. Patient is a . Prenatal record reviewed.   OB History    Para Term  AB Living   4 2 0 2 1 1   SAB TAB Ectopic Multiple Live Births   0 0 0 1 3      # Outcome Date GA Lbr Gagan/2nd Weight Sex Delivery Anes PTL Lv   4 Current            3A  19 21w1d   M    ND      Birth Comments: PPROM twins   3B  19    M    ND      Birth Comments: PPROM twins   2  11 31w0d   M Vag-Spont   EDEN      Name: MONA HOPKINS ( RERE)      Apgar1: 9  Apgar5: 9   1 AB            .  Medical History:   Past Medical History:   Diagnosis Date     Acanthosis nigricans      Accessory skin tags      Anemia      Asthma      Bacterial vaginosis 2019    3/21/19     Cervical insufficiency during pregnancy in second trimester, antepartum 2019     Chlamydia      Depression      Dichorionic diamniotic twin pregnancy in second trimester 2019     Diet controlled gestational diabetes mellitus (GDM) in second trimester 2019     Gestational diabetes      Gonococcal infection (acute) of lower genitourinary tract     Created by Conversion      Heartburn      Herpes simplex      Hirsutism      History of  delivery, currently pregnant in first trimester 2019     Low back pain      Menorrhagia      Morbid obesity (H)       labor 2011   .  Gestational age 37w2d. Vital signs per doc flowsheet. Fetal movement present. Patient reports Induction Of Labor (h/o PPROM twin delivery at 21w)   as reason for admission. Support persons Woodrow (FOB and Fiance) and Tonja (Sister) present.  Action: Verbal consent for EFM, external fetal monitors applied. Admission assessment completed. Patient and support persons educated on labor process. Patient instructed to report change in fetal movement, contractions, vaginal leaking of fluid or bleeding, abdominal pain, or any concerns related to the pregnancy to her nurse/physician. Patient  oriented to room, call light in reach.   Response: Dr. Humphries informed of patient arrival, EFM, and SVE. Plan per provider is pitocin induction. Patient verbalized understanding of education and verbalized agreement with plan. Patient coping with labor via non-medical techniques.  Would like to avoid an epidural if she is able.  Reviewed options for Nitrous oxide and IV fentanyl.

## 2021-10-11 NOTE — OP NOTE
Operative Note     Patient Name:  Russell Gomez  Medical Record Number:  [id number]  YOB: 1990  Date of Service: 10/11/2021    Primary Surgeon:  Lali Humphries MD     Assistants: Denisse    Pre-op Diagnosis:   Status post .  Desires permanent sterilization.  Post-op Diagnosis:   Same     Procedure:    Postpartum bilateral tubal ligation via bilateral salpingectomy technique    EBL:  5 mL    Specimens:  Portions of right and left fallopian tube    Complications / Findings:  None.  Normal Right and left tube and ovaries.     Anesthesia: General    31 year old  PPD#0 s/p  who desires permanent sterilization.  Federal tubal papers were signed on 21 greater than 30 days prior to procedure, reviewed and valid.  Risks and benefits of the procedure were discussed with the patient including risk of failure, increased risk of ectopic gestation if pregnancy occurs, damage to uterus or surrounding organs (bowel, bladder, blood vessels), bleeding and infection.  Alternate birth control methods were discussed and patient desired to proceed.    Procedure in Detail:  Patient was taken to the operating room where she received general anesthesia.  She was prepped and draped in the usual sterile fashion.  A time out was held.  5 cc of 0.25% marcaine was injected immediately inferior to the umbilicus.  A 4 cm transverse infraumbilical incision was made using the scalpel.  The incision was carried down sharply to the fascia, which was grasped and entered with Rodriguez scissors.  Peritoneum was identified and entered sharply.  The patient was planed to the left and the right fallopian tube was identified.  The right fallopian tube was brought to the level of the incision, grasped with a Tyron clamp near the cornua, and walked out to the level of the fimbriae with assistance of another [place].  The LigaSure was used to completely excise the right fallopian tube. Excellent hemostasis was noted. The  same procedure was carried out of the left. Excellent hemostasis again noted.    The peritoneum and fascia were then closed in a single layer using an 0 vicryl suture. The subcutaneous fat was irrigated with marcaine. The skin was closed in a subcuticular fashion using a 4-0 Vicryl.    The patient tolerated the procedure well.  Sponge, lap, and sharp counts were correct times two.  The patient was taken to the recovery room in stable condition.      Paulina Humphries MD

## 2021-10-11 NOTE — ANESTHESIA PROCEDURE NOTES
Airway       Patient location during procedure: OR       Procedure Start/Stop Times: 10/11/2021 6:40 PM  Staff -        Anesthesiologist:  Kathi Gregory MD       Resident/Fellow: Chet Foley       CRNA: Erlinda Shelton APRN CRNA       Performed By: SRNA  Consent for Airway        Urgency: elective  Indications and Patient Condition       Indications for airway management: renata-procedural       Induction type:intravenous       Mask difficulty assessment: 1 - vent by mask    Final Airway Details       Final airway type: endotracheal airway       Successful airway: ETT - single  Endotracheal Airway Details        ETT size (mm): 7.0       Cuffed: yes       Successful intubation technique: direct laryngoscopy       DL Blade Type: Mauricio 2       Grade View of Cords: 1       Adjucts: stylet and tooth guard       Position: Right       Measured from: lips       Secured at (cm): 22       Bite block used: None    Post intubation assessment        Placement verified by: capnometry, equal breath sounds and chest rise        Number of attempts at approach: 1       Secured with: silk tape       Ease of procedure: easy       Dentition: Intact and Unchanged

## 2021-10-11 NOTE — L&D DELIVERY NOTE
Delivery Summary    Russell Gomez MRN# [id number]   Age: 31 year old YOB: 1990     VAGINAL DELIVERY NOTE    PRE DELIVERY DIAGNOSIS  1) Intrauterine pregnancy at 37w2d   2) Prior stillbirth  3) History of recent gastric bypass  4) Anemia  5) History of  delivery  6) History of genital HSV  7) Chronic vomiting and pain throughout pregnancy      POST DELIVERY DIAGNOSIS  1) Intrauterine pregnancy at 37w2d   2) Delivery of a viable female - Nancy.  3) Apgars: 8 and 9  4) weight:3070 g    Delivery Method/Type:       PROVIDER:   Lali Humphries MD     ANESTHESIA: IV Fentanyl    COMPLICATIONS: None    DESCRIPTION OF PROCEDURE  Russell Gomez is a 31 year old  with prenatal care with myself who was admitted at 37w2d for elective induction for history of prior stillbirth. Patient had Pitocin, then AROM. She had intermittent variables which improved with amnioinfusion. Patient had a .  Delayed cord clamping performed.  No gross fetal defects were observed. Pitocin was administered. Placenta delivered intact with 3 vessel cord. The perineum was then evaluated and noted to have a periclitoral, 2nd degree and bilateral vaginal sulcal lacerations that was repaired in the usual manner with 3-0 Vicryl Rapide and 4-0 Vicryl for the periclitoral laceration. Delivery QBL (mL): 388     Lali Humphries MD                          Patricia, Female-Russell [[id number]]    Labor Event Times    Labor onset date: 10/11/21 Onset time:  9:40 AM   Dilation complete date: 10/11/21 Complete time:  4:04 PM   Start pushing date/time: 10/11/2021 1606      Labor Events     labor?: No   steroids: None  Labor Type: Induction/Cervical ripening  Predominate monitoring during 1st stage: continuous electronic fetal monitoring     Antibiotics received during labor?: No     Rupture date/time: 10/11/21 1232   Rupture type: Artificial Rupture of Membranes  Fluid color: Clear  Fluid odor:  Normal     Induction: Oxytocin  Induction date/time: 10/11/21 0847   Cervical ripening date/time:     Indications for induction: Elective     Augmentation: AROM  Indications for augmentation: Ineffective Contraction Pattern  1:1 continuous labor support provided by?: RN       Delivery/Placenta Date and Time    Delivery Date: 10/11/21 Delivery Time:  4:13 PM   Placenta Date/Time: 10/11/2021  4:17 PM  Oxytocin given at the time of delivery: after delivery of baby  Delivering clinician: Lali Humphries MD   Other personnel present at delivery:  Provider Role   Lolita Olivier, RN Delivery Nurse   Olya Lake RN Registered Nurse   Harvinder August RN Registered Nurse         Vaginal Counts     Initial count performed by 2 team members:  Two Team Members   Amna Humphries       [place] Suture Needles Sponges (RETIRED) Instruments   Initial counts 0 0 10    Added to count 1 4 5    Relief counts       Final counts             Placed during labor Accounted for at the end of labor   FSE Yes Yes   IUPC Yes Yes   Cervadil                       Apgars    Living status: Living   1 Minute 5 Minute 10 Minute 15 Minute 20 Minute   Skin color: 0  1       Heart rate: 2  2       Reflex irritability: 2  2       Muscle tone: 2  2       Respiratory effort: 2  2       Total: 8  9       Apgars assigned by: HARVINDRE AUGUST RN     Cord    Vessels: 3 Vessels    Cord Complications: Nuchal   Nuchal Intervention: delivered through         Nuchal cord description: loose nuchal cord         Cord Blood Disposition: Lab    Gases Sent?: No    Delayed cord clamping?: Yes    Cord Clamping Delay (seconds): 31-60 seconds    Stem cell collection?: No       [place] Resuscitation    Methods: None  [place] Care at Delivery: Called by Dr. Humphries to attend delivery secondary to nonreassuring FHT and and recent fentanyl dose. Infant delivered at 1613 and cried vigorously. Infant then taken to warmer where she was dried and stimulated.  "No further resuscitation was required. Infant [facility] be [facility] to [facility] for routine cares.    SHANNON New 10/11/2021 4:28 PM       [place] Measurements    Weight: 6 lb 12.3 oz Length: 1' 6\"   Head circumference: 13 cm       Skin to Skin and Feeding Plan    Skin to skin initiation date/time: 1/10/1841    Skin to skin with: Mother  Skin to skin end date/time:        Labor Events and Shoulder Dystocia    Fetal Tracing Prior to Delivery: Category 2  Shoulder dystocia present?: Neg     Delivery (Maternal) (Provider to Complete) ([id number])    Episiotomy: None  Perineal lacerations: 2nd     Repaired?: Yes   Sulcus laceration: bilateral Repaired?: Yes   Vaginal laceration?: Yes Repaired?: Yes   Repair suture: 3-0 Rapide, 4-0 Vicryl  Number of repair packets: 10  Genital tract inspection done: Pos     Blood Loss  Mother: Patricia, May N #[id number]   Start of Mother's Information    Delivery Blood Loss  10/11/21 0940 - 10/11/21 1706    Delivery QBL (mL) Hospital Encounter 388 mL    Total  388 mL         End of Mother's Information  Mother: Patricia, May N #[id number]          Delivery - Provider to Complete ([id number])    Delivering clinician: Lali Humphries MD  Attempted Delivery Types (Choose all that apply): Spontaneous Vaginal Delivery  Delivery Type (Choose the 1 that will go to the Birth History): Vaginal, Spontaneous                   Other personnel:  Provider Role   Lolita Olivier, RN Delivery Nurse   Olya Lake RN Registered Nurse   Roopa August RN Registered Nurse                Placenta    Date/Time: 10/11/2021  4:17 PM  Removal: Spontaneous  Disposition: Hospital disposal           Anesthesia    Method: Local, INTRAVENOUS                 Presentation and Position    Presentation: Vertex    Position: Left Occiput Anterior                 Lali Humphries MD  "

## 2021-10-11 NOTE — PROGRESS NOTES
"Labor progress note    S: Patient breathing through contractions    O  Vitals: /74   Temp 97.8  F (36.6  C) (Oral)   Resp 20   Ht 1.626 m (5' 4\")   Wt 89.8 kg (198 lb)   LMP 2021   SpO2 96%   BMI 33.99 kg/m      SVE: 3/70/-2 AROM to clear fluid    FHT: 140 baseline, moderate variability, + accelerations, variable decelerations  [place]: Contractions every 3 minutes    A/P:  Russell Gomez is a 31 year old  at 37w2d here for IOL for prior stillbirth  -AROM now  -Pitocin augmentation    Paulina Humphries MD     "

## 2021-10-11 NOTE — PROGRESS NOTES
Late entry:  Dr. Humphries to bedside around 1230 for AROM. SVE 3/70/-2/post.  After AROM, recurrent variable decels lasting 30-60 sec down to a sunitha as low as the 60s noted with contractions.  Patient repositioned side-to-side without change in variables.  Dr. Humphries back to bedside at 1250 and consented patient for internal monitors.  SVE 5-6/70/-2 at this time.  Patient to hands-and-knees, pitocin turned off at 1306.  Repositioned to left side.  IV bolus started.  Orders received for amnioinfusion.  Amnio bolus started at 1335.  Dr. Humphries back to bedside to address patient's questions and concerns.  Recurrent variables resolved and IV pit restarted at 2 nikolas-units/min at 1344 per Dr. Humphries  Telephone update given to Dr. Humphries at 1400, reviewed EFM, no variable decels with the last few contractions, plan to hold on the continuous infusion for amnioinfusion at this time.

## 2021-10-11 NOTE — H&P
[facility]    History and Physical  Obstetrics and Gynecology     Date of Admission:  10/11/2021    Assessment & Plan   Russell Gomez is a 31 year old  at 37w2d who presents for induction due to anxiety surrounding prior stillbirth.    PLAN:   Admit - see IP orders  Labor induction with Pitocin   AROM when able    Paulina Humphries MD, FACOG, [facility]  10/11/2021 9:23 AM         History of Present Illness   Russell Gomez is a 31 year old female  at 37w2d by 8 week US is [facility] for induction of labor for prior stillbirth. Patient has had a complex pregnancy with significant vomiting throughout, likely related to her recent gastric bypass and has been followed by bariatrics and nutrition. She has been on Elisabet throughout her pregnancy.     PRENATAL COURSE  Prenatal course was complicated by:  Plans PPTL - FTP signed 21  History of gestational diabetes, not able to tolerate GCT with bypass. Glucoses have been normal.  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   Past Medical History:   Diagnosis Date     Acanthosis nigricans      Accessory skin tags      Anemia      Asthma      Bacterial vaginosis 2019    3/21/19     Cervical insufficiency during pregnancy in second trimester, antepartum 2019     Chlamydia      Depression      Dichorionic diamniotic twin pregnancy in second trimester 2019     Diet controlled gestational diabetes mellitus (GDM) in second trimester 2019     Gestational diabetes      Gonococcal infection (acute) of lower genitourinary tract     Created by Conversion      Heartburn      Herpes simplex      Hirsutism      History of   delivery, currently pregnant in first trimester 2019     Low back pain      Menorrhagia      Morbid obesity (H)       labor 2011       Past Surgical History   I have reviewed this patient's surgical history and updated it with pertinent information if needed.  Past Surgical History:   Procedure Laterality Date     WA LAP, BREONNA RESTRICT PROC, LONGITUDINAL GASTRECTOMY N/A 10/27/2020    Procedure: GASTRECTOMY, SLEEVE, LAPAROSCOPIC;  Surgeon: Homar Richey MD;  Location: [facility];  Service: General     TONSILLECTOMY         Prior to Admission Medications   Prior to Admission Medications   Prescriptions Last Dose Informant Patient Reported? Taking?   Prenatal MV & Min w/FA-DHA (PRENATAL ADULT GUMMY/DHA/FA PO)   Yes No   Vitamin D, Cholecalciferol, 25 MCG (1000 UT) TABS   Yes Yes   calcium carbonate (OS-BRYANNA) 500 MG tablet   Yes Yes   Sig: Take 1 tablet by mouth 2 times daily   cyanocobalamin (VITAMIN B-12) 1000 MCG SUBL sublingual tablet   Yes Yes   Sig: Place 1,000 mcg under the tongue daily      Facility-Administered Medications: None     Allergies   Allergies   Allergen Reactions     Pcn [Penicillins] Swelling       Social History   Has recently become engaged to FOB.    Family History   I have reviewed this patient's family history and updated it with pertinent information if needed.   Family History   Problem Relation Age of Onset     Hypertension Mother      Asthma Mother      Obesity Sister      Diabetes Paternal Aunt      Obesity Paternal Aunt      Diabetes Paternal Grandfather      Obesity Maternal Aunt      Arthritis Maternal Grandmother      Cerebrovascular Disease Paternal Grandmother      Arthritis Paternal Grandmother      Hypertension Son        Immunization History   TDAP given this pregnancy.  Influenza vaccine not given this pregnancy.    Physical Exam   Temp: 98.4  F (36.9  C) Temp src: Oral BP: 121/75     Resp: 20 SpO2: 96 %      Vital Signs with Ranges  Temp:   [98.4  F (36.9  C)] 98.4  F (36.9  C)  Resp:  [20] 20  BP: (121)/(75) 121/75  SpO2:  [96 %] 96 %    Abdomen: gravid, single vertex fetus, non-tender, EFW 6 lbs 0  Cervical Exam: 2.5/70/-2    Fetal Heart Tones: Category 1    Constitutional: healthy, alert  Respiratory: No increased work of breathing, good air exchange.  Cardiovascular: RRR

## 2021-10-11 NOTE — ANESTHESIA PREPROCEDURE EVALUATION
Anesthesia Pre-Procedure Evaluation    Patient: Russell Gomez   MRN: [id number] : 1990        Preoperative Diagnosis: Admission for sterilization [Z30.2]    Procedure : Procedure(s):  LIGATION, FALLOPIAN TUBE, POSTPARTUM          Past Medical History:   Diagnosis Date     Acanthosis nigricans      Accessory skin tags      Anemia      Asthma      Bacterial vaginosis 2019    3/21/19     Cervical insufficiency during pregnancy in second trimester, antepartum 2019     Chlamydia      Depression      Dichorionic diamniotic twin pregnancy in second trimester 2019     Diet controlled gestational diabetes mellitus (GDM) in second trimester 2019     Gestational diabetes      Gonococcal infection (acute) of lower genitourinary tract     Created by Conversion      Heartburn      Herpes simplex      Hirsutism      History of  delivery, currently pregnant in first trimester 2019     Low back pain      Menorrhagia      Morbid obesity (H)       labor 2011      Past Surgical History:   Procedure Laterality Date     [place] LAP, BREONNA RESTRICT PROC, LONGITUDINAL GASTRECTOMY N/A 10/27/2020    Procedure: GASTRECTOMY, SLEEVE, LAPAROSCOPIC;  Surgeon: Homar Richey MD;  Location: [facility];  Service: General     TONSILLECTOMY        Allergies   Allergen Reactions     Pcn [Penicillins] Swelling      Social History     Tobacco Use     Smoking status: Never Smoker     Smokeless tobacco: Never Used   Substance Use Topics     Alcohol use: No      Wt Readings from Last 1 Encounters:   10/11/21 89.8 kg (198 lb)        Anesthesia Evaluation            ROS/MED HX  ENT/Pulmonary:     (+) asthma     Neurologic:  - neg neurologic ROS     Cardiovascular:  - neg cardiovascular ROS     METS/Exercise Tolerance:     Hematologic:  - neg hematologic  ROS     Musculoskeletal:  - neg musculoskeletal ROS     GI/Hepatic:     (+) GERD, Asymptomatic on medication,     Renal/Genitourinary:  - neg  Renal ROS     Endo:     (+) Obesity,     Psychiatric/Substance Use:       Infectious Disease:       Malignancy:       Other:            Physical Exam    Airway        Mallampati: II   TM distance: > 3 FB   Neck ROM: full     Respiratory Devices and Support         Dental  no notable dental history         Cardiovascular          Rhythm and rate: regular     Pulmonary           breath sounds clear to auscultation           OUTSIDE LABS:  CBC:   Lab Results   Component Value Date    WBC 8.0 10/08/2021    WBC 7.9 10/15/2020    HGB 10.1 (L) 10/11/2021    HGB 8.9 (L) 10/08/2021    HCT 30.3 (L) 10/08/2021    HCT 39.6 10/15/2020     10/08/2021     10/15/2020     BMP:   Lab Results   Component Value Date     10/08/2021     10/15/2020    POTASSIUM 4.2 10/08/2021    POTASSIUM 4.4 10/15/2020    CHLORIDE 109 (H) 10/08/2021    CHLORIDE 105 10/15/2020    CO2 20 (L) 10/08/2021    CO2 25 10/15/2020    BUN 7 (L) 10/08/2021    BUN 12 10/15/2020    CR 0.70 10/08/2021    CR 0.73 10/15/2020    GLC 71 10/08/2021    GLC 78 10/15/2020     COAGS:   Lab Results   Component Value Date    PTT 28 10/15/2020    INR 0.98 10/15/2020     POC:   Lab Results   Component Value Date    HCG Positive (A) 03/05/2021     HEPATIC:   Lab Results   Component Value Date    ALBUMIN 2.5 (L) 10/08/2021    PROTTOTAL 6.5 10/08/2021    ALT <9 10/08/2021    AST 27 10/08/2021    ALKPHOS 120 10/08/2021    BILITOTAL 0.4 10/08/2021     OTHER:   Lab Results   Component Value Date    A1C 5.1 04/30/2020    BRYANNA 9.4 10/08/2021    MAG 1.7 (L) 10/08/2021    TSH 0.91 04/30/2020       Anesthesia Plan    ASA Status:  2      Anesthesia Type: General.     - Airway: ETT   Induction: Intravenous.   Maintenance: Inhalation.        Consents    Anesthesia Plan(s) and associated risks, benefits, and realistic alternatives discussed. Questions answered and patient/representative(s) expressed understanding.     - Discussed with:  Patient         Postoperative  Care            Comments:                Kathi Gregory MD

## 2021-10-11 NOTE — PROGRESS NOTES
"Labor progress note    S: Breathing through contractions    O  Vitals: /74   Temp 97.8  F (36.6  C) (Oral)   Resp 20   Ht 1.626 m (5' 4\")   Wt 89.8 kg (198 lb)   LMP 2021   SpO2 96%   BMI 33.99 kg/m      SVE: /-2    FHT: 140 baseline, moderate variability, + accelerations, repetitive variable decelerations  [place]: Every 4 min    A/P:  Russell Gomez is a 31 year old  at 37w2d here for IOL for prior stillbirth  -IUPC and FSE placed for Category 2 FHT  -May amnioinfuse if not improving with position changes  -Anticipate     Paulina Humphries MD     "

## 2021-10-11 NOTE — INTERVAL H&P NOTE
I have reviewed the surgical (or preoperative) H&P that is linked to this encounter, and examined the patient. There are no significant changes. Patient had an . Hgb 10.1 after delivery with  mL before hgb drawn. Informed consent signed.   Paulina Humphries MD, FACOG, [facility]  10/11/2021 6:02 PM

## 2021-10-11 NOTE — PROGRESS NOTES
Telephone update given to Dr. Humphries:  May presented to [facility] for scheduled induction.  SVE 2-3/70/-2/soft/mid.  EFM on for 15 minute so far and category 2, minimal variability and a 20x20 variable with cervical exam.  May appears very uncomfortable and it is difficult for her to move, which Dr. Humphries reports is not new.  Plan to start IV and orders placed for pitocin augmentation.

## 2021-10-11 NOTE — PLAN OF CARE
Problem: Delayed Labor Progression (Labor)  Goal: Effective Progression to Delivery  Outcome: Improving    Scheduled induction for h/o fetal demise at 21 weeks.  IV oxytocin started at 0847.  Continue titrating oxytocin to achieve contractions that are 2-3 min apart, lasting 60-90 seconds.

## 2021-10-11 NOTE — PROGRESS NOTES
Dr. Humphries updated: May is lisset every 3 minutes, palpating moderate, uncomfortable with contractions but coping well. Changing positions frequently, currently on the ball.  No new orders at this time.

## 2021-10-12 LAB — HGB BLD-MCNC: 8.8 G/DL (ref 11.7–15.7)

## 2021-10-12 PROCEDURE — 120N000001 HC R&B MED SURG/OB

## 2021-10-12 PROCEDURE — 85018 HEMOGLOBIN: CPT | Performed by: OBSTETRICS & GYNECOLOGY

## 2021-10-12 PROCEDURE — G0008 ADMIN INFLUENZA VIRUS VAC: HCPCS | Performed by: OBSTETRICS & GYNECOLOGY

## 2021-10-12 PROCEDURE — 250N000013 HC RX MED GY IP 250 OP 250 PS 637: Performed by: PHYSICIAN ASSISTANT

## 2021-10-12 PROCEDURE — 250N000011 HC RX IP 250 OP 636: Performed by: OBSTETRICS & GYNECOLOGY

## 2021-10-12 PROCEDURE — 250N000013 HC RX MED GY IP 250 OP 250 PS 637: Performed by: OBSTETRICS & GYNECOLOGY

## 2021-10-12 PROCEDURE — 90686 IIV4 VACC NO PRSV 0.5 ML IM: CPT | Performed by: OBSTETRICS & GYNECOLOGY

## 2021-10-12 PROCEDURE — 36415 COLL VENOUS BLD VENIPUNCTURE: CPT | Performed by: OBSTETRICS & GYNECOLOGY

## 2021-10-12 RX ORDER — NALOXONE HYDROCHLORIDE 0.4 MG/ML
0.4 INJECTION, SOLUTION INTRAMUSCULAR; INTRAVENOUS; SUBCUTANEOUS
Status: DISCONTINUED | OUTPATIENT
Start: 2021-10-12 | End: 2021-10-13 | Stop reason: HOSPADM

## 2021-10-12 RX ORDER — HYDROXYZINE HYDROCHLORIDE 25 MG/1
25 TABLET, FILM COATED ORAL EVERY 6 HOURS PRN
Status: DISCONTINUED | OUTPATIENT
Start: 2021-10-12 | End: 2021-10-13 | Stop reason: HOSPADM

## 2021-10-12 RX ORDER — NALOXONE HYDROCHLORIDE 0.4 MG/ML
0.2 INJECTION, SOLUTION INTRAMUSCULAR; INTRAVENOUS; SUBCUTANEOUS
Status: DISCONTINUED | OUTPATIENT
Start: 2021-10-12 | End: 2021-10-13 | Stop reason: HOSPADM

## 2021-10-12 RX ORDER — HYDROXYZINE HYDROCHLORIDE 50 MG/1
50 TABLET, FILM COATED ORAL EVERY 6 HOURS PRN
Status: DISCONTINUED | OUTPATIENT
Start: 2021-10-12 | End: 2021-10-13 | Stop reason: HOSPADM

## 2021-10-12 RX ORDER — OXYCODONE HYDROCHLORIDE 5 MG/1
5 TABLET ORAL
Status: DISCONTINUED | OUTPATIENT
Start: 2021-10-12 | End: 2021-10-13 | Stop reason: HOSPADM

## 2021-10-12 RX ADMIN — ACETAMINOPHEN 650 MG: 325 TABLET ORAL at 09:23

## 2021-10-12 RX ADMIN — OXYCODONE 5 MG: 5 TABLET ORAL at 05:10

## 2021-10-12 RX ADMIN — OXYCODONE 5 MG: 5 TABLET ORAL at 09:23

## 2021-10-12 RX ADMIN — INFLUENZA A VIRUS A/VICTORIA/2570/2019 IVR-215 (H1N1) ANTIGEN (FORMALDEHYDE INACTIVATED), INFLUENZA A VIRUS A/TASMANIA/503/2020 IVR-221 (H3N2) ANTIGEN (FORMALDEHYDE INACTIVATED), INFLUENZA B VIRUS B/PHUKET/3073/2013 ANTIGEN (FORMALDEHYDE INACTIVATED), AND INFLUENZA B VIRUS B/WASHINGTON/02/2019 ANTIGEN (FORMALDEHYDE INACTIVATED) 0.5 ML: 15; 15; 15; 15 INJECTION, SUSPENSION INTRAMUSCULAR at 13:23

## 2021-10-12 RX ADMIN — ACETAMINOPHEN 650 MG: 325 TABLET ORAL at 18:30

## 2021-10-12 RX ADMIN — OXYCODONE 5 MG: 5 TABLET ORAL at 13:24

## 2021-10-12 RX ADMIN — ACETAMINOPHEN 650 MG: 325 TABLET ORAL at 13:24

## 2021-10-12 RX ADMIN — DOCUSATE SODIUM 100 MG: 100 CAPSULE, LIQUID FILLED ORAL at 09:22

## 2021-10-12 RX ADMIN — ACETAMINOPHEN 650 MG: 325 TABLET ORAL at 05:10

## 2021-10-12 RX ADMIN — HYDROXYZINE HYDROCHLORIDE 50 MG: 25 TABLET, FILM COATED ORAL at 09:38

## 2021-10-12 RX ADMIN — OXYCODONE 5 MG: 5 TABLET ORAL at 18:30

## 2021-10-12 NOTE — OP NOTE
Assisted Dr Humphries with postpartum bilateral salpingectomy.  Assisted with skin incision, fascial incision and provided retraction/visualization during bilateral salpingectomy.  Assisted through closure of fascia.  See operative note by Dr Humphries for full details.  Total time 20 minutes.    MD David

## 2021-10-12 NOTE — PROGRESS NOTES
Wolf COLLADO called regarding pt continued pain , pt is due now for tylenol and oxycodone will give this, however, pt reported this combination alone did not help with pain.  Orders received for vistaril first then may try a x1 dose of oxycodone if needed.  Will continue to monitor pain  And Wolf to round later.

## 2021-10-12 NOTE — ANESTHESIA POSTPROCEDURE EVALUATION
Patient: Russell HERNANDEZ [place]    Procedure: Procedure(s):  LIGATION, FALLOPIAN TUBE, POSTPARTUM       Diagnosis:Admission for sterilization [Z30.2]  Diagnosis Additional Information: No value filed.    Anesthesia Type:  General    Note:  Disposition: Inpatient   Postop Pain Control: Uneventful            Sign Out: Well controlled pain   PONV: No   Neuro/Psych: Uneventful            Sign Out: Acceptable/Baseline neuro status   Airway/Respiratory: Uneventful            Sign Out: Acceptable/Baseline resp. status   CV/Hemodynamics: Uneventful            Sign Out: Acceptable CV status; No obvious hypovolemia; No obvious fluid overload   Other NRE:    DID A NON-ROUTINE EVENT OCCUR?            Last vitals:  Vitals Value Taken Time   /70 10/11/21 2000   Temp 36.3  C (97.4  F) 10/11/21 2000   Pulse 87 10/11/21 2000   Resp 30 10/11/21 2000   SpO2 98 % 10/11/21 2000       Electronically Signed By: Kathi Gregory MD  October 11, 2021  11:11 PM

## 2021-10-12 NOTE — ANESTHESIA CARE TRANSFER NOTE
Patient: Russell HERNANDEZ [place]    Procedure: Procedure(s):  LIGATION, FALLOPIAN TUBE, POSTPARTUM       Diagnosis: Admission for sterilization [Z30.2]  Diagnosis Additional Information: No value filed.    Anesthesia Type:   General     Note:    Oropharynx: oropharynx clear of all foreign objects and spontaneously breathing  Level of Consciousness: drowsy  Oxygen Supplementation: face mask  Level of Supplemental Oxygen (L/min / FiO2): 5  Independent Airway: airway patency satisfactory and stable  Dentition: dentition unchanged  Vital Signs Stable: post-procedure vital signs reviewed and stable  Report to RN Given: handoff report given  Patient transferred to: PACU    Handoff Report: Identifed the Patient, Identified the Reponsible Provider, Reviewed the pertinent medical history, Discussed the surgical course, Reviewed Intra-OP anesthesia mangement and issues during anesthesia, Set expectations for post-procedure period and Allowed opportunity for questions and acknowledgement of understanding      Vitals:  Vitals Value Taken Time   /68 10/11/21 1930   Temp 98.0 F 10/11/21  1930   Pulse 94 10/11/21 1932   Resp 30 10/11/21 1932   SpO2 100 % 10/11/21 1932   Vitals shown include unvalidated device data.    Electronically Signed By: MERY Young CRNA  October 11, 2021  7:33 PM

## 2021-10-12 NOTE — PROGRESS NOTES
"Vaginal Delivery Postpartum Day 1    Patient Name:  Russell Gomez  :      1990  MRN:      [id number]      Subjective:  The patient feels well but is sore from the tubal ligation yesterday. The patient has no emotional concerns. Her pain is controlled with current medications. The patient is ambulating well. She is voiding without difficulty and passing gas. The amount and color of the lochia is appropriate for the duration of recovery. The patient denies large clots. The baby is doing well.    Objective:  The patient has a blood pressure which is within the normal range. The uterine fundus is firm. Urinary output is adequate.     EXAM:  /64   Pulse 66   Temp 98  F (36.7  C) (Oral)   Resp 16   Ht 1.626 m (5' 4\")   Wt 89.8 kg (198 lb)   LMP 2021   SpO2 99%   Breastfeeding Unknown   BMI 33.99 kg/m      General: NAD  Abdomen: soft, NT  Fundus: firm, NT  Ext: no pain    Results for orders placed or performed during the hospital encounter of 10/11/21 (from the past 24 hour(s))   Hemoglobin   Result Value Ref Range    Hemoglobin 10.1 (L) 11.7 - 15.7 g/dL   Hemoglobin   Result Value Ref Range    Hemoglobin 8.8 (L) 11.7 - 15.7 g/dL         Assessment:    Normal postpartum course  Bilateral Tubal Ligation  Acute Blood Loss Anemia  Hx of Gastric Bypass  Hx of GDM  Hx of  Delivery - Elisabet weekly during this pregnancy  Anemia  Hypoplastic Nasal Bone, 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

## 2021-10-12 NOTE — PLAN OF CARE
Problem: Adjustment to Role Transition (Postpartum Vaginal Delivery)  Goal: Successful Maternal Role Transition  Outcome: Improving     Problem: Bleeding (Postpartum Vaginal Delivery)  Goal: Hemostasis  Outcome: Improving     Problem: Urinary Retention (Postpartum Vaginal Delivery)  Goal: Effective Urinary Elimination  Outcome: Improving     Back from tubal ligation at 2010, VSS, FFU/1 with scant to light lochia. Up to bathroom and voided 600mL. Ibuprofen given for pain rated 8/10 and improved to 5/10. [place] and attentive to baby. Supportive partner at bedside.

## 2021-10-12 NOTE — PLAN OF CARE
Problem: Pain (Postpartum Vaginal Delivery)  Goal: Acceptable Pain Control  Outcome: Improving    Pt reported pain/nausea this AM. Provider notified for additional pain medication, PRN Atarax ordered and administered with good relief. Supplied pt with abdominal binder and lavender/yazan oil offered for comfort. Pt reports improved pain control. Continuing to monitor.

## 2021-10-12 NOTE — PLAN OF CARE
Problem: Adult Inpatient Plan of Care  Goal: Optimal Comfort and Wellbeing  Outcome: Improving

## 2021-10-13 VITALS
TEMPERATURE: 98.2 F | OXYGEN SATURATION: 99 % | DIASTOLIC BLOOD PRESSURE: 66 MMHG | HEIGHT: 64 IN | RESPIRATION RATE: 18 BRPM | HEART RATE: 75 BPM | SYSTOLIC BLOOD PRESSURE: 113 MMHG | WEIGHT: 198 LBS | BODY MASS INDEX: 33.8 KG/M2

## 2021-10-13 PROBLEM — Z90.79 STATUS POST BILATERAL SALPINGECTOMY: Status: ACTIVE | Noted: 2021-10-13

## 2021-10-13 PROBLEM — F32.A DEPRESSIVE DISORDER: Status: RESOLVED | Noted: 2021-03-26 | Resolved: 2021-10-13

## 2021-10-13 PROBLEM — Z87.59 HISTORY OF STILLBIRTH: Status: RESOLVED | Noted: 2021-10-11 | Resolved: 2021-10-13

## 2021-10-13 PROBLEM — E66.01 MORBID (SEVERE) OBESITY DUE TO EXCESS CALORIES (H): Status: RESOLVED | Noted: 2020-10-27 | Resolved: 2021-10-13

## 2021-10-13 PROBLEM — A74.9 CHLAMYDIA INFECTION: Status: RESOLVED | Noted: 2020-07-17 | Resolved: 2021-10-13

## 2021-10-13 PROBLEM — R55 PRE-SYNCOPE: Status: RESOLVED | Noted: 2019-05-03 | Resolved: 2021-10-13

## 2021-10-13 PROCEDURE — 250N000013 HC RX MED GY IP 250 OP 250 PS 637: Performed by: OBSTETRICS & GYNECOLOGY

## 2021-10-13 RX ORDER — OXYCODONE HYDROCHLORIDE 5 MG/1
5 TABLET ORAL EVERY 4 HOURS PRN
Qty: 6 TABLET | Refills: 0 | Status: SHIPPED | OUTPATIENT
Start: 2021-10-13 | End: 2021-11-30

## 2021-10-13 RX ORDER — ACETAMINOPHEN 500 MG
500-1000 TABLET ORAL EVERY 6 HOURS PRN
Qty: 60 TABLET | Refills: 0 | Status: SHIPPED | OUTPATIENT
Start: 2021-10-13 | End: 2022-05-26

## 2021-10-13 RX ADMIN — DOCUSATE SODIUM 100 MG: 100 CAPSULE, LIQUID FILLED ORAL at 07:46

## 2021-10-13 RX ADMIN — ACETAMINOPHEN 650 MG: 325 TABLET ORAL at 13:34

## 2021-10-13 RX ADMIN — OXYCODONE 5 MG: 5 TABLET ORAL at 13:34

## 2021-10-13 NOTE — PLAN OF CARE
Problem: Pain (Postpartum Vaginal Delivery)  Goal: Acceptable Pain Control  Outcome: Completed   Education record completed with patient, reviewed discharge AVS and plan of care for follow-up with pt, pt verbalizes understanding and knows to call clinic for any concerns or needs.  Pt discharged home.

## 2021-10-13 NOTE — PROGRESS NOTES
"Outreach Note for DARIUS Gomez  [id number]  1990    Chart reviewed, discharge follow-up plan discussed with patient's bedside RN, needs assessed. Postpartum home care visit requested by patient, May; nurse visit planned for Friday, 10/15, HC Intake updated. Post-delivery follow-up appointment with  planned in 6 weeks at [facility]; patient will schedule as instructed.     May is reported to have support at home, has baby care essentials, and feels ready to discharge today with , \"Nancy Stephens\". Outreach nurse will continue to follow and assist with discharge planning as needed. No additional discharge needs reported at this time.                "

## 2021-10-13 NOTE — PLAN OF CARE
Problem: Pain (Postpartum Vaginal Delivery)  Goal: Acceptable Pain Control  Outcome: Improving     Vitals stable. Assessments within normal limits. Pt complained of pain in her legs. Provider notified and evaluated pt. Plan is to use SCD and also have pt walk frequently around the escobar and in the room. Tylenol and Ibuprofen given for pain management. Ambulates independently.

## 2021-10-13 NOTE — DISCHARGE INSTRUCTIONS
You have a Home Care nurse visit planned for Friday, 10/15/21. The nurse will contact you to confirm the appointment time. If you do not receive a call by Friday morning, please call Home Care at [phone]. Please do not schedule a clinic appointment on the same day as home nurse visit.        Postpartum Vaginal Delivery Instructions    Activity       Ask family and friends for help when you need it.    Do not place anything in your vagina for 6 weeks.    You are not restricted on other activities, but take it easy for a few weeks to allow your body to recover from delivery.  You are able to do any activities you feel up to that point.    No driving until you have stopped taking your pain medications (usually two weeks after delivery).     Call your health care provider if you have any of these symptoms:       Increased pain, swelling, redness, or fluid around your stiches from an episiotomy or perineal tear.    A fever above 100.4 F (38 C) with or without chills when placing a thermometer under your tongue.    You soak a sanitary pad with blood within 1 hour, or you see blood clots larger than a golf ball.    Bleeding that lasts more than 6 weeks.    Vaginal discharge that smells bad.    Severe pain, cramping or tenderness in your lower belly area.    A need to urinate more frequently (use the toilet more often), more urgently (use the toilet very quickly), or it burns when you urinate.    Nausea and vomiting.    Redness, swelling or pain around a vein in your leg.    Problems breastfeeding or a red or painful area on your breast.    Chest pain and cough or are gasping for air.    Problems coping with sadness, anxiety, or depression.  If you have any concerns about hurting yourself or the baby, call your provider immediately.     You have questions or concerns after you return home.     Keep your hands clean:  Always wash your hands before touching your perineal area and stitches.  This helps reduce your risk of  infection.  If your hands aren't dirty, you may use an alcohol hand-rub to clean your hands. Keep your nails clean and short.

## 2021-10-13 NOTE — DISCHARGE SUMMARY
[facility] Discharge Summary    Russell Gomez MRN# [id number]   Age: 31 year old YOB: 1990     Date of Admission:  10/11/2021  Date of Discharge::  10/13/2021  Admitting Physician:  Lali Humphries MD  Discharge Physician:  Faye Lawson MD     [facility]            Admission Diagnoses:   History of stillbirth [Z87.59]          Discharge Diagnosis:   Normal spontaneous vaginal delivery  Intrauterine pregnancy at 37 weeks gestation          Procedures:   Procedure(s): spontaneous vaginal delivery  bilateral tubal ligation (BTL)- laparoscopic   induction of labor                    Medications Prior to Admission:     Medications Prior to Admission   Medication Sig Dispense Refill Last Dose     calcium carbonate (OS-BRYANNA) 500 MG tablet Take 1 tablet by mouth 2 times daily        cyanocobalamin (VITAMIN B-12) 1000 MCG SUBL sublingual tablet Place 1,000 mcg under the tongue daily        Vitamin D, Cholecalciferol, 25 MCG (1000 UT) TABS         Prenatal MV & Min w/FA-DHA (PRENATAL ADULT GUMMY/DHA/FA PO)                 Discharge Medications:     Current Discharge Medication List      START taking these medications    Details   acetaminophen (TYLENOL) 500 MG tablet Take 1-2 tablets (500-1,000 mg) by mouth every 6 hours as needed for mild pain  Qty: 60 tablet, Refills: 0    Associated Diagnoses: Status post bilateral salpingectomy      oxyCODONE (ROXICODONE) 5 MG tablet Take 1 tablet (5 mg) by mouth every 4 hours as needed  Qty: 6 tablet, Refills: 0    Associated Diagnoses: Status post bilateral salpingectomy         CONTINUE these medications which have NOT CHANGED    Details   calcium carbonate (OS-BRYANNA) 500 MG tablet Take 1 tablet by mouth 2 times daily      cyanocobalamin (VITAMIN B-12) 1000 MCG SUBL sublingual tablet Place 1,000 mcg under the tongue daily      Vitamin D, Cholecalciferol, 25 MCG (1000 UT) TABS       Prenatal MV & Min w/FA-DHA (PRENATAL ADULT  GUMMY/DHA/FA PO)                    Consultations:   No consultations were requested during this admission          Brief History of Labor:   admitted for  induction of labor due to history of stillbirth.  spontaneous vaginal delivery without complications.  bilateral tubal ligation (BTL) surgery without complications.             Hospital Course:   The patient's hospital course was unremarkable.  On discharge, her pain was well controlled. Vaginal bleeding is similar to peak menstrual flow.  Voiding without difficulty.  Ambulating well and tolerating a normal diet.  No fever.  Breastfeeding well.  Infant is stable.  No bowel movement yet.*  She was discharged on post-partum day #2.    Post-partum hemoglobin:   Hemoglobin   Date Value Ref Range Status   10/12/2021 8.8 (L) 11.7 - 15.7 g/dL Final             Discharge Instructions and Follow-Up:   Discharge diet: Regular   Discharge activity: Lifting restricted to 15 pounds   Discharge follow-up: Follow up with Dr. rice  in 6 weeks   Wound care: Steri-strips off  In  Days if still present            Discharge Disposition:   Discharged to home      Attestation:  I have reviewed today's vital signs, notes, medications, labs and imaging.    Faye Lawson MD

## 2021-10-14 LAB
PATH REPORT.COMMENTS IMP SPEC: NORMAL
PATH REPORT.COMMENTS IMP SPEC: NORMAL
PATH REPORT.FINAL DX SPEC: NORMAL
PATH REPORT.GROSS SPEC: NORMAL
PATH REPORT.MICROSCOPIC SPEC OTHER STN: NORMAL
PATH REPORT.RELEVANT HX SPEC: NORMAL
PHOTO IMAGE: NORMAL

## 2021-10-14 PROCEDURE — 88302 TISSUE EXAM BY PATHOLOGIST: CPT | Mod: 26 | Performed by: PATHOLOGY

## 2021-11-01 ENCOUNTER — MYC MEDICAL ADVICE (OUTPATIENT)
Dept: SURGERY | Facility: CLINIC | Age: 31
End: 2021-11-01

## 2021-11-01 DIAGNOSIS — F50.89 PICA: ICD-10-CM

## 2021-11-01 DIAGNOSIS — K91.2 POSTGASTRECTOMY MALABSORPTION: ICD-10-CM

## 2021-11-01 DIAGNOSIS — E55.9 VITAMIN D DEFICIENCY: ICD-10-CM

## 2021-11-01 DIAGNOSIS — Z98.84 S/P BARIATRIC SURGERY: Primary | ICD-10-CM

## 2021-11-01 DIAGNOSIS — Z90.3 POSTGASTRECTOMY MALABSORPTION: ICD-10-CM

## 2021-11-01 NOTE — TELEPHONE ENCOUNTER
May has her 1 year post op scheduled with Dr. Pierre in person on Nov 30. She will do labs in house. Her baby was born 10/30 so her 1 year post op was pushed back to November 30th.  Kathi Ramos RN

## 2021-11-16 ENCOUNTER — LAB (OUTPATIENT)
Dept: LAB | Facility: CLINIC | Age: 31
End: 2021-11-16
Payer: COMMERCIAL

## 2021-11-16 DIAGNOSIS — Z90.3 POSTGASTRECTOMY MALABSORPTION: ICD-10-CM

## 2021-11-16 DIAGNOSIS — D50.9 IRON DEFICIENCY ANEMIA, UNSPECIFIED IRON DEFICIENCY ANEMIA TYPE: ICD-10-CM

## 2021-11-16 DIAGNOSIS — K91.2 POSTGASTRECTOMY MALABSORPTION: ICD-10-CM

## 2021-11-16 DIAGNOSIS — Z98.84 S/P BARIATRIC SURGERY: ICD-10-CM

## 2021-11-16 DIAGNOSIS — E55.9 VITAMIN D DEFICIENCY: ICD-10-CM

## 2021-11-16 LAB
ALBUMIN SERPL-MCNC: 3.5 G/DL (ref 3.5–5)
ALP SERPL-CCNC: 81 U/L (ref 45–120)
ALT SERPL W P-5'-P-CCNC: 13 U/L (ref 0–45)
ANION GAP SERPL CALCULATED.3IONS-SCNC: 10 MMOL/L (ref 5–18)
AST SERPL W P-5'-P-CCNC: 16 U/L (ref 0–40)
BILIRUB SERPL-MCNC: 0.4 MG/DL (ref 0–1)
BUN SERPL-MCNC: 11 MG/DL (ref 8–22)
CALCIUM SERPL-MCNC: 9.5 MG/DL (ref 8.5–10.5)
CHLORIDE BLD-SCNC: 108 MMOL/L (ref 98–107)
CHOLEST SERPL-MCNC: 173 MG/DL
CO2 SERPL-SCNC: 23 MMOL/L (ref 22–31)
CREAT SERPL-MCNC: 0.82 MG/DL (ref 0.6–1.1)
ERYTHROCYTE [DISTWIDTH] IN BLOOD BY AUTOMATED COUNT: 17.4 % (ref 10–15)
FASTING STATUS PATIENT QL REPORTED: YES
FERRITIN SERPL-MCNC: 15 NG/ML (ref 10–130)
FOLATE SERPL-MCNC: 17.2 NG/ML
GFR SERPL CREATININE-BSD FRML MDRD: >90 ML/MIN/1.73M2
GLUCOSE BLD-MCNC: 74 MG/DL (ref 70–125)
HBA1C MFR BLD: 4.8 % (ref 0–5.6)
HCT VFR BLD AUTO: 36.2 % (ref 35–47)
HDLC SERPL-MCNC: 70 MG/DL
HGB BLD-MCNC: 11.1 G/DL (ref 11.7–15.7)
LDLC SERPL CALC-MCNC: 89 MG/DL
LDLC SERPL CALC-MCNC: 91 MG/DL
MCH RBC QN AUTO: 22.5 PG (ref 26.5–33)
MCHC RBC AUTO-ENTMCNC: 30.7 G/DL (ref 31.5–36.5)
MCV RBC AUTO: 73 FL (ref 78–100)
PLATELET # BLD AUTO: 216 10E3/UL (ref 150–450)
POTASSIUM BLD-SCNC: 4.1 MMOL/L (ref 3.5–5)
PROT SERPL-MCNC: 6.7 G/DL (ref 6–8)
PTH-INTACT SERPL-MCNC: 52 PG/ML (ref 10–86)
PTH-INTACT SERPL-MCNC: 53 PG/ML (ref 10–86)
RBC # BLD AUTO: 4.94 10E6/UL (ref 3.8–5.2)
SODIUM SERPL-SCNC: 141 MMOL/L (ref 136–145)
TRIGL SERPL-MCNC: 62 MG/DL
VIT B12 SERPL-MCNC: 1091 PG/ML (ref 213–816)
WBC # BLD AUTO: 5.5 10E3/UL (ref 4–11)

## 2021-11-16 PROCEDURE — 85027 COMPLETE CBC AUTOMATED: CPT

## 2021-11-16 PROCEDURE — 84590 ASSAY OF VITAMIN A: CPT | Mod: 90

## 2021-11-16 PROCEDURE — 80053 COMPREHEN METABOLIC PANEL: CPT

## 2021-11-16 PROCEDURE — 36415 COLL VENOUS BLD VENIPUNCTURE: CPT

## 2021-11-16 PROCEDURE — 80061 LIPID PANEL: CPT

## 2021-11-16 PROCEDURE — 83721 ASSAY OF BLOOD LIPOPROTEIN: CPT | Mod: 59

## 2021-11-16 PROCEDURE — 84425 ASSAY OF VITAMIN B-1: CPT | Mod: 90

## 2021-11-16 PROCEDURE — 82306 VITAMIN D 25 HYDROXY: CPT

## 2021-11-16 PROCEDURE — 83036 HEMOGLOBIN GLYCOSYLATED A1C: CPT

## 2021-11-16 PROCEDURE — 83970 ASSAY OF PARATHORMONE: CPT | Mod: 59

## 2021-11-16 PROCEDURE — 82607 VITAMIN B-12: CPT

## 2021-11-16 PROCEDURE — 82728 ASSAY OF FERRITIN: CPT

## 2021-11-16 PROCEDURE — 82746 ASSAY OF FOLIC ACID SERUM: CPT

## 2021-11-16 PROCEDURE — 84630 ASSAY OF ZINC: CPT | Mod: 90

## 2021-11-16 PROCEDURE — 99000 SPECIMEN HANDLING OFFICE-LAB: CPT

## 2021-11-17 LAB — DEPRECATED CALCIDIOL+CALCIFEROL SERPL-MC: 94 UG/L (ref 30–80)

## 2021-11-19 LAB
ANNOTATION COMMENT IMP: NORMAL
RETINYL PALMITATE SERPL-MCNC: 0.05 MG/L
VIT A SERPL-MCNC: 0.52 MG/L
ZINC SERPL-MCNC: 65.4 UG/DL

## 2021-11-20 LAB — VIT B1 PYROPHOSHATE BLD-SCNC: 115 NMOL/L

## 2021-11-23 LAB
DEPRECATED CALCIDIOL+CALCIFEROL SERPL-MC: <86 UG/L (ref 20–75)
VITAMIN D2 SERPL-MCNC: <5 UG/L
VITAMIN D3 SERPL-MCNC: 81 UG/L

## 2021-11-30 ENCOUNTER — OFFICE VISIT (OUTPATIENT)
Dept: SURGERY | Facility: CLINIC | Age: 31
End: 2021-11-30
Payer: COMMERCIAL

## 2021-11-30 VITALS
BODY MASS INDEX: 32.66 KG/M2 | HEIGHT: 64 IN | SYSTOLIC BLOOD PRESSURE: 99 MMHG | WEIGHT: 191.3 LBS | DIASTOLIC BLOOD PRESSURE: 76 MMHG

## 2021-11-30 DIAGNOSIS — K91.2 POSTOPERATIVE MALABSORPTION: Primary | ICD-10-CM

## 2021-11-30 DIAGNOSIS — R63.2 HYPERPHAGIA: ICD-10-CM

## 2021-11-30 PROCEDURE — 99214 OFFICE O/P EST MOD 30 MIN: CPT | Performed by: FAMILY MEDICINE

## 2021-11-30 RX ORDER — ONDANSETRON 4 MG/1
TABLET, ORALLY DISINTEGRATING ORAL
COMMUNITY
Start: 2021-04-08 | End: 2022-05-26

## 2021-11-30 RX ORDER — ERGOCALCIFEROL 1.25 MG/1
50000 CAPSULE ORAL
COMMUNITY

## 2021-11-30 RX ORDER — PHENTERMINE HYDROCHLORIDE 37.5 MG/1
18.75-37.5 TABLET ORAL
Qty: 90 TABLET | Refills: 1 | Status: SHIPPED | OUTPATIENT
Start: 2021-11-30 | End: 2022-02-28

## 2021-11-30 ASSESSMENT — MIFFLIN-ST. JEOR: SCORE: 1567.73

## 2021-11-30 NOTE — LETTER
11/30/2021         RE: Russell Gomez  [address] [place] [address]        Dear Colleague,    Thank you for referring your patient, Russell Gomez, to the [facility] [place]. Please see a copy of my visit note below.    Bariatric Follow Up Visit with a History of Previous Bariatric Surgery     Date of visit: 11/30/2021  Physician: Jinny Pierre MD, MD  Primary Care Provider:  Lali Humphries   31 year old  female    Date of Surgery: 10/27/2020  Initial Weight: 266#  Initial BMI: 45.74  Today's Weight:   Wt Readings from Last 1 Encounters:   11/30/21 86.8 kg (191 lb 4.8 oz)     Body mass index is 32.84 kg/m .      Assessment and Plan     Assessment: Russell is a 31 year old year old female who is 1 yr s/p  Sleeve Gastrectomy with Dr. Rossi Gomez feels as if she has achieved the goals she hoped to accomplish through bariatric surgery and weight loss.  She is one month post partum. She had a baby girl. Her son is 10    Encounter Diagnoses   Name Primary?     Postoperative malabsorption Yes     Hyperphagia          Current Outpatient Medications:      acetaminophen (TYLENOL) 500 MG tablet, Take 1-2 tablets (500-1,000 mg) by mouth every 6 hours as needed for mild pain, Disp: 60 tablet, Rfl: 0     calcium carbonate (OS-BRYANNA) 500 MG tablet, Take 1 tablet by mouth 2 times daily, Disp: , Rfl:      cyanocobalamin (VITAMIN B-12) 1000 MCG SUBL sublingual tablet, Place 1,000 mcg under the tongue daily, Disp: , Rfl:      ergocalciferol (ERGOCALCIFEROL) 1.25 MG (90648 UT) capsule, Take 50,000 Units by mouth, Disp: , Rfl:      ondansetron (ZOFRAN-ODT) 4 MG ODT tab, DISSOLVE 1 TABLET IN MOUTH EVERY 8 HOURS AS NEEDED, Disp: , Rfl:      Pediatric Multi Vit-Extra C-FA (FLINTSTONES/EXTRA C) CHEW, Take 1 tablet by mouth, Disp: , Rfl:      phentermine (ADIPEX-P) 37.5 MG tablet, Take 0.5-1 tablets (18.75-37.5 mg) by mouth every morning  "(before breakfast), Disp: 90 tablet, Rfl: 1     Vitamin D, Cholecalciferol, 25 MCG (1000 UT) TABS, , Disp: , Rfl:     Plan: Continue vitamins with consistency. Protect your sleep. Phentermine 1/2 tab to 1 tab daily    Return in about 6 months (around 5/30/2022).    Bariatric Surgery Review     Interim History/LifeChanges: one month post partum. Back to work. Had post partum depression. Son is 10 yo. Father of baby, her fiance has an 17 yo daughter who helps on the weekends. It was a hard pregnancy. Was 178# prior to her pregnancy. She is not breast feeding. Was on bedrest most of her pregnancy.    Patient Concerns: appetite is unstoppable  Appetite (1-10): high!  GERD: no    Medication changes: none    Vitamin Intake:   B-12   SL   MVI  2/MVI   Vitamin D  5,000   Calcium   oscal     Other                LABS: \"Reviewed  hgb low o/w excellent  Nausea no  Vomiting no  Constipation no  Diarrhea no  Rashes no  Hair Loss no  Calf tenderness no  Breathing difficulty no  Reactive Hypoglycemia no  Light Headedness no   Moods post partum depression    12 point ROS as above and otherwise negative      Habits:  Alcohol: no  Tobacco: no  Caffeine occ  NSAIDS no  Exercise Routine: dancing  3 meals/day yes  Protein first yes  ? grams/day  Water Separate from meals yes  Calorie Containing Beverages no  Restaurant eating/wk no  Sleeping interrupted by baby  Stress high  CPAP: NA  Contraception: TL  DEXA:not indicated d/t age ,45    Social History     Social History     Socioeconomic History     Marital status: Single     Spouse name: Not on file     Number of children: Not on file     Years of education: Not on file     Highest education level: Not on file   Occupational History     Not on file   Tobacco Use     Smoking status: Never Smoker     Smokeless tobacco: Never Used   Substance and Sexual Activity     Alcohol use: No     Drug use: No     Sexual activity: Yes     Partners: Male     Birth control/protection: Condom   Other " Topics Concern     Not on file   Social History Narrative    Single, one son, Daughter born 10/2021 Working at [employer].  Son's father is in [place] and not involved  Close with her father who lives in [place]  Not close with her mother who lives here   Miscarried twin boys       Social Determinants of Health     Financial Resource Strain: Not on file   Food Insecurity: Not on file   Transportation Needs: Not on file   Physical Activity: Not on file   Stress: Not on file   Social Connections: Not on file   Intimate Partner Violence: Not on file   Housing Stability: Not on file       Past Medical History     Past Medical History:   Diagnosis Date     Acanthosis nigricans      Accessory skin tags      Anemia      Asthma      Bacterial vaginosis 2019    3/21/19     Cervical insufficiency during pregnancy in second trimester, antepartum 2019     Chlamydia      Depression      Dichorionic diamniotic twin pregnancy in second trimester 2019     Diet controlled gestational diabetes mellitus (GDM) in second trimester 2019     Gestational diabetes      Gonococcal infection (acute) of lower genitourinary tract     Created by Conversion      Heartburn      Herpes simplex      Hirsutism      History of  delivery, currently pregnant in first trimester 2019     Low back pain      Menorrhagia      Morbid obesity (H)       labor 2011     Single liveborn infant delivered vaginally 10/13/2021     Status post bilateral salpingectomy 10/13/2021     Problem List     Patient Active Problem List   Diagnosis     Morbid obesity (H)     Anemia     Rh negative state in antepartum period, second trimester     History of bariatric surgery     History of gestational diabetes mellitus     Status post bilateral salpingectomy     Single liveborn infant delivered vaginally     Medications     [unfilled]  Surgical History     Past Surgical History  She has a past surgical history that includes  "Tonsillectomy; Pr Lap, Chanel Restrict Proc, Longitudinal Gastrectomy (N/A, 10/27/2020); and Laparotomy mini, tubal ligation (post partum), combined (Bilateral, 10/11/2021).    Objective-Exam     Constitutional:  BP 99/76 (BP Location: Right arm, Patient Position: Sitting, Cuff Size: Adult Regular)   Ht 1.626 m (5' 4\")   Wt 86.8 kg (191 lb 4.8 oz)   LMP 01/23/2021   BMI 32.84 kg/m      General:  Pleasant and in no acute distress   Eyes:  EOMI  ENT:  Airway 1+  Moist mucous membranes  Neck:  Supple, No LAD, No thyromegaly, No carotid bruits appreciated  Respiratory: Normal respiratory effort, no cough, wheezes or crackles  CV:  Regular rate and Rhythm,no murmurs, pulses 2+, no calf tenderness, no LE edema  Gastrointestinal: Abdomen NT/ND, BS+  Musculoskeletal: muscle mass WNL  Skin: color normal hair pulled back, incisions nicely healed  Neurological: No tremor, normal gait  Psychiatric: alert and oriented X3, mood and affect normal    Counseling     We reviewed the important post op bariatric recommendations:  -eating 3 meals daily  -eating protein first, getting >60gm protein daily  -eating slowly, chewing food well  -avoiding/limiting calorie containing beverages  -drinking water 15-30 minutes before or after meals  -choosing wheat, not white with breads, crackers, pastas, edwina, bagels, tortillas, rice  -limiting restaurant or cafeteria eating to twice a week or less    We discussed the importance of restorative sleep and stress management in maintaining a healthy weight.  We discussed the National Weight Control Registry healthy weight maintenance strategies and ways to optimize metabolism.  We discussed the importance of physical activity including cardiovascular and strength training in maintaining a healthier weight.    We discussed the importance of life-long vitamin supplementation and life-long  follow-up.    Russell was reminded that, to avoid marginal ulcers she should avoid tobacco at all, alcohol in " excess, caffeine in excess, and NSAIDS (unless indicated for cardioprotection or othewise and opposed by a PPI).    Jinny Pierre MD, MD, [facility].  11/30/2021  11:33 AM      No images are attached to the encounter.  Total time spent on the date of this encounter doing: chart review, review of test results, patient visit, physical exam, education, counseling, developing plan of care, and documenting = 30 minutes.      Again, thank you for allowing me to participate in the care of your patient.        Sincerely,        Jinny Pierre MD

## 2021-11-30 NOTE — PATIENT INSTRUCTIONS
[facility]  Nutritional Guidelines  Gastric Sleeve 12 Months Post Op    General Guidelines and Helpful Hints:    Eat 3 meals per day + protein supplement(s). No snacks between meals.  o Do not skip meals.  This can cause overeating at the next meal and will prevent adequate protein and nutritional intake.    Aim for 60-80 grams of protein per day.  o Always eat your protein first. This assists with optimal nutrition and helps you stay full longer.  o Depending on your portion size, you may need to drink approved protein supplement between meals to achieve protein goals. Follow recommendations of your Dietitian.     Eat your protein first, and then follow with fiber.   o It is not necessary to count your fiber, but 15-20 grams per day is recommended.    o Add fiber by including fruits, vegetables, whole grains, and beans.     Portions should be about 1 cup per meal. Use measuring cups to be accurate.    Continue to use saucer/salad plates, infant/toddler silverware to keep portion sizes small and take small bites.    Eat S-L-O-W-L-Y to make each meal last 20-30 minutes. Always stop eating when satisfied.    Continue to use caution with foods containing skins, peels or membranes. Chew well!    Aim for 64 oz. of calorie-free fluids daily.  o Continue to avoid caffeine and carbonation. If you choose to drink alcohol, do so in moderation.   o Remember to avoid drinking during meals, 15-30 minutes before and 30 minutes after.    Exercise is burnham for continued weight loss and weight maintenance. Aim for 30-60 minutes of physical activity most days of the week. Include cardiovascular and strength training.    If having trouble tolerating meat, try using a crock-pot, tinfoil tent, steamer or other moist cooking method to create tender meats. Add broth or low-fat gravy to help meat stay moist.     Avoid high sugar and high fat foods to prevent high calorie intake. This will reduce your rate of weight loss and  can cause weight regain.   o Check nutrition labels for less than 10 grams of sugar and less than 10 grams of fat per serving.    Continue Taking Vitamins/Minerals:  o 5698-4257 mcg of Sublingual B-12 daily  o 1 Multivitamin with Iron twice daily (chewable or swallow tabs)  o 500-600 mg Calcium Citrate twice daily (chewable or swallow tabs)  o 5000 IU Vitamin D3 daily    Sample Grocery List    Protein:    Fat free Greek or light yogurt (less than 10 grams sugar)    Fat free or low-fat cottage cheese    String cheese or reduced fat cheese slices    Tuna, salmon, crab, egg, or chicken salad made with light or fat free mayonnaise    Egg or Egg Substitute    Lean/extra lean turkey, beef, bison, venison (ground, sirloin, round, flank)    Pork loin or tenderloin (grilled, baked, broiled)    Fish such as salmon, tuna, trout, tilapia, etc. (grilled, baked, broiled)    Tender cuts of lean (skinless) turkey or chicken    Lean deli meats: turkey, lean ham, chicken, lean roast beef    Beans such as kidney, garbanzo, black, rachel, or low-fat/fat free refried beans    Peanut butter (natural preferred). Limit to 1 Tbsp. per day.    Low-fat meatloaf (made with lean ground beef or turkey)    Sloppy Joes made with low-sugar ketchup and lean ground beef or turkey    Soy or vegetable protein (i.e. vegan crumbles, soy/veggie burger, tofu)    Hummus    Vegetables:    Fresh: cooked or raw (as tolerated)    Frozen vegetables    Canned vegetables (low sodium or no salt added, rinse before cooking/eating)    (Ok to have skins/peels/membranes/seeds - just chew well)    Fruits:    Fresh fruit    Frozen fruit (no sugar added)    Canned fruit (packed in its own juice, NOT syrup)    (Ok to have skins/peels/membranes/seeds - just chew well)    Starch:    Unsweetened whole-grain hot cereal (or high fiber cold cereal, dry)    Toasted whole wheat bread or Kanaranzi Thins    Whole grain crackers    Baked/boiled/mashed potato/sweet potato    Cooked  whole grain pasta, brown rice, or other cooked whole grains    Starchy vegetables: corn, peas, winter squash    Protein Supplement:     Ready to drink protein shake with:  o 15-30 grams protein per serving  o Less than 10 grams total carbohydrate per serving     Protein powder mixed with:  o  Skim or 1% milk  o Low fat or fat free Lactaid milk, plain or no sugar added soymilk  o Water     Fats: (use in moderation)    1 teaspoon of soft tub margarine    1 teaspoon olive oil, canola oil, or peanut oil    1 tablespoon of low-fat ghosh or salad dressing     Sample Menu for 12 months after Gastric Sleeve    You do NOT need to eat/drink the full portion sizes listed below  Always stop when you are satisfied    Breakfast   cup 1% cottage cheese     cup diced peaches   Lunch   slice whole grain bread/toast with 1 tsp. light ghosh  2 oz. sliced lean turkey, ham, or chicken    cup carrots   Supplement Approved protein supplement (as needed between meals)   Dinner   cup 93% lean ground beef mixed with 2 Tbsp. marinara sauce     cup green beans    cup whole grain pasta     Breakfast   cup egg substitute or 2 egg whites, scrambled   1 oz low fat shredded cheese    cup sautéed chopped vegetables mixed in   Lunch 1 cup chili made with lean ground beef or turkey   Supplement 6 oz light Greek yogurt (as needed)   Dinner 3 oz  grilled, broiled, or baked lemon pepper salmon  2 Tbsp. grilled asparagus  2 Tbsp. baked sweet potato     Breakfast   cup whole grain oatmeal made with skim milk and unflavored protein powder added    cup blueberries       Lunch 3 oz  meatloaf made with lean ground turkey    cup steamed broccoli   Dinner 3 oz pork loin made in a crock pot seasoned with a spice rub    cup cooked carrots   Supplement Approved protein supplement (as needed between meals)     Breakfast   cup egg scramble made with egg substitute and turkey sausage    whole grain English muffin with 1 teaspoon low sugar jelly   Lunch 3 oz seasoned,  skinless grilled chicken     cup grilled vegetables   Dinner 2 oz lean beef    cup brown rice    cup strawberries   Supplement 1 string cheese (as needed)     Breakfast 6 ounces light Greek yogurt    cup unsweetened mixed berries   Lunch 3 oz shrimp, with low-sugar cocktail sauce for dipping    cup pea pods   Supplement   cup fat free cottage cheese (as needed)   Dinner 3 oz tender turkey breast (made in crock pot for extra moisture)    of a small whole wheat dinner roll     Breakfast     cup low fat cottage cheese    cup strawberries   Lunch   cup black bean soup  4 whole grain crackers   Supplement 1 cup skim milk with scoop of protein powder added (as needed)   Dinner 3 oz. grilled tilapia with lemon pepper seasoning    cup grilled bell peppers     Breakfast 2 ounces turkey sausage johanne    whole wheat English muffin   Supplement Approved protein supplement (as needed between meals)   Lunch 3 oz lean ham, turkey, or chicken     cup tomatoes   Dinner 2 oz. sirloin steak    cup mixed vegetables    cup brown rice

## 2021-11-30 NOTE — PROGRESS NOTES
Bariatric Follow Up Visit with a History of Previous Bariatric Surgery     Date of visit: 11/30/2021  Physician: Jinny Pierre MD, MD  Primary Care Provider:  Lali Humphries   31 year old  female    Date of Surgery: 10/27/2020  Initial Weight: 266#  Initial BMI: 45.74  Today's Weight:   Wt Readings from Last 1 Encounters:   11/30/21 86.8 kg (191 lb 4.8 oz)     Body mass index is 32.84 kg/m .      Assessment and Plan     Assessment: Russell is a 31 year old year old female who is 1 yr s/p  Sleeve Gastrectomy with Dr. Rossi Gomez feels as if she has achieved the goals she hoped to accomplish through bariatric surgery and weight loss.  She is one month post partum. She had a baby girl. Her son is 10    Encounter Diagnoses   Name Primary?     Postoperative malabsorption Yes     Hyperphagia          Current Outpatient Medications:      acetaminophen (TYLENOL) 500 MG tablet, Take 1-2 tablets (500-1,000 mg) by mouth every 6 hours as needed for mild pain, Disp: 60 tablet, Rfl: 0     calcium carbonate (OS-BRYANNA) 500 MG tablet, Take 1 tablet by mouth 2 times daily, Disp: , Rfl:      cyanocobalamin (VITAMIN B-12) 1000 MCG SUBL sublingual tablet, Place 1,000 mcg under the tongue daily, Disp: , Rfl:      ergocalciferol (ERGOCALCIFEROL) 1.25 MG (45497 UT) capsule, Take 50,000 Units by mouth, Disp: , Rfl:      ondansetron (ZOFRAN-ODT) 4 MG ODT tab, DISSOLVE 1 TABLET IN MOUTH EVERY 8 HOURS AS NEEDED, Disp: , Rfl:      Pediatric Multi Vit-Extra C-FA (FLINTSTONES/EXTRA C) CHEW, Take 1 tablet by mouth, Disp: , Rfl:      phentermine (ADIPEX-P) 37.5 MG tablet, Take 0.5-1 tablets (18.75-37.5 mg) by mouth every morning (before breakfast), Disp: 90 tablet, Rfl: 1     Vitamin D, Cholecalciferol, 25 MCG (1000 UT) TABS, , Disp: , Rfl:     Plan: Continue vitamins with consistency. Protect your sleep. Phentermine 1/2 tab to 1 tab daily    Return in about 6 months (around 5/30/2022).    Bariatric  "Surgery Review     Interim History/LifeChanges: one month post partum. Back to work. Had post partum depression. Son is 10 yo. Father of baby, her fiance has an 19 yo daughter who helps on the weekends. It was a hard pregnancy. Was 178# prior to her pregnancy. She is not breast feeding. Was on bedrest most of her pregnancy.    Patient Concerns: appetite is unstoppable  Appetite (1-10): high!  GERD: no    Medication changes: none    Vitamin Intake:   B-12   SL   MVI  2/MVI   Vitamin D  5,000   Calcium   oscal     Other                LABS: \"Reviewed  hgb low o/w excellent  Nausea no  Vomiting no  Constipation no  Diarrhea no  Rashes no  Hair Loss no  Calf tenderness no  Breathing difficulty no  Reactive Hypoglycemia no  Light Headedness no   Moods post partum depression    12 point ROS as above and otherwise negative      Habits:  Alcohol: no  Tobacco: no  Caffeine occ  NSAIDS no  Exercise Routine: dancing  3 meals/day yes  Protein first yes  ? grams/day  Water Separate from meals yes  Calorie Containing Beverages no  Restaurant eating/wk no  Sleeping interrupted by baby  Stress high  CPAP: NA  Contraception: TL  DEXA:not indicated d/t age ,45    Social History     Social History     Socioeconomic History     Marital status: Single     Spouse name: Not on file     Number of children: Not on file     Years of education: Not on file     Highest education level: Not on file   Occupational History     Not on file   Tobacco Use     Smoking status: Never Smoker     Smokeless tobacco: Never Used   Substance and Sexual Activity     Alcohol use: No     Drug use: No     Sexual activity: Yes     Partners: Male     Birth control/protection: Condom   Other Topics Concern     Not on file   Social History Narrative    Single, one son, Daughter born 10/2021 Working at [employer].  Son's father is in [place] and not involved  Close with her father who lives in [place]  Not close with her mother who lives here   Miscarried twin boys   "     Social Determinants of Health     Financial Resource Strain: Not on file   Food Insecurity: Not on file   Transportation Needs: Not on file   Physical Activity: Not on file   Stress: Not on file   Social Connections: Not on file   Intimate Partner Violence: Not on file   Housing Stability: Not on file       Past Medical History     Past Medical History:   Diagnosis Date     Acanthosis nigricans      Accessory skin tags      Anemia      Asthma      Bacterial vaginosis 2019    3/21/19     Cervical insufficiency during pregnancy in second trimester, antepartum 2019     Chlamydia      Depression      Dichorionic diamniotic twin pregnancy in second trimester 2019     Diet controlled gestational diabetes mellitus (GDM) in second trimester 2019     Gestational diabetes      Gonococcal infection (acute) of lower genitourinary tract     Created by Conversion      Heartburn      Herpes simplex      Hirsutism      History of  delivery, currently pregnant in first trimester 2019     Low back pain      Menorrhagia      Morbid obesity (H)       labor 2011     Single liveborn infant delivered vaginally 10/13/2021     Status post bilateral salpingectomy 10/13/2021     Problem List     Patient Active Problem List   Diagnosis     Morbid obesity (H)     Anemia     Rh negative state in antepartum period, second trimester     History of bariatric surgery     History of gestational diabetes mellitus     Status post bilateral salpingectomy     Single liveborn infant delivered vaginally     Medications     [unfilled]  Surgical History     Past Surgical History  She has a past surgical history that includes Tonsillectomy; Pr Lap, Chanel Restrict Proc, Longitudinal Gastrectomy (N/A, 10/27/2020); and Laparotomy mini, tubal ligation (post partum), combined (Bilateral, 10/11/2021).    Objective-Exam     Constitutional:  BP 99/76 (BP Location: Right arm, Patient Position: Sitting, Cuff  "Size: Adult Regular)   Ht 1.626 m (5' 4\")   Wt 86.8 kg (191 lb 4.8 oz)   LMP 01/23/2021   BMI 32.84 kg/m      General:  Pleasant and in no acute distress   Eyes:  EOMI  ENT:  Airway 1+  Moist mucous membranes  Neck:  Supple, No LAD, No thyromegaly, No carotid bruits appreciated  Respiratory: Normal respiratory effort, no cough, wheezes or crackles  CV:  Regular rate and Rhythm,no murmurs, pulses 2+, no calf tenderness, no LE edema  Gastrointestinal: Abdomen NT/ND, BS+  Musculoskeletal: muscle mass WNL  Skin: color normal hair pulled back, incisions nicely healed  Neurological: No tremor, normal gait  Psychiatric: alert and oriented X3, mood and affect normal    Counseling     We reviewed the important post op bariatric recommendations:  -eating 3 meals daily  -eating protein first, getting >60gm protein daily  -eating slowly, chewing food well  -avoiding/limiting calorie containing beverages  -drinking water 15-30 minutes before or after meals  -choosing wheat, not white with breads, crackers, pastas, edwina, bagels, tortillas, rice  -limiting restaurant or cafeteria eating to twice a week or less    We discussed the importance of restorative sleep and stress management in maintaining a healthy weight.  We discussed the National Weight Control Registry healthy weight maintenance strategies and ways to optimize metabolism.  We discussed the importance of physical activity including cardiovascular and strength training in maintaining a healthier weight.    We discussed the importance of life-long vitamin supplementation and life-long  follow-up.    Russell was reminded that, to avoid marginal ulcers she should avoid tobacco at all, alcohol in excess, caffeine in excess, and NSAIDS (unless indicated for cardioprotection or othewise and opposed by a PPI).    Jinny Pierre MD, MD, FAAFP  [facility].  11/30/2021  11:33 AM      No images are attached to the encounter.  Total time spent " on the date of this encounter doing: chart review, review of test results, patient visit, physical exam, education, counseling, developing plan of care, and documenting = 30 minutes.

## 2021-12-21 ENCOUNTER — VIRTUAL VISIT (OUTPATIENT)
Dept: SURGERY | Facility: CLINIC | Age: 31
End: 2021-12-21
Payer: COMMERCIAL

## 2021-12-21 VITALS — WEIGHT: 184 LBS | HEIGHT: 64 IN | BODY MASS INDEX: 31.41 KG/M2

## 2021-12-21 DIAGNOSIS — Z98.84 BARIATRIC SURGERY STATUS: Primary | ICD-10-CM

## 2021-12-21 DIAGNOSIS — Z71.3 NUTRITIONAL COUNSELING: ICD-10-CM

## 2021-12-21 DIAGNOSIS — E66.811 OBESITY, CLASS I, BMI 30.0-34.9 (SEE ACTUAL BMI): ICD-10-CM

## 2021-12-21 PROCEDURE — 97803 MED NUTRITION INDIV SUBSEQ: CPT | Mod: 95 | Performed by: DIETITIAN, REGISTERED

## 2021-12-21 ASSESSMENT — MIFFLIN-ST. JEOR: SCORE: 1534.62

## 2021-12-21 NOTE — LETTER
12/21/2021         RE: Russell Gomez  [address] [place] [address]        Dear Colleague,    Thank you for referring your patient, Russell Gomez, to the [facility] [place]. Please see a copy of my visit note below.    Russell Gomez is a 31 year old who is being evaluated via a billable video visit.      How would you like to obtain your AVS? MyChart  If the video visit is dropped, the invitation should be resent by: Send to e-mail at: [email or web address]  Will anyone else be joining your video visit? No      Video Start Time: 3:30p      Post-op Surgical Weight Loss Diet Evaluation     Assessment:  Pt presents for 1 year post-op RD visit, s/p LSG on 10-27-20 with Dr. Richey. Today we reviewed current eating habits and level of physical activity, and instructed on the changes that are required for successful bariatric outcomes.    Patient Progress: states eating is inconsistent- sometimes will eat things she isn't supposed to eat  +drinking a lot of protein shake- eating dinner only  +phentermine    Initial weight: 266lb  Current weight: 184lb  Weight change: down 82lb from pre-surgical weight    Body mass index is 31.58 kg/m .    Patient Active Problem List   Diagnosis     Morbid obesity (H)     Anemia     Rh negative state in antepartum period, second trimester     History of bariatric surgery     History of gestational diabetes mellitus     Status post bilateral salpingectomy     Single liveborn infant delivered vaginally       Vitamins   Multi Vit with Iron: yes  Calcium Citrate: yes  B12: yes  D3: yes    Do you experience hunger: yes  Do you experience any reflux or discomfort with eating? yes  Nausea: yes  Vomiting: yes- nausea after most foods  Diarrhea: no  Constipation: no  Hair loss:no    Diet Recall/Time:   Breakfast: protein shake   Lunch: protein shake  Dinner: microwave cheese pizza- only able to have a little bit  +states she is really tired  "    Typical Snacks: whole grain crackers    Meal Duration:30 min     Fluid-meal separation:  Fluids are  30min before and 30 minutes after meals.    Fluid Intake  Water: only drinking water- states she is drinking a lot   +was drinking a lot of cranberry juice- but has since stopped that    Exercise: daily- 30-60 min- dancing      PES statement:      (NC-1.4) Altered GI Function related to Alteration in gastrointestinal tract structure and/or function/ Decreased functional length of the GI tract as evidenced by Weight loss of 30.83% initial body weight; Gastric bypass surgery; sleeve gastrectomy  (NB-1.7) Undesirable food choices related to Failure to adjust for lifestyle changes as evidenced by low protein intake at meals; large portions; skipping meals; frequent grazing;  mindless eating ; drinking with meals, not chewing each bite to applesauce consistency; consuming caffeine/carbonation daily, frequent restaurant intake; and no structured activity regimen      Intervention    Discussion  1. Discussed 1 year Post-Op Nutritional Guidelines for LSG  2. Recommended to consume 15-20gm protein at 3 meals daily, along with protein supplement/\"planned protein containing snack\" of 15-30gm protein, to reach goal of 60-80 gm protein daily.  3. Reviewed lean protein sources  4. Bariatric Plate Method-  including lean/low fat protein at each meal, including a vegetable/fruit, and limiting carbohydrate intake to less than 25% of plate volume.   5. Discussed trying to step down to soft solids for a little bit in order to ease herself into more solid and textured foods- encouraged her to track foods to determine foods she can and cannot tolerate    Instructions  1. Include 15-20gm protein at each meal, along with protein supplement/\"planned protein containing snack\" of 15-30gm protein, to reach goal of 60-80 gm protein daily.  2. Increase fluid intake to 64oz daily: choose plain or calorie/carbonation-free " beverages.  3. Incorporate daily structured activity, 30-60 minutes most days of the week  4. Recommended pt to start taking: Multi Vit + iron 2x/day, calcium citrate 400-600 mg 2x/day, 9571-4118 mcg of Sublingual B-12 daily, and 5000 IU Vitamin D3 daily. (MVI and calcium can be taken at the same time)  5. Read food labels more consistently: keeping total fat grams <10, total sugar grams <10, fiber >3gm per serving.  6. Increase vegetable/fruit intake, by having a vegetable or fruit with each meal daily.  7. Practice plate method: 1/2 plate lean/low fat protein source, vegetable/fruit, <25% of plate complex carbohydrates.  8. Separate fluids 30 minutes before/after meal times.  9. Practice eating off of smaller plates/bowls, chewing to applesauce consistency, taking 20-30 minutes to eat in a calm/relaxed environment without distractions of tv/email/cell phone.    Handouts provided:  Soft Solids Diet plan    Assessment/Plan:    Pt to follow up for 18 months post-op visit with bariatrician     Video-Visit Details    Type of service:  Video Visit    Video End Time:4:00p    Originating Location (pt. Location): Home    Distant Location (provider location):  [facility] [place]     Platform used for Video Visit: Bob CARDENAS RD          Again, thank you for allowing me to participate in the care of your patient.        Sincerely,        MORAIMA CARDENAS RD

## 2021-12-21 NOTE — PROGRESS NOTES
Russell Gomez is a 31 year old who is being evaluated via a billable video visit.      How would you like to obtain your AVS? MyChart  If the video visit is dropped, the invitation should be resent by: Send to e-mail at: [email or web address]  Will anyone else be joining your video visit? No      Video Start Time: 3:30p      Post-op Surgical Weight Loss Diet Evaluation     Assessment:  Pt presents for 1 year post-op RD visit, s/p LSG on 10-27-20 with Dr. Richey. Today we reviewed current eating habits and level of physical activity, and instructed on the changes that are required for successful bariatric outcomes.    Patient Progress: states eating is inconsistent- sometimes will eat things she isn't supposed to eat  +drinking a lot of protein shake- eating dinner only  +phentermine    Initial weight: 266lb  Current weight: 184lb  Weight change: down 82lb from pre-surgical weight    Body mass index is 31.58 kg/m .    Patient Active Problem List   Diagnosis     Morbid obesity (H)     Anemia     Rh negative state in antepartum period, second trimester     History of bariatric surgery     History of gestational diabetes mellitus     Status post bilateral salpingectomy     Single liveborn infant delivered vaginally       Vitamins   Multi Vit with Iron: yes  Calcium Citrate: yes  B12: yes  D3: yes    Do you experience hunger: yes  Do you experience any reflux or discomfort with eating? yes  Nausea: yes  Vomiting: yes- nausea after most foods  Diarrhea: no  Constipation: no  Hair loss:no    Diet Recall/Time:   Breakfast: protein shake   Lunch: protein shake  Dinner: microwave cheese pizza- only able to have a little bit  +states she is really tired     Typical Snacks: whole grain crackers    Meal Duration:30 min     Fluid-meal separation:  Fluids are  30min before and 30 minutes after meals.    Fluid Intake  Water: only drinking water- states she is drinking a lot   +was drinking a lot of cranberry juice- but has  "since stopped that    Exercise: daily- 30-60 min- dancing      PES statement:      (NC-1.4) Altered GI Function related to Alteration in gastrointestinal tract structure and/or function/ Decreased functional length of the GI tract as evidenced by Weight loss of 30.83% initial body weight; Gastric bypass surgery; sleeve gastrectomy  (NB-1.7) Undesirable food choices related to Failure to adjust for lifestyle changes as evidenced by low protein intake at meals; large portions; skipping meals; frequent grazing;  mindless eating ; drinking with meals, not chewing each bite to applesauce consistency; consuming caffeine/carbonation daily, frequent restaurant intake; and no structured activity regimen      Intervention    Discussion  1. Discussed 1 year Post-Op Nutritional Guidelines for LSG  2. Recommended to consume 15-20gm protein at 3 meals daily, along with protein supplement/\"planned protein containing snack\" of 15-30gm protein, to reach goal of 60-80 gm protein daily.  3. Reviewed lean protein sources  4. Bariatric Plate Method-  including lean/low fat protein at each meal, including a vegetable/fruit, and limiting carbohydrate intake to less than 25% of plate volume.   5. Discussed trying to step down to soft solids for a little bit in order to ease herself into more solid and textured foods- encouraged her to track foods to determine foods she can and cannot tolerate    Instructions  1. Include 15-20gm protein at each meal, along with protein supplement/\"planned protein containing snack\" of 15-30gm protein, to reach goal of 60-80 gm protein daily.  2. Increase fluid intake to 64oz daily: choose plain or calorie/carbonation-free beverages.  3. Incorporate daily structured activity, 30-60 minutes most days of the week  4. Recommended pt to start taking: Multi Vit + iron 2x/day, calcium citrate 400-600 mg 2x/day, 2984-6763 mcg of Sublingual B-12 daily, and 5000 IU Vitamin D3 daily. (MVI and calcium can be taken at " the same time)  5. Read food labels more consistently: keeping total fat grams <10, total sugar grams <10, fiber >3gm per serving.  6. Increase vegetable/fruit intake, by having a vegetable or fruit with each meal daily.  7. Practice plate method: 1/2 plate lean/low fat protein source, vegetable/fruit, <25% of plate complex carbohydrates.  8. Separate fluids 30 minutes before/after meal times.  9. Practice eating off of smaller plates/bowls, chewing to applesauce consistency, taking 20-30 minutes to eat in a calm/relaxed environment without distractions of tv/email/cell phone.    Handouts provided:  Soft Solids Diet plan    Assessment/Plan:    Pt to follow up for 18 months post-op visit with bariatrician     Video-Visit Details    Type of service:  Video Visit    Video End Time:4:00p    Originating Location (pt. Location): Home    Distant Location (provider location):  [facility] [place]     Platform used for Video Visit: Bob CARDENAS RD

## 2022-01-11 ENCOUNTER — E-VISIT (OUTPATIENT)
Dept: URGENT CARE | Facility: URGENT CARE | Age: 32
End: 2022-01-11
Payer: COMMERCIAL

## 2022-01-11 DIAGNOSIS — Z20.822 SUSPECTED COVID-19 VIRUS INFECTION: Primary | ICD-10-CM

## 2022-01-11 PROCEDURE — 99207 PR NO CHARGE LOS: CPT | Performed by: PHYSICIAN ASSISTANT

## 2022-01-11 NOTE — PATIENT INSTRUCTIONS
May,      Based on your responses, you may have coronavirus (COVID-19). This illness can cause fever, cough and trouble breathing. Many people get a mild case and get better on their own. Some people can get very sick.    Will I be tested for COVID-19?  We would like to test you for COVID-19 virus. I have placed orders for this test.     To schedule: go to your [employer] home page and scroll down to the section that says  You have an appointment that needs to be scheduled  and click the large green button that says  Schedule Now  and follow the steps to find the next available openings.    If you are unable to complete these [employer] scheduling steps, please call [phone] to schedule your testing.     Return to work/school/ guidance:  Please let your workplace manager and staffing office know when your isolation ends.       If you receive a positive COVID-19 test result, follow the guidance of the those who are giving you the results. Usually the return to work is 10 days from symptom onset or positive test date, (or in some cases 20 days if you are immunocompromised). If your symptoms started after your positive test, the 10 days should start when your symptoms started.   o If you work at [employer], you must also be cleared by Employee Occupational Health and Safety to return to work.      If you receive a negative COVID-19 test result and did not have a high risk exposure to someone with a known positive COVID-19 test, you can return to work once you're free of fever for 24 hours without fever-reducing medication and your symptoms are improving or resolved.    If you receive a negative COVID-19 test and had a high-risk exposure to someone who has tested positive for COVID-19 then you can return to work 14 days after your last contact with the positive individual. Follow quarantine guidance given by your doctor or public health officials.     Sign up for GetWell Loop:  We know it's scary to hear  that you might have COVID-19. We want to track your symptoms to make sure you're okay over the next 2 weeks. Please look for an email from [employer]--this is a free, online program that we'll use to keep in touch. To sign up, follow the link in the email you will receive. Learn more at [email or web address]    How can I take care of myself?  Over the counter medications may help with your symptoms like congestion, cough, chills, or fever.    There are not many effective prescription treatments for early COVID-19. Hydroxychloroquine, ivermectin, and azithromycin are not effective or recommended for COVID-19.    If your symptoms started in the last 10 days, you may be able to receive a treatment with monoclonal antibodies. This treatment can lower your risk of severe illness and going to the hospital. It is given through an IV or under your skin (subcutaneous) and must be given at an infusion center. You must be 12 or older, weight at least 88 pounds, and have a positive COVID-19 test.     If you would like to sign up to be considered to receive the monoclonal antibody medicine, please complete a participation form through the [facility] here: MNRAP ([email or web address]). You may also call the [facility] COVID-19 Public Hotline at 1-580.625.9884 (open Mon-Fri: 9am-7pm and Sat: 10am-6pm).     Not all people who are eligible will receive the medicine, since supply is limited. You will be contacted in the next 1 to 2 business days only if you are selected. If you do not receive a call, you have not been selected to receive the medicine. If you have any questions about this medication, please contact your primary care provider. For more information, see [email or web address]/[email or web address]      Get lots of rest. Drink extra fluids (unless a doctor has told you not to)    Take Tylenol (acetaminophen) or ibuprofen for fever or  pain. If you have liver or kidney problems, ask your family doctor if it's okay to take Tylenol o ibuprofen    Take over the counter medications for your symptoms, as directed by your doctor. You may also talk to your pharmacist.      If you have other health problems (like cancer, heart failure, an organ transplant or severe kidney disease): Call your specialty clinic if you don't feel better in the next 2 days.    Know when to call 911. Emergency warning signs include:  o Trouble breathing or shortness of breath  o Pain or pressure in the chest that doesn't go away  o Feeling confused like you haven't felt before, or not being able to wake up  o Bluish-colored lips or face    Where can I get more information?    [facility] - About COVID-19: [email or web address]     CDC - What to Do If You're Sick:     www.cdc.gov/coronavirus/2019-ncov/about/steps-when-sick.html    CDC - Ending Home Isolation:  https://www.cdc.gov/coronavirus/2019-ncov/your-health/quarantine-isolation.html    CDC - Caring for Someone:  www.cdc.gov/coronavirus/2019-ncov/if-you-are-sick/care-for-someone.html    [facility] clinical trials (COVID-19 research studies): clinicalaffairs.[facility]-clinical-trials    Below are the COVID-19 hotlines at the [facility] of Health ([facility]). Interpreters are available.  o For health questions: Call 288-322-3625 or 1-938.295.8621 (7 a.m. to 7 p.m.)  o For questions about schools and childcare: Call 756-818-3899 or 1-355.282.6281 (7 a.m. to 7 p.m.)  January 11, 2022  RE:  Pauletterubio HERNANDEZ Patricia                                                                                                                  [address] [place] [address]      To whom it may concern:    I evaluated Russell Gomez on January 11, 2022. Russell Gomez should be excused from work/school.     They should let their workplace manager and staffing office know when their quarantine ends.    We can not  give an exact date as it depends on the information below. They can calculate this on their own or work with their manager/staffing office to calculate this. (For example if they were exposed on 10/04, they would have to quarantine for 14 full days. That would be through 10/18. They could return on 10/19.)    Quarantine Guidelines:    If patient receives a positive COVID-19 test result, they should follow the guidance of those who are giving the results. Usually the return to work is 10 (or in some cases 20 days from symptom onset.) If they work at [employer], they must be cleared by Employee Occupational Health and Safety to return to work.      If patient receives a negative COVID-19 test result and did not have a high risk exposure to someone with a known positive COVID-19 test, they can return to work once they're free of fever for 24 hours without fever-reducing medication and their symptoms are improving or resolved.    If patient receives a negative COVID-19 test and if they had a high risk exposure to someone who has tested positive for COVID-19 then they can return to work 14 days after their last contact with the positive individual    Note: If there is ongoing exposure to the covid positive person, this quarantine period may be longer than 14 days. (For example, if they are continually exposed to their child, who tested positive and cannot isolate from them, then the quarantine of 7-14 days can't start until their child is no longer contagious. This is typically 10 days from onset to the child's symptoms. So the total duration may be 17-24 days in this case.)     Sincerely,  Joseph Lundberg PA-C          o

## 2022-01-15 ENCOUNTER — LAB (OUTPATIENT)
Dept: FAMILY MEDICINE | Facility: CLINIC | Age: 32
End: 2022-01-15
Attending: PHYSICIAN ASSISTANT
Payer: COMMERCIAL

## 2022-01-15 DIAGNOSIS — Z20.822 SUSPECTED COVID-19 VIRUS INFECTION: ICD-10-CM

## 2022-01-15 PROCEDURE — U0005 INFEC AGEN DETEC AMPLI PROBE: HCPCS

## 2022-01-15 PROCEDURE — U0003 INFECTIOUS AGENT DETECTION BY NUCLEIC ACID (DNA OR RNA); SEVERE ACUTE RESPIRATORY SYNDROME CORONAVIRUS 2 (SARS-COV-2) (CORONAVIRUS DISEASE [COVID-19]), AMPLIFIED PROBE TECHNIQUE, MAKING USE OF HIGH THROUGHPUT TECHNOLOGIES AS DESCRIBED BY CMS-2020-01-R: HCPCS

## 2022-01-16 LAB — SARS-COV-2 RNA RESP QL NAA+PROBE: POSITIVE

## 2022-01-17 ENCOUNTER — E-VISIT (OUTPATIENT)
Dept: PEDIATRICS | Facility: CLINIC | Age: 32
End: 2022-01-17
Payer: COMMERCIAL

## 2022-01-17 DIAGNOSIS — Z20.822 SUSPECTED COVID-19 VIRUS INFECTION: Primary | ICD-10-CM

## 2022-01-17 PROCEDURE — 99207 PR NO CHARGE LOS: CPT | Performed by: PHYSICIAN ASSISTANT

## 2022-01-17 NOTE — PATIENT INSTRUCTIONS
May,      Based on your responses, you may have coronavirus (COVID-19). This illness can cause fever, cough and trouble breathing. Many people get a mild case and get better on their own. Some people can get very sick.    Will I be tested for COVID-19?  We would like to test you for COVID-19 virus. I have placed orders for this test.     To schedule: go to your [employer] home page and scroll down to the section that says  You have an appointment that needs to be scheduled  and click the large green button that says  Schedule Now  and follow the steps to find the next available openings.    If you are unable to complete these [employer] scheduling steps, please call [phone] to schedule your testing.     Return to work/school/ guidance:  Please let your workplace manager and staffing office know when your isolation ends.       If you receive a positive COVID-19 test result, follow the guidance of the those who are giving you the results. Usually the return to work is 10 days from symptom onset or positive test date, (or in some cases 20 days if you are immunocompromised). If your symptoms started after your positive test, the 10 days should start when your symptoms started.   o If you work at [employer], you must also be cleared by Employee Occupational Health and Safety to return to work.      If you receive a negative COVID-19 test result and did not have a high risk exposure to someone with a known positive COVID-19 test, you can return to work once you're free of fever for 24 hours without fever-reducing medication and your symptoms are improving or resolved.    If you receive a negative COVID-19 test and had a high-risk exposure to someone who has tested positive for COVID-19 then you can return to work 14 days after your last contact with the positive individual. Follow quarantine guidance given by your doctor or public health officials.     Sign up for GetWell Loop:  We know it's scary to hear  that you might have COVID-19. We want to track your symptoms to make sure you're okay over the next 2 weeks. Please look for an email from [employer]--this is a free, online program that we'll use to keep in touch. To sign up, follow the link in the email you will receive. Learn more at [email or web address]    How can I take care of myself?  Over the counter medications may help with your symptoms like congestion, cough, chills, or fever.    There are not many effective prescription treatments for early COVID-19. Hydroxychloroquine, ivermectin, and azithromycin are not effective or recommended for COVID-19.    If your symptoms started in the last 10 days, you may be able to receive a treatment with monoclonal antibodies. This treatment can lower your risk of severe illness and going to the hospital. It is given through an IV or under your skin (subcutaneous) and must be given at an infusion center. You must be 12 or older, weight at least 88 pounds, and have a positive COVID-19 test.     If you would like to sign up to be considered to receive the monoclonal antibody medicine, please complete a participation form through the [facility] here: MNRAP ([email or web address]://www.[email or web address].[email or web address].mn.us/diseases/[email or web address]/[email or web address]). You may also call the [facility] COVID-19 Public Hotline at 1-953.473.1102 (open Mon-Fri: 9am-7pm and Sat: 10am-6pm).     Not all people who are eligible will receive the medicine, since supply is limited. You will be contacted in the next 1 to 2 business days only if you are selected. If you do not receive a call, you have not been selected to receive the medicine. If you have any questions about this medication, please contact your primary care provider. For more information, see [email or web address]/[email or web address]/[email or web address]/[email or web address]      Get lots of rest. Drink extra fluids (unless a doctor has told you not to)    Take Tylenol (acetaminophen) or ibuprofen for fever or  pain. If you have liver or kidney problems, ask your family doctor if it's okay to take Tylenol o ibuprofen    Take over the counter medications for your symptoms, as directed by your doctor. You may also talk to your pharmacist.      If you have other health problems (like cancer, heart failure, an organ transplant or severe kidney disease): Call your specialty clinic if you don't feel better in the next 2 days.    Know when to call 911. Emergency warning signs include:  o Trouble breathing or shortness of breath  o Pain or pressure in the chest that doesn't go away  o Feeling confused like you haven't felt before, or not being able to wake up  o Bluish-colored lips or face    Where can I get more information?    [facility] - About COVID-19: [email or web address]     CDC - What to Do If You're Sick:     www.cdc.gov/coronavirus/2019-ncov/about/steps-when-sick.html    CDC - Ending Home Isolation:  https://www.cdc.gov/coronavirus/2019-ncov/your-health/quarantine-isolation.html    CDC - Caring for Someone:  www.cdc.gov/coronavirus/2019-ncov/if-you-are-sick/care-for-someone.html    [facility] clinical trials (COVID-19 research studies): clinicalaffairs.[facility]-clinical-trials    Below are the COVID-19 hotlines at the [facility] of Health ([facility]). Interpreters are available.  o For health questions: Call 607-700-8239 or 1-869.430.5795 (7 a.m. to 7 p.m.)  o For questions about schools and childcare: Call 966-787-7058 or 1-702.144.1713 (7 a.m. to 7 p.m.)  January 17, 2022  RE:  Pauletterubio Gomez                                                                                                                  [address] [place] [address]      To whom it may concern:    I evaluated Russell Gomez on January 17, 2022. Russell Gomez should be excused from work/school.     They should let their workplace manager and staffing office know when their quarantine ends.    We can not  give an exact date as it depends on the information below. They can calculate this on their own or work with their manager/staffing office to calculate this. (For example if they were exposed on 10/04, they would have to quarantine for 14 full days. That would be through 10/18. They could return on 10/19.)    Quarantine Guidelines:    If patient receives a positive COVID-19 test result, they should follow the guidance of those who are giving the results. Usually the return to work is 10 (or in some cases 20 days from symptom onset.) If they work at [employer], they must be cleared by Employee Occupational Health and Safety to return to work.      If patient receives a negative COVID-19 test result and did not have a high risk exposure to someone with a known positive COVID-19 test, they can return to work once they're free of fever for 24 hours without fever-reducing medication and their symptoms are improving or resolved.    If patient receives a negative COVID-19 test and if they had a high risk exposure to someone who has tested positive for COVID-19 then they can return to work 14 days after their last contact with the positive individual    Note: If there is ongoing exposure to the covid positive person, this quarantine period may be longer than 14 days. (For example, if they are continually exposed to their child, who tested positive and cannot isolate from them, then the quarantine of 7-14 days can't start until their child is no longer contagious. This is typically 10 days from onset to the child's symptoms. So the total duration may be 17-24 days in this case.)     Sincerely,  Hans Jeff PA-C          o

## 2022-01-27 ENCOUNTER — LAB (OUTPATIENT)
Dept: FAMILY MEDICINE | Facility: CLINIC | Age: 32
End: 2022-01-27
Attending: PHYSICIAN ASSISTANT
Payer: COMMERCIAL

## 2022-01-27 DIAGNOSIS — Z20.822 SUSPECTED COVID-19 VIRUS INFECTION: ICD-10-CM

## 2022-01-27 PROCEDURE — U0005 INFEC AGEN DETEC AMPLI PROBE: HCPCS | Mod: 90

## 2022-01-27 PROCEDURE — U0003 INFECTIOUS AGENT DETECTION BY NUCLEIC ACID (DNA OR RNA); SEVERE ACUTE RESPIRATORY SYNDROME CORONAVIRUS 2 (SARS-COV-2) (CORONAVIRUS DISEASE [COVID-19]), AMPLIFIED PROBE TECHNIQUE, MAKING USE OF HIGH THROUGHPUT TECHNOLOGIES AS DESCRIBED BY CMS-2020-01-R: HCPCS | Mod: 90

## 2022-01-27 PROCEDURE — 99000 SPECIMEN HANDLING OFFICE-LAB: CPT

## 2022-01-28 LAB — SARS-COV-2 RNA RESP QL NAA+PROBE: NOT DETECTED

## 2022-01-31 ENCOUNTER — TELEPHONE (OUTPATIENT)
Dept: SURGERY | Facility: CLINIC | Age: 32
End: 2022-01-31
Payer: COMMERCIAL

## 2022-01-31 NOTE — TELEPHONE ENCOUNTER
Prior Authorization Retail Medication Request    Medication/Dose: Phentermine 37.5 mg    Insurance Name:  [employer]  Insurance ID:  [id number]    Key: [id number]    Pharmacy Information (if different than what is on RX)  Name:  [facility]  Phone:  [phone]

## 2022-02-04 NOTE — TELEPHONE ENCOUNTER
Central Prior Authorization Team   Phone: [phone]    PA Initiation    Medication: Phentermine 37.5 mg  Insurance Company: [employer] - Phone [phone] Fax [phone]  Pharmacy Filling the Rx: [facility] [address] [place] [address]  Filling Pharmacy Phone: [phone]  Filling Pharmacy Fax:    Start Date: 2/4/2022

## 2022-02-07 NOTE — TELEPHONE ENCOUNTER
Prior Authorization Approval    Authorization Effective Date: 1/5/2022  Authorization Expiration Date: 5/5/2022  Medication: Phentermine 37.5 mg  Approved Dose/Quantity:    Reference #:     Insurance Company: [employer] - Phone [phone] Fax [phone]  Expected CoPay:       CoPay Card Available:      Foundation Assistance Needed:    Which Pharmacy is filling the prescription (Not needed for infusion/clinic administered): [facility] [address] [place] [address]  Pharmacy Notified: Yes  Patient Notified: Yes

## 2022-03-06 ENCOUNTER — HEALTH MAINTENANCE LETTER (OUTPATIENT)
Age: 32
End: 2022-03-06

## 2022-05-26 ENCOUNTER — OFFICE VISIT (OUTPATIENT)
Dept: SURGERY | Facility: CLINIC | Age: 32
End: 2022-05-26
Payer: COMMERCIAL

## 2022-05-26 VITALS
SYSTOLIC BLOOD PRESSURE: 100 MMHG | WEIGHT: 178 LBS | BODY MASS INDEX: 30.39 KG/M2 | HEIGHT: 64 IN | DIASTOLIC BLOOD PRESSURE: 78 MMHG

## 2022-05-26 DIAGNOSIS — K91.2 POSTOPERATIVE MALABSORPTION: Primary | ICD-10-CM

## 2022-05-26 DIAGNOSIS — D50.8 OTHER IRON DEFICIENCY ANEMIA: ICD-10-CM

## 2022-05-26 DIAGNOSIS — E66.3 OVERWEIGHT: ICD-10-CM

## 2022-05-26 DIAGNOSIS — R63.2 HYPERPHAGIA: ICD-10-CM

## 2022-05-26 PROCEDURE — 99214 OFFICE O/P EST MOD 30 MIN: CPT | Performed by: FAMILY MEDICINE

## 2022-05-26 RX ORDER — PHENTERMINE HYDROCHLORIDE 37.5 MG/1
TABLET ORAL
COMMUNITY
Start: 2022-04-06 | End: 2022-05-26

## 2022-05-26 RX ORDER — PHENTERMINE HYDROCHLORIDE 37.5 MG/1
TABLET ORAL
Qty: 90 TABLET | Refills: 1 | Status: SHIPPED | OUTPATIENT
Start: 2022-05-26 | End: 2023-01-19

## 2022-05-26 NOTE — LETTER
5/26/2022         RE: Russell Gomez  [address] [place] [address]        Dear Colleague,    Thank you for referring your patient, Russell Gomez, to the [facility] [place]. Please see a copy of my visit note below.    Bariatric Follow Up Visit with a History of Previous Bariatric Surgery     Date of visit: 5/26/2022  Physician: Jinny Pierre MD, MD  Primary Care Provider:  Lali Humphries   32 year old  female    Date of Surgery: 10/27/2020  Initial Weight: 266#  Initial BMI: 45.74  Today's Weight:   Wt Readings from Last 1 Encounters:   05/26/22 80.7 kg (178 lb)     Body mass index is 30.55 kg/m .      Assessment and Plan     Assessment: Russell is a 32 year old year old female who is 19 months s/p  Sleeve with Dr. Rossi Wells DAVID Patricia feels as if she has achieved the goals she hoped to accomplish through bariatric surgery and weight loss.  Her daughter is 7 months old    Encounter Diagnoses   Name Primary?     Postoperative malabsorption Yes     Other iron deficiency anemia      Hyperphagia      Overweight          Current Outpatient Medications:      calcium carbonate (OS-BRYANNA) 500 MG tablet, Take 1 tablet by mouth 2 times daily, Disp: , Rfl:      cyanocobalamin (VITAMIN B-12) 1000 MCG SUBL sublingual tablet, Place 1,000 mcg under the tongue daily, Disp: , Rfl:      ergocalciferol (ERGOCALCIFEROL) 1.25 MG (73216 UT) capsule, Take 50,000 Units by mouth, Disp: , Rfl:      ferrous fumarate 65 mg, [place]. FE,-Vitamin C 125 mg (VITRON C)  MG TABS tablet, Take 1 tablet by mouth daily, Disp: 90 tablet, Rfl: 3     Pediatric Multi Vit-Extra C-FA (FLINTSTONES/EXTRA C) CHEW, Take 1 tablet by mouth, Disp: , Rfl:      phentermine (ADIPEX-P) 37.5 MG tablet, TAKE 1/2 TO 1 (ONE-HALF TO ONE) TABLET BY MOUTH IN THE MORNING BEFORE BREAKFAST, Disp: 90 tablet, Rfl: 1     Vitamin D, Cholecalciferol, 25 MCG (1000 UT) TABS, , Disp: , Rfl:   "    Plan: Continue MVI with iron 2 daily. Add Vitron C daily if tolerated otherwise every other day. Cautioned re: both phentermine and iron can be constipating. She has stool softeners at home if needed. Encouraged support regarding home/social situation with the father of her daughter. She has family support. Consider re-starting family therapy to help navigate the situation. Dietitian prn. Refill phentermine.  Return in about 6 months (around 11/26/2022).    Bariatric Surgery Review     Interim History/LifeChanges: Maintaining a 88# weight loss. Her periods have changed. Her daughter is 7 months old. The father of her daughter had a daughter with another woman 2 months apart from her daughter's birth. She wants to leave the relationship though is stuck with a lease. She is abstinent. She had low hemoglobin after her daughter was born. Most recent HGB 11.1 6 months ago.     Patient Concerns: follow up  Appetite (1-10): good with phentermine  GERD: no    Medication changes: no    Vitamin Intake:   B-12   SL   MVI  2/d [place]   Vitamin D  5,000   Calcium   2/d     Other                LABS: \"Reviewed  hgb 11.1  Nausea no  Vomiting no  Constipation no  Diarrhea no  Rashes no  Hair Loss yes  Calf tenderness no  Breathing difficulty no  Reactive Hypoglycemia no  Light Headedness no   Moods angry d/t infidelity of her SO    12 point ROS as above and otherwise negative      Habits:  Alcohol: no  Tobacco: no  Caffeine no  NSAIDS no  Exercise Routine: walking with baby when weather OK  3 meals/day yes  Protein first yes  60grams/day  Water Separate from meals yes  Calorie Containing Beverages no  Restaurant eating/wk <2  Sleeping 2-3 hour naps and sleeps 5   Stress high  CPAP: NA  Contraception: abstinence   DEXA:not indicated.    Social History     Social History     Socioeconomic History     Marital status: Single     Spouse name: Not on file     Number of children: Not on file     Years of education: Not on file "     Highest education level: Not on file   Occupational History     Not on file   Tobacco Use     Smoking status: Never Smoker     Smokeless tobacco: Never Used   Substance and Sexual Activity     Alcohol use: No     Drug use: No     Sexual activity: Yes     Partners: Male     Birth control/protection: Condom   Other Topics Concern     Not on file   Social History Narrative    Single, one son, Daughter born 10/2021 Working at [employer].  Son's father is in [place] and not involved  Close with her father who lives in [place]  Not close with her mother who lives here   Miscarried twin boys       Social Determinants of Health     Financial Resource Strain: Not on file   Food Insecurity: Not on file   Transportation Needs: Not on file   Physical Activity: Not on file   Stress: Not on file   Social Connections: Not on file   Intimate Partner Violence: Not on file   Housing Stability: Not on file       Past Medical History     Past Medical History:   Diagnosis Date     Acanthosis nigricans      Accessory skin tags      Anemia      Asthma      Bacterial vaginosis 2019    3/21/19     Cervical insufficiency during pregnancy in second trimester, antepartum 2019     Chlamydia      Depression      Dichorionic diamniotic twin pregnancy in second trimester 2019     Diet controlled gestational diabetes mellitus (GDM) in second trimester 2019     Gestational diabetes      Gonococcal infection (acute) of lower genitourinary tract     Created by Conversion      Heartburn      Herpes simplex      Hirsutism      History of  delivery, currently pregnant in first trimester 2019     Low back pain      Menorrhagia      Morbid obesity (H)       labor 2011     Single liveborn infant delivered vaginally 10/13/2021     Status post bilateral salpingectomy 10/13/2021     Problem List     Patient Active Problem List   Diagnosis     Morbid obesity (H)     Anemia     Rh negative state in antepartum  "period, second trimester     History of bariatric surgery     History of gestational diabetes mellitus     Status post bilateral salpingectomy     Single liveborn infant delivered vaginally     Medications       Current Outpatient Medications:      calcium carbonate (OS-BRYANNA) 500 MG tablet, Take 1 tablet by mouth 2 times daily, Disp: , Rfl:      cyanocobalamin (VITAMIN B-12) 1000 MCG SUBL sublingual tablet, Place 1,000 mcg under the tongue daily, Disp: , Rfl:      ergocalciferol (ERGOCALCIFEROL) 1.25 MG (65367 UT) capsule, Take 50,000 Units by mouth, Disp: , Rfl:      ferrous fumarate 65 mg, [place]. FE,-Vitamin C 125 mg (VITRON C)  MG TABS tablet, Take 1 tablet by mouth daily, Disp: 90 tablet, Rfl: 3     Pediatric Multi Vit-Extra C-FA (FLINTSTONES/EXTRA C) CHEW, Take 1 tablet by mouth, Disp: , Rfl:      phentermine (ADIPEX-P) 37.5 MG tablet, TAKE 1/2 TO 1 (ONE-HALF TO ONE) TABLET BY MOUTH IN THE MORNING BEFORE BREAKFAST, Disp: 90 tablet, Rfl: 1     Vitamin D, Cholecalciferol, 25 MCG (1000 UT) TABS, , Disp: , Rfl:    Surgical History     Past Surgical History  She has a past surgical history that includes Tonsillectomy; Pr Lap, Chanel Restrict Proc, Longitudinal Gastrectomy (N/A, 10/27/2020); and Laparotomy mini, tubal ligation (post partum), combined (Bilateral, 10/11/2021).    Objective-Exam     Constitutional:  /78   Ht 1.626 m (5' 4\")   Wt 80.7 kg (178 lb)   BMI 30.55 kg/m      General:  Pleasant and in no acute distress Accompanied by stroller and her 7 month old daughter  Eyes:  EOMI  ENT:  Airway 2+  Moist mucous membranes  Neck:  Supple, No LAD, No thyromegaly, No carotid bruits appreciated  Respiratory: Normal respiratory effort, no cough, wheezes or crackles  CV:  Regular rate and Rhythm,no murmurs, pulses 2+, no calf tenderness, no LE edema  Gastrointestinal: Abdomen NT/ND, BS+  Musculoskeletal: muscle mass WNL  Skin: hair pulled back with headband, incisions nicely healed  Neurological: No " tremor, normal gait  Psychiatric: alert and oriented X3, mood and affect appropriate    Counseling     We reviewed the important post op bariatric recommendations:  -eating 3 meals daily  -eating protein first, getting >60gm protein daily  -eating slowly, chewing food well  -avoiding/limiting calorie containing beverages  -drinking water 15-30 minutes before or after meals  -choosing wheat, not white with breads, crackers, pastas, edwina, bagels, tortillas, rice  -limiting restaurant or cafeteria eating to twice a week or less    We discussed the importance of restorative sleep and stress management in maintaining a healthy weight.  We discussed the National Weight Control Registry healthy weight maintenance strategies and ways to optimize metabolism.  We discussed the importance of physical activity including cardiovascular and strength training in maintaining a healthier weight.    We discussed the importance of life-long vitamin supplementation and life-long  follow-up.    Russell was reminded that, to avoid marginal ulcers she should avoid tobacco at all, alcohol in excess, caffeine in excess, and NSAIDS (unless indicated for cardioprotection or othewise and opposed by a PPI).    Jinny Pierre MD, MD, [facility].  5/26/2022  9:04 AM      No images are attached to the encounter.  Total time spent on the date of this encounter doing: chart review, review of test results, patient visit, physical exam, education, counseling, developing plan of care, and documenting = 30 minutes.      Again, thank you for allowing me to participate in the care of your patient.        Sincerely,        Jinny Pierre MD

## 2022-05-26 NOTE — PROGRESS NOTES
Bariatric Follow Up Visit with a History of Previous Bariatric Surgery     Date of visit: 5/26/2022  Physician: Jinny Pierre MD, MD  Primary Care Provider:  Lali Humphries   32 year old  female    Date of Surgery: 10/27/2020  Initial Weight: 266#  Initial BMI: 45.74  Today's Weight:   Wt Readings from Last 1 Encounters:   05/26/22 80.7 kg (178 lb)     Body mass index is 30.55 kg/m .      Assessment and Plan     Assessment: Russell is a 32 year old year old female who is 19 months s/p  Sleeve with Dr. Rossi Gomez feels as if she has achieved the goals she hoped to accomplish through bariatric surgery and weight loss.  Her daughter is 7 months old    Encounter Diagnoses   Name Primary?     Postoperative malabsorption Yes     Other iron deficiency anemia      Hyperphagia      Overweight          Current Outpatient Medications:      calcium carbonate (OS-BRYANNA) 500 MG tablet, Take 1 tablet by mouth 2 times daily, Disp: , Rfl:      cyanocobalamin (VITAMIN B-12) 1000 MCG SUBL sublingual tablet, Place 1,000 mcg under the tongue daily, Disp: , Rfl:      ergocalciferol (ERGOCALCIFEROL) 1.25 MG (42475 UT) capsule, Take 50,000 Units by mouth, Disp: , Rfl:      ferrous fumarate 65 mg, [place]. FE,-Vitamin C 125 mg (VITRON C)  MG TABS tablet, Take 1 tablet by mouth daily, Disp: 90 tablet, Rfl: 3     Pediatric Multi Vit-Extra C-FA (FLINTSTONES/EXTRA C) CHEW, Take 1 tablet by mouth, Disp: , Rfl:      phentermine (ADIPEX-P) 37.5 MG tablet, TAKE 1/2 TO 1 (ONE-HALF TO ONE) TABLET BY MOUTH IN THE MORNING BEFORE BREAKFAST, Disp: 90 tablet, Rfl: 1     Vitamin D, Cholecalciferol, 25 MCG (1000 UT) TABS, , Disp: , Rfl:      Plan: Continue MVI with iron 2 daily. Add Vitron C daily if tolerated otherwise every other day. Cautioned re: both phentermine and iron can be constipating. She has stool softeners at home if needed. Encouraged support regarding home/social situation with the father of  "her daughter. She has family support. Consider re-starting family therapy to help navigate the situation. Dietitian prn. Refill phentermine.  Return in about 6 months (around 11/26/2022).    Bariatric Surgery Review     Interim History/LifeChanges: Maintaining a 88# weight loss. Her periods have changed. Her daughter is 7 months old. The father of her daughter had a daughter with another woman 2 months apart from her daughter's birth. She wants to leave the relationship though is stuck with a lease. She is abstinent. She had low hemoglobin after her daughter was born. Most recent HGB 11.1 6 months ago.     Patient Concerns: follow up  Appetite (1-10): good with phentermine  GERD: no    Medication changes: no    Vitamin Intake:   B-12   SL   MVI  2/d [place]   Vitamin D  5,000   Calcium   2/d     Other                LABS: \"Reviewed  hgb 11.1  Nausea no  Vomiting no  Constipation no  Diarrhea no  Rashes no  Hair Loss yes  Calf tenderness no  Breathing difficulty no  Reactive Hypoglycemia no  Light Headedness no   Moods angry d/t infidelity of her SO    12 point ROS as above and otherwise negative      Habits:  Alcohol: no  Tobacco: no  Caffeine no  NSAIDS no  Exercise Routine: walking with baby when weather OK  3 meals/day yes  Protein first yes  60grams/day  Water Separate from meals yes  Calorie Containing Beverages no  Restaurant eating/wk <2  Sleeping 2-3 hour naps and sleeps 5   Stress high  CPAP: NA  Contraception: abstinence   DEXA:not indicated.    Social History     Social History     Socioeconomic History     Marital status: Single     Spouse name: Not on file     Number of children: Not on file     Years of education: Not on file     Highest education level: Not on file   Occupational History     Not on file   Tobacco Use     Smoking status: Never Smoker     Smokeless tobacco: Never Used   Substance and Sexual Activity     Alcohol use: No     Drug use: No     Sexual activity: Yes     Partners: Male    "  Birth control/protection: Condom   Other Topics Concern     Not on file   Social History Narrative    Single, one son, Daughter born 10/2021 Working at [employer].  Son's father is in [place] and not involved  Close with her father who lives in [place]  Not close with her mother who lives here   Miscarried twin boys       Social Determinants of Health     Financial Resource Strain: Not on file   Food Insecurity: Not on file   Transportation Needs: Not on file   Physical Activity: Not on file   Stress: Not on file   Social Connections: Not on file   Intimate Partner Violence: Not on file   Housing Stability: Not on file       Past Medical History     Past Medical History:   Diagnosis Date     Acanthosis nigricans      Accessory skin tags      Anemia      Asthma      Bacterial vaginosis 2019    3/21/19     Cervical insufficiency during pregnancy in second trimester, antepartum 2019     Chlamydia      Depression      Dichorionic diamniotic twin pregnancy in second trimester 2019     Diet controlled gestational diabetes mellitus (GDM) in second trimester 2019     Gestational diabetes      Gonococcal infection (acute) of lower genitourinary tract     Created by Conversion      Heartburn      Herpes simplex      Hirsutism      History of  delivery, currently pregnant in first trimester 2019     Low back pain      Menorrhagia      Morbid obesity (H)       labor 2011     Single liveborn infant delivered vaginally 10/13/2021     Status post bilateral salpingectomy 10/13/2021     Problem List     Patient Active Problem List   Diagnosis     Morbid obesity (H)     Anemia     Rh negative state in antepartum period, second trimester     History of bariatric surgery     History of gestational diabetes mellitus     Status post bilateral salpingectomy     Single liveborn infant delivered vaginally     Medications       Current Outpatient Medications:      calcium carbonate (OS-BRYANNA) 500  "MG tablet, Take 1 tablet by mouth 2 times daily, Disp: , Rfl:      cyanocobalamin (VITAMIN B-12) 1000 MCG SUBL sublingual tablet, Place 1,000 mcg under the tongue daily, Disp: , Rfl:      ergocalciferol (ERGOCALCIFEROL) 1.25 MG (55382 UT) capsule, Take 50,000 Units by mouth, Disp: , Rfl:      ferrous fumarate 65 mg, [place]. FE,-Vitamin C 125 mg (VITRON C)  MG TABS tablet, Take 1 tablet by mouth daily, Disp: 90 tablet, Rfl: 3     Pediatric Multi Vit-Extra C-FA (FLINTSTONES/EXTRA C) CHEW, Take 1 tablet by mouth, Disp: , Rfl:      phentermine (ADIPEX-P) 37.5 MG tablet, TAKE 1/2 TO 1 (ONE-HALF TO ONE) TABLET BY MOUTH IN THE MORNING BEFORE BREAKFAST, Disp: 90 tablet, Rfl: 1     Vitamin D, Cholecalciferol, 25 MCG (1000 UT) TABS, , Disp: , Rfl:    Surgical History     Past Surgical History  She has a past surgical history that includes Tonsillectomy; Pr Lap, Chanel Restrict Proc, Longitudinal Gastrectomy (N/A, 10/27/2020); and Laparotomy mini, tubal ligation (post partum), combined (Bilateral, 10/11/2021).    Objective-Exam     Constitutional:  /78   Ht 1.626 m (5' 4\")   Wt 80.7 kg (178 lb)   BMI 30.55 kg/m      General:  Pleasant and in no acute distress Accompanied by stroller and her 7 month old daughter  Eyes:  EOMI  ENT:  Airway 2+  Moist mucous membranes  Neck:  Supple, No LAD, No thyromegaly, No carotid bruits appreciated  Respiratory: Normal respiratory effort, no cough, wheezes or crackles  CV:  Regular rate and Rhythm,no murmurs, pulses 2+, no calf tenderness, no LE edema  Gastrointestinal: Abdomen NT/ND, BS+  Musculoskeletal: muscle mass WNL  Skin: hair pulled back with headband, incisions nicely healed  Neurological: No tremor, normal gait  Psychiatric: alert and oriented X3, mood and affect appropriate    Counseling     We reviewed the important post op bariatric recommendations:  -eating 3 meals daily  -eating protein first, getting >60gm protein daily  -eating slowly, chewing food " well  -avoiding/limiting calorie containing beverages  -drinking water 15-30 minutes before or after meals  -choosing wheat, not white with breads, crackers, pastas, edwina, bagels, tortillas, rice  -limiting restaurant or cafeteria eating to twice a week or less    We discussed the importance of restorative sleep and stress management in maintaining a healthy weight.  We discussed the National Weight Control Registry healthy weight maintenance strategies and ways to optimize metabolism.  We discussed the importance of physical activity including cardiovascular and strength training in maintaining a healthier weight.    We discussed the importance of life-long vitamin supplementation and life-long  follow-up.    Russell was reminded that, to avoid marginal ulcers she should avoid tobacco at all, alcohol in excess, caffeine in excess, and NSAIDS (unless indicated for cardioprotection or othewise and opposed by a PPI).    Jinny Pierre MD, MD, [facility].  5/26/2022  9:04 AM      No images are attached to the encounter.  Total time spent on the date of this encounter doing: chart review, review of test results, patient visit, physical exam, education, counseling, developing plan of care, and documenting = 30 minutes.

## 2022-05-26 NOTE — PATIENT INSTRUCTIONS
[facility] Bariatric Care  Nutritional Guidelines  Gastric Sleeve 18 Months Post Op and Beyond    General Guidelines and Helpful Hints:  Eat 3 meals per day + protein supplement(s). No snacks between meals.  Do not skip meals.  This can cause overeating at the next meal and will prevent adequate protein and nutritional intake.  Aim for 60-80 grams of protein per day.  Always eat your protein first. This assists with optimal nutrition and helps you stay full longer.  Depending on your portion size, you may need to drink approved protein supplement between meals to achieve protein goals. Follow recommendations of your Dietitian.   Eat your protein first, and then follow with fiber.   It is not necessary to count your fiber, but 15-20 grams per day is recommended.    Add fiber by including fruits, vegetables, whole grains, and beans.   Portions should remain about 1 cup per meal. Use measuring cups to be accurate.  Continue to use saucer/salad plates, infant/toddler silverware to keep portion sizes small and take small bites.  Eat S-L-O-W-L-Y to make each meal last 20-30 minutes. Always stop eating when satisfied.  Continue to use caution with foods containing skins, peels or membranes. Chew well!  Aim for 64 oz. of calorie-free fluids daily.  Continue to avoid caffeine and carbonation. If you choose to drink alcohol, do so in moderation.   Remember to avoid drinking during meals, 15-30 minutes before and 30 minutes after.  Exercise is [place] for continued weight loss and weight maintenance. Aim for 30-60 minutes of physical activity most days of the week. Include cardiovascular and strength training.  If having trouble tolerating meat, try using a crock-pot, tinfoil tent, steamer or other moist cooking method to create tender meats. Add broth or low-fat gravy to help meat stay moist.   Avoid high sugar and high fat foods to prevent high calorie intakes and a reduced rate of weight loss, or weight regain.  Check nutrition  labels for less than 10 grams of sugar and less than 10 grams of fat per serving.  Continue Taking Vitamins/Minerals:  1397-1914 mcg of Sublingual B-12 daily  1 Multivitamin with Iron twice daily (chewable or swallow tabs)  500-600 mg Calcium Citrate twice daily (chewable or swallow tabs)  5000 IU Vitamin D3 daily    Sample Grocery List    Protein:  Fat free Greek or light yogurt (less than 10 grams sugar)  Fat free or low-fat cottage cheese  String cheese or reduced fat cheese slices  Tuna, salmon, crab, egg, or chicken salad made with light or fat free mayonnaise  Egg or Egg Substitute  Lean/extra lean turkey, beef, bison, venison (ground, sirloin, round, flank)  Pork loin or tenderloin (grilled, baked, broiled)  Fish such as salmon, tuna, trout, tilapia, etc. (grilled, baked, broiled)  Tender cuts of lean (skinless) turkey or chicken  Lean deli meats: turkey, lean ham, chicken, lean roast beef  Beans such as kidney, garbanzo, black, rachel, or low-fat/fat free refried beans  Peanut butter (natural preferred). Limit to 1 Tbsp. per day.  Low-fat meatloaf (made with lean ground beef or turkey)  Sloppy Joes made with low-sugar ketchup and lean ground beef or turkey  Soy or vegetable protein (i.e. vegan crumbles, soy/veggie burger, tofu)  Hummus    Vegetables:  Fresh: cooked or raw (as tolerated)  Frozen vegetables  Canned vegetables (low sodium or no salt added, rinse before cooking/eating)  (Ok to have skins/peels/membranes/seeds - just chew well)    Fruits:  Fresh fruit  Frozen fruit (no sugar added)  Canned fruit (packed in its own juice, NOT syrup)  (Ok to have skins/peels/membranes/seeds - just chew well)    Starch:  Unsweetened whole-grain hot cereal (or high fiber cold cereal, dry)  Toasted whole wheat bread or [place] Thins  Whole grain crackers  Baked /boiled/mashed potato/sweet potato  Cooked whole grain pasta, brown rice, or other cooked whole grains  Starchy vegetables: corn, peas, winter  squash    Protein Supplement:   Ready to drink protein shake with:  15-30 grams protein per serving  Less than 10 grams total carbohydrate per serving   Protein powder mixed with:   Skim or 1% milk  Low fat or fat free Lactaid milk, plain or no sugar added soymilk  Water     Fats: (use in moderation)  1 teaspoon of soft tub margarine  1 teaspoon olive oil, canola oil, or peanut oil  1 tablespoon of low-fat ghosh or salad dressing     Sample Menu for 18+ months after Gastric Sleeve    You do NOT need to eat/drink the full portion sizes listed below  Always stop when you are satisfied    Breakfast   cup 1% cottage cheese     cup mixed berries   Lunch 2 oz lean roast beef on   [place] Thin with 1 tsp. light ghosh    small tomato, chopped, mixed with 1 tsp. light vinaigrette dressing   Supplement Approved protein supplement (if needed between meals)   Dinner 2 oz grilled salmon    cup salad greens with 1 tsp. light salad dressing and 1 tsp. ground flax seed    cup quinoa or brown rice     Breakfast   cup egg substitute with   cup sautéed chopped vegetables  2 light [place] Krisp crackers   Lunch Tuna Melt:   cup tuna mixed with 1 tsp. light ghosh over   Bryan Thin. Top with 2-3 slices cucumber and 1 oz slice of low fat cheese   Supplement 1 cup skim milk (if needed between meals)   Dinner 3 oz  grilled, broiled, or baked seasoned skinless chicken breast    cup asparagus     Breakfast   cup plain oatmeal made with skim or 1% milk with 1 Tbsp. flavored/unflavored protein powder added  1 mozzarella string cheese   Lunch 2 oz deli turkey breast  1/3 cup salad with 1 tsp. light salad dressing, 1/8 of a whole avocado and 1 Tbsp. sunflower seeds   Dinner 3 oz. pork loin made in a crock pot, seasoned with a spice rub    cup cooked carrots   Supplement Approved protein supplement (if needed between meals)     Breakfast 1 cup breakfast casserole made with egg substitute, turkey sausage,  and steamed, chopped bell peppers    Supplement  1 cup light Greek yogurt (if needed between meals)   Lunch 2 oz. teriyaki turkey    cup mashed sweet potato with 1-2 spritzes of spray butter (like [place])    cup fresh pineapple   Dinner 3 oz low fat meatloaf    cup roasted garlic zucchini     Breakfast   cup leftover breakfast casserole    cup no sugar added applesauce with 1 Tbsp. unflavored protein powder and a sprinkle of cinnamon    Lunch 3 oz shrimp with 1-2 Tbsp. low-sugar cocktail sauce for dipping    c. whole wheat pasta drizzled with   tsp. olive oil   Supplement 1 cup skim/1% milk with scoop of protein powder (if needed between meals)   Dinner Grilled, seasoned kebob with 2 oz lean beef and   cup vegetables     Breakfast Breakfast pizza:   [place] Thin spread with 1 Tbsp. low sugar spaghetti sauce,   cup shredded low fat cheese, melted and 1 slice of [place] keen     cup fresh fruit mixed with chopped almonds   Lunch   cup black bean soup  4-5 whole grain crackers   Dinner 3 oz  tilapia with lemon pepper seasoning    cup stewed tomatoes   Supplement 1 string cheese (if needed between meals)     Breakfast 2 hard boiled eggs (discard 1 egg yolk)    whole wheat English Muffin with 1 tsp. low sugar jelly   Lunch   cup leftover black bean soup topped with 1-2 Tbsp. low fat cheese  2-3 light [place] Krisp crackers   Supplement Approved protein supplement (if needed between meals)   Dinner 3 oz sirloin steak    cup steamed broccoli

## 2022-08-11 ENCOUNTER — OFFICE VISIT (OUTPATIENT)
Dept: SURGERY | Facility: CLINIC | Age: 32
End: 2022-08-11
Payer: COMMERCIAL

## 2022-08-11 VITALS
HEIGHT: 64 IN | BODY MASS INDEX: 30.93 KG/M2 | WEIGHT: 181.2 LBS | DIASTOLIC BLOOD PRESSURE: 64 MMHG | SYSTOLIC BLOOD PRESSURE: 112 MMHG

## 2022-08-11 DIAGNOSIS — K91.2 POSTOPERATIVE MALABSORPTION: Primary | ICD-10-CM

## 2022-08-11 PROCEDURE — 99214 OFFICE O/P EST MOD 30 MIN: CPT | Performed by: FAMILY MEDICINE

## 2022-08-11 NOTE — LETTER
8/11/2022         RE: Russell Gomez  [address] [place] [address]        Dear Colleague,    Thank you for referring your patient, Russell Gomez, to the [facility] [place]. Please see a copy of my visit note below.    Bariatric Follow Up Visit with a History of Previous Bariatric Surgery     Date of visit: 8/11/2022  Physician: Jinny Pierre MD, MD  Primary Care Provider:  Lali Humphries   32 year old  female    Date of Surgery: 10/27/2020  Initial Weight: 266#  Initial BMI: 45.74  Today's Weight:   Wt Readings from Last 1 Encounters:   08/11/22 82.2 kg (181 lb 3.2 oz)     Body mass index is 31.1 kg/m .      Assessment and Plan     Assessment: Russell is a 32 year old year old female who is almost 2 yrs s/p  Sleeve Gastrectomy with Dr. Rossi Gomez feels as if she has achieved the goals she hoped to accomplish through bariatric surgery and weight loss.  Her daughter is now 10 months old.     Encounter Diagnosis   Name Primary?     Postoperative malabsorption Yes         Current Outpatient Medications:      calcium carbonate (OS-BRYANNA) 500 MG tablet, Take 1 tablet by mouth 2 times daily, Disp: , Rfl:      cyanocobalamin (VITAMIN B-12) 1000 MCG SUBL sublingual tablet, Place 1,000 mcg under the tongue daily, Disp: , Rfl:      ergocalciferol (ERGOCALCIFEROL) 1.25 MG (67070 UT) capsule, Take 50,000 Units by mouth, Disp: , Rfl:      ferrous fumarate 65 mg, Circle. FE,-Vitamin C 125 mg (VITRON C)  MG TABS tablet, Take 1 tablet by mouth daily, Disp: 90 tablet, Rfl: 3     Pediatric Multi Vit-Extra C-FA (FLINTSTONES/EXTRA C) CHEW, Take 1 tablet by mouth, Disp: , Rfl:      phentermine (ADIPEX-P) 37.5 MG tablet, TAKE 1/2 TO 1 (ONE-HALF TO ONE) TABLET BY MOUTH IN THE MORNING BEFORE BREAKFAST, Disp: 90 tablet, Rfl: 1     Vitamin D, Cholecalciferol, 25 MCG (1000 UT) TABS, , Disp: , Rfl:      Plan: Continue phentermine,  "dietitian, seeking support when needed. Continue vitamins with consistency. Labs 11/2022    Return in about 6 months (around 2/11/2023).Elva in 1 month then [id number] follow ups and prn while taking phentermine.    Bariatric Surgery Review     Interim History/LifeChanges: Busy at work- working 14 hour shifts. Her daughter is 10 months old. 3# down since November visit. Labs looked excellent. Maintaining a 85# weight loss. Feels physically well. Multiple life stressors. Taking vitamins with consistency.     Patient Concerns: not eating well  Appetite (1-10): OK  GERD: no    Medication changes: none    Vitamin Intake:   B-12   SL   MVI  daily   Vitamin D  5,000   Calcium   carbonate     Other  Vitron C              LABS: \"Reviewed from 11/2021 excellent. Hgb moving in the right direction after delivery of her daughter.  2 yr post op labs due 11/1022    Nausea occ  Vomiting occ  Constipation no  Diarrhea no  Rashes no  Hair Loss no  Calf tenderness no  Breathing difficulty no  Reactive Hypoglycemia no  Light Headedness no   Moods stressed    12 point ROS as above and otherwise negative      Habits:  Alcohol: none  Tobacco: never  Caffeine   NSAIDS no  Exercise Routine: getting a lot of steps at work. Up to 14 hour shifts  3 meals/day Protein shake and banana, coffee, Protein shake, salad, veggies  Protein first yes  60+grams/day  Water Separate from meals yes  Calorie Containing Beverages no  Restaurant eating/wk <2  Sleeping OK given daughter is 10mo old and doesn't nap much  Stress moderate to high  CPAP: no  Contraception: TL  DEXA:not indicated for age <45    Social History     Social History     Socioeconomic History     Marital status: Single     Spouse name: Not on file     Number of children: Not on file     Years of education: Not on file     Highest education level: Not on file   Occupational History     Not on file   Tobacco Use     Smoking status: Never Smoker     Smokeless tobacco: Never Used   Substance " and Sexual Activity     Alcohol use: No     Drug use: No     Sexual activity: Yes     Partners: Male     Birth control/protection: Condom   Other Topics Concern     Not on file   Social History Narrative    Single, one son, Daughter born 10/2021 Working [employer]. Enjoys her job.   Son's father is in [place] and not involved  Close with her father who lives in [place]  Not close with her mother who lives here   Miscarried twin boys. Looks after her 19 yo niece who has learning challenges and watches her kids for her while she is at work.      Social Determinants of Health     Financial Resource Strain: Not on file   Food Insecurity: Not on file   Transportation Needs: Not on file   Physical Activity: Not on file   Stress: Not on file   Social Connections: Not on file   Intimate Partner Violence: Not on file   Housing Stability: Not on file       Past Medical History     Past Medical History:   Diagnosis Date     Acanthosis nigricans      Accessory skin tags      Anemia      Asthma      Bacterial vaginosis 2019    3/21/19     Cervical insufficiency during pregnancy in second trimester, antepartum 2019     Chlamydia      Depression      Dichorionic diamniotic twin pregnancy in second trimester 2019     Diet controlled gestational diabetes mellitus (GDM) in second trimester 2019     Gestational diabetes      Gonococcal infection (acute) of lower genitourinary tract     Created by Conversion      Heartburn      Herpes simplex      Hirsutism      History of  delivery, currently pregnant in first trimester 2019     Low back pain      Menorrhagia      Morbid obesity (H)       labor 2011     Single liveborn infant delivered vaginally 10/13/2021     Status post bilateral salpingectomy 10/13/2021     Problem List     Patient Active Problem List   Diagnosis     Morbid obesity (H)     Anemia     Rh negative state in antepartum period, second trimester     History of  "bariatric surgery     History of gestational diabetes mellitus     Status post bilateral salpingectomy     Single liveborn infant delivered vaginally     Medications       Current Outpatient Medications:      calcium carbonate (OS-BRYANNA) 500 MG tablet, Take 1 tablet by mouth 2 times daily, Disp: , Rfl:      cyanocobalamin (VITAMIN B-12) 1000 MCG SUBL sublingual tablet, Place 1,000 mcg under the tongue daily, Disp: , Rfl:      ergocalciferol (ERGOCALCIFEROL) 1.25 MG (11959 UT) capsule, Take 50,000 Units by mouth, Disp: , Rfl:      ferrous fumarate 65 mg, [place]. FE,-Vitamin C 125 mg (VITRON C)  MG TABS tablet, Take 1 tablet by mouth daily, Disp: 90 tablet, Rfl: 3     Pediatric Multi Vit-Extra C-FA (FLINTSTONES/EXTRA C) CHEW, Take 1 tablet by mouth, Disp: , Rfl:      phentermine (ADIPEX-P) 37.5 MG tablet, TAKE 1/2 TO 1 (ONE-HALF TO ONE) TABLET BY MOUTH IN THE MORNING BEFORE BREAKFAST, Disp: 90 tablet, Rfl: 1     Vitamin D, Cholecalciferol, 25 MCG (1000 UT) TABS, , Disp: , Rfl:    Surgical History     Past Surgical History  She has a past surgical history that includes Tonsillectomy; Pr Lap, Chanel Restrict Proc, Longitudinal Gastrectomy (N/A, 10/27/2020); and Laparotomy mini, tubal ligation (post partum), combined (Bilateral, 10/11/2021).    Objective-Exam     Constitutional:  /64 (BP Location: Right arm, Patient Position: Sitting, Cuff Size: Adult Large)   Ht 1.626 m (5' 4\")   Wt 82.2 kg (181 lb 3.2 oz)   BMI 31.10 kg/m      General:  Pleasant and in no acute distress   Eyes:  EOMI  ENT:  Airway 2+  Moist mucous membranes  Neck:  Supple, No LAD, No thyromegaly, No carotid bruits appreciated  Respiratory: Normal respiratory effort, no cough, wheezes or crackles  CV:  Regular rate and Rhythm,nomurmurs, pulses 2+, no calf tenderness, no LE edema  Gastrointestinal: Abdomen NT/ND, BS+  Musculoskeletal: muscle mass WNL  Skin: color baseline hair pulled back, incisions nicely healed  Neurological: No tremor, normal " gait  Psychiatric: alert and oriented X3, mood and affect normal    Counseling     We reviewed the important post op bariatric recommendations:  -eating 3 meals daily  -eating protein first, getting >60gm protein daily  -eating slowly, chewing food well  -avoiding/limiting calorie containing beverages  -drinking water 15-30 minutes before or after meals  -choosing wheat, not white with breads, crackers, pastas, edwina, bagels, tortillas, rice  -limiting restaurant or cafeteria eating to twice a week or less    We discussed the importance of restorative sleep and stress management in maintaining a healthy weight.  We discussed the National Weight Control Registry healthy weight maintenance strategies and ways to optimize metabolism.  We discussed the importance of physical activity including cardiovascular and strength training in maintaining a healthier weight.    We discussed the importance of life-long vitamin supplementation and life-long  follow-up.    Russell was reminded that, to avoid marginal ulcers she should avoid tobacco at all, alcohol in excess, caffeine in excess, and NSAIDS (unless indicated for cardioprotection or othewise and opposed by a PPI).    Jinny Pierre MD, MD, [facility].  8/11/2022  10:48 AM      No images are attached to the encounter.  Total time spent on the date of this encounter doing: chart review, review of test results, patient visit, physical exam, education, counseling, developing plan of care, and documenting = 30 minutes.      Again, thank you for allowing me to participate in the care of your patient.        Sincerely,        Jinny Pierre MD

## 2022-08-11 NOTE — PROGRESS NOTES
Bariatric Follow Up Visit with a History of Previous Bariatric Surgery     Date of visit: 8/11/2022  Physician: Jinny Pierre MD, MD  Primary Care Provider:  Lali Humphries   32 year old  female    Date of Surgery: 10/27/2020  Initial Weight: 266#  Initial BMI: 45.74  Today's Weight:   Wt Readings from Last 1 Encounters:   08/11/22 82.2 kg (181 lb 3.2 oz)     Body mass index is 31.1 kg/m .      Assessment and Plan     Assessment: Russell is a 32 year old year old female who is almost 2 yrs s/p  Sleeve Gastrectomy with Dr. Rossi Gomez feels as if she has achieved the goals she hoped to accomplish through bariatric surgery and weight loss.  Her daughter is now 10 months old.     Encounter Diagnosis   Name Primary?     Postoperative malabsorption Yes         Current Outpatient Medications:      calcium carbonate (OS-BRYANNA) 500 MG tablet, Take 1 tablet by mouth 2 times daily, Disp: , Rfl:      cyanocobalamin (VITAMIN B-12) 1000 MCG SUBL sublingual tablet, Place 1,000 mcg under the tongue daily, Disp: , Rfl:      ergocalciferol (ERGOCALCIFEROL) 1.25 MG (90265 UT) capsule, Take 50,000 Units by mouth, Disp: , Rfl:      ferrous fumarate 65 mg, [place]. FE,-Vitamin C 125 mg (VITRON C)  MG TABS tablet, Take 1 tablet by mouth daily, Disp: 90 tablet, Rfl: 3     Pediatric Multi Vit-Extra C-FA (FLINTSTONES/EXTRA C) CHEW, Take 1 tablet by mouth, Disp: , Rfl:      phentermine (ADIPEX-P) 37.5 MG tablet, TAKE 1/2 TO 1 (ONE-HALF TO ONE) TABLET BY MOUTH IN THE MORNING BEFORE BREAKFAST, Disp: 90 tablet, Rfl: 1     Vitamin D, Cholecalciferol, 25 MCG (1000 UT) TABS, , Disp: , Rfl:      Plan: Continue phentermine, dietitian, seeking support when needed. Continue vitamins with consistency. Labs 11/2022    Return in about 6 months (around 2/11/2023).Elva in 1 month then [id number] follow ups and prn while taking phentermine.    Bariatric Surgery Review     Interim History/LifeChanges: Busy at  "work- working 14 hour shifts. Her daughter is 10 months old. 3# down since November visit. Labs looked excellent. Maintaining a 85# weight loss. Feels physically well. Multiple life stressors. Taking vitamins with consistency.     Patient Concerns: not eating well  Appetite (1-10): OK  GERD: no    Medication changes: none    Vitamin Intake:   B-12   SL   MVI  daily   Vitamin D  5,000   Calcium   carbonate     Other  Vitron C              LABS: \"Reviewed from 11/2021 excellent. Hgb moving in the right direction after delivery of her daughter.  2 yr post op labs due 11/1022    Nausea occ  Vomiting occ  Constipation no  Diarrhea no  Rashes no  Hair Loss no  Calf tenderness no  Breathing difficulty no  Reactive Hypoglycemia no  Light Headedness no   Moods stressed    12 point ROS as above and otherwise negative      Habits:  Alcohol: none  Tobacco: never  Caffeine   NSAIDS no  Exercise Routine: getting a lot of steps at work. Up to 14 hour shifts  3 meals/day Protein shake and banana, coffee, Protein shake, salad, veggies  Protein first yes  60+grams/day  Water Separate from meals yes  Calorie Containing Beverages no  Restaurant eating/wk <2  Sleeping OK given daughter is 10mo old and doesn't nap much  Stress moderate to high  CPAP: no  Contraception: TL  DEXA:not indicated for age <45    Social History     Social History     Socioeconomic History     Marital status: Single     Spouse name: Not on file     Number of children: Not on file     Years of education: Not on file     Highest education level: Not on file   Occupational History     Not on file   Tobacco Use     Smoking status: Never Smoker     Smokeless tobacco: Never Used   Substance and Sexual Activity     Alcohol use: No     Drug use: No     Sexual activity: Yes     Partners: Male     Birth control/protection: Condom   Other Topics Concern     Not on file   Social History Narrative    Single, one son, Daughter born 10/2021 Working [employer]. Enjoys " her job.   Son's father is in [place] and not involved  Close with her father who lives in [place]  Not close with her mother who lives here   Miscarried twin boys. Looks after her 17 yo niece who has learning challenges and watches her kids for her while she is at work.      Social Determinants of Health     Financial Resource Strain: Not on file   Food Insecurity: Not on file   Transportation Needs: Not on file   Physical Activity: Not on file   Stress: Not on file   Social Connections: Not on file   Intimate Partner Violence: Not on file   Housing Stability: Not on file       Past Medical History     Past Medical History:   Diagnosis Date     Acanthosis nigricans      Accessory skin tags      Anemia      Asthma      Bacterial vaginosis 2019    3/21/19     Cervical insufficiency during pregnancy in second trimester, antepartum 2019     Chlamydia      Depression      Dichorionic diamniotic twin pregnancy in second trimester 2019     Diet controlled gestational diabetes mellitus (GDM) in second trimester 2019     Gestational diabetes      Gonococcal infection (acute) of lower genitourinary tract     Created by Conversion      Heartburn      Herpes simplex      Hirsutism      History of  delivery, currently pregnant in first trimester 2019     Low back pain      Menorrhagia      Morbid obesity (H)       labor 2011     Single liveborn infant delivered vaginally 10/13/2021     Status post bilateral salpingectomy 10/13/2021     Problem List     Patient Active Problem List   Diagnosis     Morbid obesity (H)     Anemia     Rh negative state in antepartum period, second trimester     History of bariatric surgery     History of gestational diabetes mellitus     Status post bilateral salpingectomy     Single liveborn infant delivered vaginally     Medications       Current Outpatient Medications:      calcium carbonate (OS-BRYANNA) 500 MG tablet, Take 1 tablet by mouth 2 times  "daily, Disp: , Rfl:      cyanocobalamin (VITAMIN B-12) 1000 MCG SUBL sublingual tablet, Place 1,000 mcg under the tongue daily, Disp: , Rfl:      ergocalciferol (ERGOCALCIFEROL) 1.25 MG (36388 UT) capsule, Take 50,000 Units by mouth, Disp: , Rfl:      ferrous fumarate 65 mg, [place]. FE,-Vitamin C 125 mg (VITRON C)  MG TABS tablet, Take 1 tablet by mouth daily, Disp: 90 tablet, Rfl: 3     Pediatric Multi Vit-Extra C-FA (FLINTSTONES/EXTRA C) CHEW, Take 1 tablet by mouth, Disp: , Rfl:      phentermine (ADIPEX-P) 37.5 MG tablet, TAKE 1/2 TO 1 (ONE-HALF TO ONE) TABLET BY MOUTH IN THE MORNING BEFORE BREAKFAST, Disp: 90 tablet, Rfl: 1     Vitamin D, Cholecalciferol, 25 MCG (1000 UT) TABS, , Disp: , Rfl:    Surgical History     Past Surgical History  She has a past surgical history that includes Tonsillectomy; Pr Lap, Chanel Restrict Proc, Longitudinal Gastrectomy (N/A, 10/27/2020); and Laparotomy mini, tubal ligation (post partum), combined (Bilateral, 10/11/2021).    Objective-Exam     Constitutional:  /64 (BP Location: Right arm, Patient Position: Sitting, Cuff Size: Adult Large)   Ht 1.626 m (5' 4\")   Wt 82.2 kg (181 lb 3.2 oz)   BMI 31.10 kg/m      General:  Pleasant and in no acute distress   Eyes:  EOMI  ENT:  Airway 2+  Moist mucous membranes  Neck:  Supple, No LAD, No thyromegaly, No carotid bruits appreciated  Respiratory: Normal respiratory effort, no cough, wheezes or crackles  CV:  Regular rate and Rhythm,nomurmurs, pulses 2+, no calf tenderness, no LE edema  Gastrointestinal: Abdomen NT/ND, BS+  Musculoskeletal: muscle mass WNL  Skin: color baseline hair pulled back, incisions nicely healed  Neurological: No tremor, normal gait  Psychiatric: alert and oriented X3, mood and affect normal    Counseling     We reviewed the important post op bariatric recommendations:  -eating 3 meals daily  -eating protein first, getting >60gm protein daily  -eating slowly, chewing food well  -avoiding/limiting calorie " containing beverages  -drinking water 15-30 minutes before or after meals  -choosing wheat, not white with breads, crackers, pastas, edwina, bagels, tortillas, rice  -limiting restaurant or cafeteria eating to twice a week or less    We discussed the importance of restorative sleep and stress management in maintaining a healthy weight.  We discussed the National Weight Control Registry healthy weight maintenance strategies and ways to optimize metabolism.  We discussed the importance of physical activity including cardiovascular and strength training in maintaining a healthier weight.    We discussed the importance of life-long vitamin supplementation and life-long  follow-up.    Russell was reminded that, to avoid marginal ulcers she should avoid tobacco at all, alcohol in excess, caffeine in excess, and NSAIDS (unless indicated for cardioprotection or othewise and opposed by a PPI).    Jinny Pierre MD, MD, [facility].  8/11/2022  10:48 AM      No images are attached to the encounter.  Total time spent on the date of this encounter doing: chart review, review of test results, patient visit, physical exam, education, counseling, developing plan of care, and documenting = 30 minutes.

## 2022-09-01 ENCOUNTER — VIRTUAL VISIT (OUTPATIENT)
Dept: SURGERY | Facility: CLINIC | Age: 32
End: 2022-09-01
Payer: COMMERCIAL

## 2022-09-01 VITALS — HEIGHT: 64 IN | WEIGHT: 180 LBS | BODY MASS INDEX: 30.73 KG/M2

## 2022-09-01 DIAGNOSIS — Z98.84 BARIATRIC SURGERY STATUS: Primary | ICD-10-CM

## 2022-09-01 DIAGNOSIS — R63.2 HYPERPHAGIA: ICD-10-CM

## 2022-09-01 DIAGNOSIS — E66.811 OBESITY, CLASS I, BMI 30.0-34.9 (SEE ACTUAL BMI): ICD-10-CM

## 2022-09-01 PROCEDURE — 97803 MED NUTRITION INDIV SUBSEQ: CPT | Mod: 95 | Performed by: DIETITIAN, REGISTERED

## 2022-09-01 NOTE — LETTER
2022         RE: Russell Gomez  [address] [place] [address]        Dear Colleague,    Thank you for referring your patient, Russell Gomez, to the [facility] [place]. Please see a copy of my visit note below.    Russell Gomez is a 32 year old who is being evaluated via a billable video visit.      How would you like to obtain your AVS? MyChart  If the video visit is dropped, the invitation should be resent by: Send to e-mail at: [email or web address]  Will anyone else be joining your video visit? No    Video Start Time: 9:58 AM      Post-op Surgical Weight Loss Diet Evaluation     Assessment:  Pt presents for 2 years post-op RD visit, s/p LSG on 10-27-20 with Dr. Richey. Today we reviewed current eating habits and level of physical activity, and instructed on the changes that are required for successful bariatric outcomes.    Patient Progress: baby is nearly 1 year old, working to get protein in  -taking phentermine- feels like this is giving her a boost  -states she is low energy with working a lot, taking care of the home- feels this leads to snacking or choosing foods she is not supposed to  -goal weight is 160lb    Initial weight: 266lb  Current weight: 180lb  Weight change: 86lb down    Body mass index is 30.9 kg/m .    Patient Active Problem List   Diagnosis     Morbid obesity (H)     Anemia     Rh negative state in antepartum period, second trimester     History of bariatric surgery     History of gestational diabetes mellitus     Status post bilateral salpingectomy     Single liveborn infant delivered vaginally       Vitamins   Multi Vit with Iron: had stopped taking pre-bebo but has started this again a couple weeks ago  Calcium Citrate: yes  B12: yes  D3: yes    Do you experience hunger? yes  Do you experience any reflux or discomfort with eating? no  Nausea: no  Vomiting: yes, if eating too quickly  Diarrhea: no  Constipation: no  Hair  "loss:no    Diet Recall/Time:   Breakfast: couple eggs and banana OR protein shake  Lunch: skipping this a lot- might have something around 1pm, drive through- 6 piece chicken nugget, burger without the bun, etc.     Foods not tolerated: apples    Meal Duration:20 min    Fluid-meal separation:  Fluids are  30min before and 30 minutes after meals.    Fluid Intake  Water: drinking a lot of water  Caffeine: 1 cup coffee daily    Exercise: not moving as much as she used to- tired and busy  -bought a waist  and wears at work- advised against this  -weighted hula hoop but has not used this    PES statement:      (NC-1.4) Altered GI Function related to Alteration in gastrointestinal tract structure and/or function/ Decreased functional length of the GI tract as evidenced by Weight loss of 32.33% initial body weight; sleeve gastrectomy  (NB-1.7) Undesirable food choices related to Failure to adjust for lifestyle changes as evidenced by low protein intake at meals; large portions; skipping meals; frequent grazing;  mindless eating ; drinking with meals, not chewing each bite to applesauce consistency; consuming caffeine/carbonation daily, frequent restaurant intake; and no structured activity regimen  (NI-5.7.1) Inadequate protein intake related to Gastric bypass causing increased nutrient needs due to malabsorption/ Decreased ability to consume sufficient protein as evidenced by Edema, poor musculature, dull skin, thin and fragile hair; and Estimated intake of protein insufficient to meet requirements    Intervention    Discussion    1. Recommended to consume 15-20gm protein at 3 meals daily, along with protein supplement/\"planned protein containing snack\" of 15-30gm protein, to reach goal of 60-80 gm protein daily.  2. Reviewed lean protein sources  3. Bariatric Plate Method-  including lean/low fat protein at each meal, including a vegetable/fruit, and limiting carbohydrate intake to less than 25% of plate " "volume.     Instructions  1. Include 15-20gm protein at each meal, along with protein supplement/\"planned protein containing snack\" of 15-30gm protein, to reach goal of 60-80 gm protein daily.  2. Increase fluid intake to 64oz daily: choose plain or calorie/carbonation-free beverages.  3. Incorporate daily structured activity, 30-60 minutes most days of the week  4. Recommended pt to start taking: Multi Vit + iron 2x/day, calcium citrate 400-600 mg 2x/day, 1321-7369 mcg of Sublingual B-12 daily, and 5000 IU Vitamin D3 daily. (MVI and calcium can be taken at the same time)  5. Read food labels more consistently: keeping total fat grams <10, total sugar grams <10, fiber >3gm per serving.  6. Increase vegetable/fruit intake, by having a vegetable or fruit with each meal daily.  7. Practice plate method: 1/2 plate lean/low fat protein source, vegetable/fruit, <25% of plate complex carbohydrates.  8. Separate fluids 30 minutes before/after meal times.  9. Practice eating off of smaller plates/bowls, chewing to applesauce consistency, taking 20-30 minutes to eat in a calm/relaxed environment without distractions of tv/email/cell phone.    Handouts provided:  18 months and beyond Post-Op Nutritional Guidelines for LSG  Protein supplements  Protein sources    Assessment/Plan:    Pt to follow up in 2.5 months with KARRIE      Video-Visit Details    Type of service:  Video Visit    Video End Time:10:35a    Originating Location (pt. Location): Home    Distant Location (provider location):  [facility] [place]     Platform used for Video Visit: Reginald CARDENAS RD          Again, thank you for allowing me to participate in the care of your patient.        Sincerely,        MORAIMA CARDENAS RD    "

## 2022-09-01 NOTE — PROGRESS NOTES
Russell Gomez is a 32 year old who is being evaluated via a billable video visit.      How would you like to obtain your AVS? MyChart  If the video visit is dropped, the invitation should be resent by: Send to e-mail at: [email or web address]  Will anyone else be joining your video visit? No    Video Start Time: 9:58 AM      Post-op Surgical Weight Loss Diet Evaluation     Assessment:  Pt presents for 2 years post-op RD visit, s/p LSG on 10-27-20 with Dr. Richey. Today we reviewed current eating habits and level of physical activity, and instructed on the changes that are required for successful bariatric outcomes.    Patient Progress: baby is nearly 1 year old, working to get protein in  -taking phentermine- feels like this is giving her a boost  -states she is low energy with working a lot, taking care of the home- feels this leads to snacking or choosing foods she is not supposed to  -goal weight is 160lb    Initial weight: 266lb  Current weight: 180lb  Weight change: 86lb down    Body mass index is 30.9 kg/m .    Patient Active Problem List   Diagnosis     Morbid obesity (H)     Anemia     Rh negative state in antepartum period, second trimester     History of bariatric surgery     History of gestational diabetes mellitus     Status post bilateral salpingectomy     Single liveborn infant delivered vaginally       Vitamins   Multi Vit with Iron: had stopped taking pre-bebo but has started this again a couple weeks ago  Calcium Citrate: yes  B12: yes  D3: yes    Do you experience hunger? yes  Do you experience any reflux or discomfort with eating? no  Nausea: no  Vomiting: yes, if eating too quickly  Diarrhea: no  Constipation: no  Hair loss:no    Diet Recall/Time:   Breakfast: couple eggs and banana OR protein shake  Lunch: skipping this a lot- might have something around 1pm, drive through- 6 piece chicken nugget, burger without the bun, etc.     Foods not tolerated: apples    Meal Duration:20  "min    Fluid-meal separation:  Fluids are  30min before and 30 minutes after meals.    Fluid Intake  Water: drinking a lot of water  Caffeine: 1 cup coffee daily    Exercise: not moving as much as she used to- tired and busy  -bought a waist  and wears at work- advised against this  -weighted [place] but has not used this    PES statement:      (NC-1.4) Altered GI Function related to Alteration in gastrointestinal tract structure and/or function/ Decreased functional length of the GI tract as evidenced by Weight loss of 32.33% initial body weight; sleeve gastrectomy  (NB-1.7) Undesirable food choices related to Failure to adjust for lifestyle changes as evidenced by low protein intake at meals; large portions; skipping meals; frequent grazing;  mindless eating ; drinking with meals, not chewing each bite to applesauce consistency; consuming caffeine/carbonation daily, frequent restaurant intake; and no structured activity regimen  (NI-5.7.1) Inadequate protein intake related to Gastric bypass causing increased nutrient needs due to malabsorption/ Decreased ability to consume sufficient protein as evidenced by Edema, poor musculature, dull skin, thin and fragile hair; and Estimated intake of protein insufficient to meet requirements    Intervention    Discussion    1. Recommended to consume 15-20gm protein at 3 meals daily, along with protein supplement/\"planned protein containing snack\" of 15-30gm protein, to reach goal of 60-80 gm protein daily.  2. Reviewed lean protein sources  3. Bariatric Plate Method-  including lean/low fat protein at each meal, including a vegetable/fruit, and limiting carbohydrate intake to less than 25% of plate volume.     Instructions  1. Include 15-20gm protein at each meal, along with protein supplement/\"planned protein containing snack\" of 15-30gm protein, to reach goal of 60-80 gm protein daily.  2. Increase fluid intake to 64oz daily: choose plain or " calorie/carbonation-free beverages.  3. Incorporate daily structured activity, 30-60 minutes most days of the week  4. Recommended pt to start taking: Multi Vit + iron 2x/day, calcium citrate 400-600 mg 2x/day, 9465-8432 mcg of Sublingual B-12 daily, and 5000 IU Vitamin D3 daily. (MVI and calcium can be taken at the same time)  5. Read food labels more consistently: keeping total fat grams <10, total sugar grams <10, fiber >3gm per serving.  6. Increase vegetable/fruit intake, by having a vegetable or fruit with each meal daily.  7. Practice plate method: 1/2 plate lean/low fat protein source, vegetable/fruit, <25% of plate complex carbohydrates.  8. Separate fluids 30 minutes before/after meal times.  9. Practice eating off of smaller plates/bowls, chewing to applesauce consistency, taking 20-30 minutes to eat in a calm/relaxed environment without distractions of tv/email/cell phone.    Handouts provided:  18 months and beyond Post-Op Nutritional Guidelines for LSG  Protein supplements  Protein sources    Assessment/Plan:    Pt to follow up in 2.5 months with KARRIE      Video-Visit Details    Type of service:  Video Visit    Video End Time:10:35a    Originating Location (pt. Location): Home    Distant Location (provider location):  [facility] [place]     Platform used for Video Visit: Reginald CARDENAS RD

## 2022-11-16 ENCOUNTER — VIRTUAL VISIT (OUTPATIENT)
Dept: SURGERY | Facility: CLINIC | Age: 32
End: 2022-11-16
Payer: COMMERCIAL

## 2022-11-16 DIAGNOSIS — Z71.3 NUTRITIONAL COUNSELING: ICD-10-CM

## 2022-11-16 DIAGNOSIS — K91.2 POSTOPERATIVE MALABSORPTION: ICD-10-CM

## 2022-11-16 DIAGNOSIS — Z98.84 BARIATRIC SURGERY STATUS: Primary | ICD-10-CM

## 2022-11-16 PROCEDURE — 97803 MED NUTRITION INDIV SUBSEQ: CPT | Mod: GT | Performed by: DIETITIAN, REGISTERED

## 2022-11-16 NOTE — LETTER
11/16/2022         RE: Russell Gomez  [address] [place] [address]        Dear Colleague,    Thank you for referring your patient, Russell Gomez, to the [facility] [place]. Please see a copy of my visit note below.    Russell Gomez is a 32 year old who is being evaluated via a billable video visit.      How would you like to obtain your AVS? MyChart  If the video visit is dropped, the invitation should be resent by: Send to e-mail at: [email or web address]  Will anyone else be joining your video visit? No  {If patient encounters technical issues they should call [phone]      Post-op Surgical Weight Loss Diet Evaluation     Assessment:  Pt presents for 2 years post-op RD visit, s/p LSG on 10/27/2020 with Dr. Richey. Today we reviewed current eating habits and level of physical activity, and instructed on the changes that are required for successful bariatric outcomes.    Patient Progress: Taking Phentermine to help with appetite suppression      Initial weight: 266 lbs  Current weight: 180 lbs (last updated weight)   Weight change: 86 lbs (down)    There is no height or weight on file to calculate BMI.    Patient Active Problem List   Diagnosis     Morbid obesity (H)     Anemia     Rh negative state in antepartum period, second trimester     History of bariatric surgery     History of gestational diabetes mellitus     Status post bilateral salpingectomy     Single liveborn infant delivered vaginally       Vitamins   Multi Vit with Iron: yes  Calcium Citrate: yes  B12: yes  D3: yes      Diet Recall/Time:   Breakfast: Protein Shake (30 g), Banana  Lunch: Protein Shake (30 g)  Usually takes a nap after work due to being tired  Dinner: skipped (due to being tired) or salad or corn dog or ground turkey     Typical Snacks: string cheese (here and there)     Proteins/Veg/Fruits/CHO (NOT well tolerated): hashbrowns, beans, apple    Estimated protein  "intake: 60 grams    Meal Duration:20 minutes    Fluid-meal separation:  Fluids are  30min before and 30 minutes after meals.    Fluid Intake  Water: all day long  Caffeine: 1 cup of coffee (on occasion, not daily)    Exercise: no set regimen-due to tired during the day and not getting good sleep      PES statement:      (NB-1.7) Undesirable food choices related to Failure to adjust for lifestyle changes as evidenced by low protein intake at meals; large portions; skipping meals; frequent grazing;  mindless eating ; drinking with meals, not chewing each bite to applesauce consistency; consuming caffeine/carbonation daily, frequent restaurant intake; and no structured activity regimen      Intervention    Discussion  1. Recommended to consume 15-20gm protein at 3 meals daily, along with protein supplement/\"planned protein containing snack\" of 15-30gm protein, to reach goal of 60-80 gm protein daily.  2. Educated on post-op vitamin regimen: Multi Vit + iron 2x/day, calcium citrate 400-600 mg 2x/day, 6150-8271 mcg of Sublingual B-12 daily, and 5000 IU Vitamin D3 daily (MVI and calcium can be taken at the same time BID)  3. Reviewed lean protein sources  4. Bariatric Plate Method-  including lean/low fat protein at each meal, including a vegetable/fruit, and limiting carbohydrate intake to less than 25% of plate volume.     Instructions  1. Include 15-20gm protein at each meal, along with protein supplement/\"planned protein containing snack\" of 15-30gm protein, to reach goal of 60-80 gm protein daily.  2. Increase fluid intake to 64oz daily: choose plain or calorie/carbonation-free beverages.  3. Incorporate daily structured activity, 30-60 minutes most days of the week  4. Recommended pt to start taking: Multi Vit + iron 2x/day, calcium citrate 400-600 mg 2x/day, 8824-3467 mcg of Sublingual B-12 daily, and 5000 IU Vitamin D3 daily. (MVI and calcium can be taken at the same time)  5. Read food labels more " consistently: keeping total fat grams <10, total sugar grams <10, fiber >3gm per serving.  6. Increase vegetable/fruit intake, by having a vegetable or fruit with each meal daily.  7. Practice plate method: 1/2 plate lean/low fat protein source, vegetable/fruit, <25% of plate complex carbohydrates.  8. Separate fluids 30 minutes before/after meal times.  9. Practice eating off of smaller plates/bowls, chewing to applesauce consistency, taking 20-30 minutes to eat in a calm/relaxed environment without distractions of tv/email/cell phone.    Handouts provided:  None    Assessment/Plan:    Pt to follow up for 2.5 years post-op visit with bariatrician     Video-Visit Details    Type of service:  Video Visit    Video Start Time (time video started): 1:26 pm     Video End Time (time video stopped): 2:09 pm    Originating Location (pt. Location): Home        Distant Location (provider location):  Off-site    Mode of Communication:  Video Conference via [facility]    Physician has received verbal consent for a Video Visit from the patient? Yes    Iman Lozoya RD              Again, thank you for allowing me to participate in the care of your patient.        Sincerely,        Iman Lozoya RD

## 2022-11-16 NOTE — PROGRESS NOTES
Russell Gomez is a 32 year old who is being evaluated via a billable video visit.      How would you like to obtain your AVS? MyChart  If the video visit is dropped, the invitation should be resent by: Send to e-mail at: [email or web address]  Will anyone else be joining your video visit? No  {If patient encounters technical issues they should call [phone]      Post-op Surgical Weight Loss Diet Evaluation     Assessment:  Pt presents for 2 years post-op RD visit, s/p LSG on 10/27/2020 with Dr. Richey. Today we reviewed current eating habits and level of physical activity, and instructed on the changes that are required for successful bariatric outcomes.    Patient Progress: Taking Phentermine to help with appetite suppression      Initial weight: 266 lbs  Current weight: 180 lbs (last updated weight)   Weight change: 86 lbs (down)    There is no height or weight on file to calculate BMI.    Patient Active Problem List   Diagnosis     Morbid obesity (H)     Anemia     Rh negative state in antepartum period, second trimester     History of bariatric surgery     History of gestational diabetes mellitus     Status post bilateral salpingectomy     Single liveborn infant delivered vaginally       Vitamins   Multi Vit with Iron: yes  Calcium Citrate: yes  B12: yes  D3: yes      Diet Recall/Time:   Breakfast: Protein Shake (30 g), Banana  Lunch: Protein Shake (30 g)  Usually takes a nap after work due to being tired  Dinner: skipped (due to being tired) or salad or corn dog or ground turkey     Typical Snacks: string cheese (here and there)     Proteins/Veg/Fruits/CHO (NOT well tolerated): hashbrowns, beans, apple    Estimated protein intake: 60 grams    Meal Duration:20 minutes    Fluid-meal separation:  Fluids are  30min before and 30 minutes after meals.    Fluid Intake  Water: all day long  Caffeine: 1 cup of coffee (on occasion, not daily)    Exercise: no set regimen-due to tired during the day  "and not getting good sleep      PES statement:      (NB-1.7) Undesirable food choices related to Failure to adjust for lifestyle changes as evidenced by low protein intake at meals; large portions; skipping meals; frequent grazing;  mindless eating ; drinking with meals, not chewing each bite to applesauce consistency; consuming caffeine/carbonation daily, frequent restaurant intake; and no structured activity regimen      Intervention    Discussion  1. Recommended to consume 15-20gm protein at 3 meals daily, along with protein supplement/\"planned protein containing snack\" of 15-30gm protein, to reach goal of 60-80 gm protein daily.  2. Educated on post-op vitamin regimen: Multi Vit + iron 2x/day, calcium citrate 400-600 mg 2x/day, 4753-8204 mcg of Sublingual B-12 daily, and 5000 IU Vitamin D3 daily (MVI and calcium can be taken at the same time BID)  3. Reviewed lean protein sources  4. Bariatric Plate Method-  including lean/low fat protein at each meal, including a vegetable/fruit, and limiting carbohydrate intake to less than 25% of plate volume.     Instructions  1. Include 15-20gm protein at each meal, along with protein supplement/\"planned protein containing snack\" of 15-30gm protein, to reach goal of 60-80 gm protein daily.  2. Increase fluid intake to 64oz daily: choose plain or calorie/carbonation-free beverages.  3. Incorporate daily structured activity, 30-60 minutes most days of the week  4. Recommended pt to start taking: Multi Vit + iron 2x/day, calcium citrate 400-600 mg 2x/day, 1505-0364 mcg of Sublingual B-12 daily, and 5000 IU Vitamin D3 daily. (MVI and calcium can be taken at the same time)  5. Read food labels more consistently: keeping total fat grams <10, total sugar grams <10, fiber >3gm per serving.  6. Increase vegetable/fruit intake, by having a vegetable or fruit with each meal daily.  7. Practice plate method: 1/2 plate lean/low fat protein source, vegetable/fruit, <25% of plate complex " carbohydrates.  8. Separate fluids 30 minutes before/after meal times.  9. Practice eating off of smaller plates/bowls, chewing to applesauce consistency, taking 20-30 minutes to eat in a calm/relaxed environment without distractions of tv/email/cell phone.    Handouts provided:  None    Assessment/Plan:    Pt to follow up for 2.5 years post-op visit with bariatrician     Video-Visit Details    Type of service:  Video Visit    Video Start Time (time video started): 1:26 pm     Video End Time (time video stopped): 2:09 pm    Originating Location (pt. Location): Home        Distant Location (provider location):  Off-site    Mode of Communication:  Video Conference via [facility]    Physician has received verbal consent for a Video Visit from the patient? Yes    Iman Lozoya, RD

## 2022-11-21 ENCOUNTER — HEALTH MAINTENANCE LETTER (OUTPATIENT)
Age: 32
End: 2022-11-21

## 2023-05-04 ENCOUNTER — OFFICE VISIT (OUTPATIENT)
Dept: SURGERY | Facility: CLINIC | Age: 33
End: 2023-05-04
Payer: COMMERCIAL

## 2023-05-04 ENCOUNTER — LAB (OUTPATIENT)
Dept: LAB | Facility: CLINIC | Age: 33
End: 2023-05-04
Payer: COMMERCIAL

## 2023-05-04 VITALS
DIASTOLIC BLOOD PRESSURE: 82 MMHG | HEIGHT: 64 IN | SYSTOLIC BLOOD PRESSURE: 110 MMHG | BODY MASS INDEX: 32.42 KG/M2 | WEIGHT: 189.9 LBS

## 2023-05-04 DIAGNOSIS — K91.2 POSTOPERATIVE MALABSORPTION: Primary | ICD-10-CM

## 2023-05-04 DIAGNOSIS — E66.811 OBESITY, CLASS I, BMI 30.0-34.9 (SEE ACTUAL BMI): ICD-10-CM

## 2023-05-04 DIAGNOSIS — K91.2 POSTOPERATIVE MALABSORPTION: ICD-10-CM

## 2023-05-04 LAB
ALBUMIN SERPL BCG-MCNC: 4.2 G/DL (ref 3.5–5.2)
ALP SERPL-CCNC: 60 U/L (ref 35–104)
ALT SERPL W P-5'-P-CCNC: 14 U/L (ref 10–35)
ANION GAP SERPL CALCULATED.3IONS-SCNC: 13 MMOL/L (ref 7–15)
AST SERPL W P-5'-P-CCNC: 31 U/L (ref 10–35)
BILIRUB SERPL-MCNC: 0.2 MG/DL
BUN SERPL-MCNC: 13.2 MG/DL (ref 6–20)
CALCIUM SERPL-MCNC: 9.4 MG/DL (ref 8.6–10)
CHLORIDE SERPL-SCNC: 104 MMOL/L (ref 98–107)
CHOLEST SERPL-MCNC: 129 MG/DL
CREAT SERPL-MCNC: 0.7 MG/DL (ref 0.51–0.95)
DEPRECATED HCO3 PLAS-SCNC: 23 MMOL/L (ref 22–29)
ERYTHROCYTE [DISTWIDTH] IN BLOOD BY AUTOMATED COUNT: 15 % (ref 10–15)
FERRITIN SERPL-MCNC: 21 NG/ML (ref 6–175)
FOLATE SERPL-MCNC: >40 NG/ML (ref 4.6–34.8)
GFR SERPL CREATININE-BSD FRML MDRD: >90 ML/MIN/1.73M2
GLUCOSE SERPL-MCNC: 70 MG/DL (ref 70–99)
HBA1C MFR BLD: 5 % (ref 0–5.6)
HCT VFR BLD AUTO: 35.8 % (ref 35–47)
HDLC SERPL-MCNC: 68 MG/DL
HGB BLD-MCNC: 11.4 G/DL (ref 11.7–15.7)
LDLC SERPL CALC-MCNC: 54 MG/DL
MCH RBC QN AUTO: 23.6 PG (ref 26.5–33)
MCHC RBC AUTO-ENTMCNC: 31.8 G/DL (ref 31.5–36.5)
MCV RBC AUTO: 74 FL (ref 78–100)
NONHDLC SERPL-MCNC: 61 MG/DL
PLATELET # BLD AUTO: 242 10E3/UL (ref 150–450)
POTASSIUM SERPL-SCNC: 4.3 MMOL/L (ref 3.4–5.3)
PROT SERPL-MCNC: 7.5 G/DL (ref 6.4–8.3)
PTH-INTACT SERPL-MCNC: 34 PG/ML (ref 15–65)
RBC # BLD AUTO: 4.83 10E6/UL (ref 3.8–5.2)
SODIUM SERPL-SCNC: 140 MMOL/L (ref 136–145)
TRIGL SERPL-MCNC: 35 MG/DL
VIT B12 SERPL-MCNC: 2273 PG/ML (ref 232–1245)
WBC # BLD AUTO: 8.5 10E3/UL (ref 4–11)

## 2023-05-04 PROCEDURE — 82306 VITAMIN D 25 HYDROXY: CPT

## 2023-05-04 PROCEDURE — 82728 ASSAY OF FERRITIN: CPT

## 2023-05-04 PROCEDURE — 84425 ASSAY OF VITAMIN B-1: CPT | Mod: 90

## 2023-05-04 PROCEDURE — 84590 ASSAY OF VITAMIN A: CPT | Mod: 90

## 2023-05-04 PROCEDURE — 83036 HEMOGLOBIN GLYCOSYLATED A1C: CPT

## 2023-05-04 PROCEDURE — 36415 COLL VENOUS BLD VENIPUNCTURE: CPT

## 2023-05-04 PROCEDURE — 83970 ASSAY OF PARATHORMONE: CPT

## 2023-05-04 PROCEDURE — 85027 COMPLETE CBC AUTOMATED: CPT

## 2023-05-04 PROCEDURE — 82746 ASSAY OF FOLIC ACID SERUM: CPT

## 2023-05-04 PROCEDURE — 82607 VITAMIN B-12: CPT

## 2023-05-04 PROCEDURE — 84630 ASSAY OF ZINC: CPT | Mod: 90

## 2023-05-04 PROCEDURE — 80061 LIPID PANEL: CPT

## 2023-05-04 PROCEDURE — 99214 OFFICE O/P EST MOD 30 MIN: CPT | Performed by: FAMILY MEDICINE

## 2023-05-04 PROCEDURE — 80053 COMPREHEN METABOLIC PANEL: CPT

## 2023-05-04 PROCEDURE — 99000 SPECIMEN HANDLING OFFICE-LAB: CPT

## 2023-05-04 RX ORDER — SEMAGLUTIDE 1 MG/.5ML
1 INJECTION, SOLUTION SUBCUTANEOUS WEEKLY
Qty: 2 ML | Refills: 0 | Status: SHIPPED | OUTPATIENT
Start: 2023-05-04 | End: 2023-06-01

## 2023-05-04 RX ORDER — SEMAGLUTIDE 0.25 MG/.5ML
0.25 INJECTION, SOLUTION SUBCUTANEOUS WEEKLY
Qty: 2 ML | Refills: 0 | Status: SHIPPED | OUTPATIENT
Start: 2023-05-04 | End: 2023-06-01

## 2023-05-04 RX ORDER — SEMAGLUTIDE 0.5 MG/.5ML
0.5 INJECTION, SOLUTION SUBCUTANEOUS WEEKLY
Qty: 2 ML | Refills: 0 | Status: SHIPPED | OUTPATIENT
Start: 2023-05-04 | End: 2023-06-08

## 2023-05-04 NOTE — PATIENT INSTRUCTIONS
[facility]ariat[facility]  Nutritional Guidelines  Gastric Sleeve 18 Months Post Op and Beyond    General Guidelines and Helpful Hints:  Eat 3 meals per day + protein supplement(s). No snacks between meals.  Do not skip meals.  This can cause overeating at the next meal and will prevent adequate protein and nutritional intake.  Aim for 60-80 grams of protein per day.  Always eat your protein first. This assists with optimal nutrition and helps you stay full longer.  Depending on your portion size, you may need to drink approved protein supplement between meals to achieve protein goals. Follow recommendations of your Dietitian.   Eat your protein first, and then follow with fiber.   It is not necessary to count your fiber, but 15-20 grams per day is recommended.    Add fiber by including fruits, vegetables, whole grains, and beans.   Portions should remain about 1 cup per meal. Use measuring cups to be accurate.  Continue to use saucer/salad plates, infant/toddler silverware to keep portion sizes small and take small bites.  Eat S-L-O-W-L-Y to make each meal last 20-30 minutes. Always stop eating when satisfied.  Continue to use caution with foods containing skins, peels or membranes. Chew well!  Aim for 64 oz. of calorie-free fluids daily.  Continue to avoid caffeine and carbonation. If you choose to drink alcohol, do so in moderation.   Remember to avoid drinking during meals, 15-30 minutes before and 30 minutes after.  Exercise is [place] for continued weight loss and weight maintenance. Aim for 30-60 minutes of physical activity most days of the week. Include cardiovascular and strength training.  If having trouble tolerating meat, try using a crock-pot, tinfoil tent, steamer or other moist cooking method to create tender meats. Add broth or low-fat gravy to help meat stay moist.   Avoid high sugar and high fat foods to prevent high calorie intakes and a reduced rate of weight loss, or weight regain.  Check nutrition  labels for less than 10 grams of sugar and less than 10 grams of fat per serving.  Continue Taking Vitamins/Minerals:  5405-1993 mcg of Sublingual B-12 daily  1 Multivitamin with Iron twice daily (chewable or swallow tabs)  500-600 mg Calcium Citrate twice daily (chewable or swallow tabs)  5000 IU Vitamin D3 daily    Sample Grocery List    Protein:  Fat free Greek or light yogurt (less than 10 grams sugar)  Fat free or low-fat cottage cheese  String cheese or reduced fat cheese slices  Tuna, salmon, crab, egg, or chicken salad made with light or fat free mayonnaise  Egg or Egg Substitute  Lean/extra lean turkey, beef, bison, venison (ground, sirloin, round, flank)  Pork loin or tenderloin (grilled, baked, broiled)  Fish such as salmon, tuna, trout, tilapia, etc. (grilled, baked, broiled)  Tender cuts of lean (skinless) turkey or chicken  Lean deli meats: turkey, lean ham, chicken, lean roast beef  Beans such as kidney, garbanzo, black, rachel, or low-fat/fat free refried beans  Peanut butter (natural preferred). Limit to 1 Tbsp. per day.  Low-fat meatloaf (made with lean ground beef or turkey)  Sloppy Joes made with low-sugar ketchup and lean ground beef or turkey  Soy or vegetable protein (i.e. vegan crumbles, soy/veggie burger, tofu)  Hummus    Vegetables:  Fresh: cooked or raw (as tolerated)  Frozen vegetables  Canned vegetables (low sodium or no salt added, rinse before cooking/eating)  (Ok to have skins/peels/membranes/seeds - just chew well)    Fruits:  Fresh fruit  Frozen fruit (no sugar added)  Canned fruit (packed in its own juice, NOT syrup)  (Ok to have skins/peels/membranes/seeds - just chew well)    Starch:  Unsweetened whole-grain hot cereal (or high fiber cold cereal, dry)  Toasted whole wheat bread or [place]ix Thins  Whole grain crackers  Baked /boiled/mashed potato/sweet potato  Cooked whole grain pasta, brown rice, or other cooked whole grains  Starchy vegetables: corn, peas, winter  squash    Protein Supplement:   Ready to drink protein shake with:  15-30 grams protein per serving  Less than 10 grams total carbohydrate per serving   Protein powder mixed with:   Skim or 1% milk  Low fat or fat free Lactaid milk, plain or no sugar added soymilk  Water     Fats: (use in moderation)  1 teaspoon of soft tub margarine  1 teaspoon olive oil, canola oil, or peanut oil  1 tablespoon of low-fat ghosh or salad dressing     Sample Menu for 18+ months after Gastric Sleeve    You do NOT need to eat/drink the full portion sizes listed below  Always stop when you are satisfied    Breakfast   cup 1% cottage cheese     cup mixed berries   Lunch 2 oz lean roast beef on   [place] Thin with 1 tsp. light ghosh    small tomato, chopped, mixed with 1 tsp. light vinaigrette dressing   Supplement Approved protein supplement (if needed between meals)   Dinner 2 oz grilled salmon    cup salad greens with 1 tsp. light salad dressing and 1 tsp. ground flax seed    cup quinoa or brown rice     Breakfast   cup egg substitute with   cup sautéed chopped vegetables  2 light [place] Krisp crackers   Lunch Tuna Melt:   cup tuna mixed with 1 tsp. light ghosh over   [place]iana Thin. Top with 2-3 slices cucumber and 1 oz slice of low fat cheese   Supplement 1 cup skim milk (if needed between meals)   Dinner 3 oz  grilled, broiled, or baked seasoned skinless chicken breast    cup asparagus     Breakfast   cup plain oatmeal made with skim or 1% milk with 1 Tbsp. flavored/unflavored protein powder added  1 mozzarella string cheese   Lunch 2 oz deli turkey breast  1/3 cup salad with 1 tsp. light salad dressing, 1/8 of a whole avocado and 1 Tbsp. sunflower seeds   Dinner 3 oz. pork loin made in a crock pot, seasoned with a spice rub    cup cooked carrots   Supplement Approved protein supplement (if needed between meals)     Breakfast 1 cup breakfast casserole made with egg substitute, turkey sausage,  and steamed, chopped bell peppers    Supplement  1 cup light Greek yogurt (if needed between meals)   Lunch 2 oz. teriyaki turkey    cup mashed sweet potato with 1-2 spritzes of spray butter (like [place])    cup fresh pineapple   Dinner 3 oz low fat meatloaf    cup roasted garlic zucchini     Breakfast   cup leftover breakfast casserole    cup no sugar added applesauce with 1 Tbsp. unflavored protein powder and a sprinkle of cinnamon    Lunch 3 oz shrimp with 1-2 Tbsp. low-sugar cocktail sauce for dipping    c. whole wheat pasta drizzled with   tsp. olive oil   Supplement 1 cup skim/1% milk with scoop of protein powder (if needed between meals)   Dinner Grilled, seasoned kebob with 2 oz lean beef and   cup vegetables     Breakfast Breakfast pizza:   [place] Thin spread with 1 Tbsp. low sugar spaghetti sauce,   cup shredded low fat cheese, melted and 1 slice of [place] keen     cup fresh fruit mixed with chopped almonds   Lunch   cup black bean soup  4-5 whole grain crackers   Dinner 3 oz  tilapia with lemon pepper seasoning    cup stewed tomatoes   Supplement 1 string cheese (if needed between meals)     Breakfast 2 hard boiled eggs (discard 1 egg yolk)    whole wheat English Muffin with 1 tsp. low sugar jelly   Lunch   cup leftover black bean soup topped with 1-2 Tbsp. low fat cheese  2-3 light [place] Krisp crackers   Supplement Approved protein supplement (if needed between meals)   Dinner 3 oz sirloin steak    cup steamed broccoli

## 2023-05-04 NOTE — LETTER
5/4/2023         RE: Russell Gomez  [address] [place] [address]        Dear Colleague,    Thank you for referring your patient, Russell Gomez, to the [facility] [place]. Please see a copy of my visit note below.    Bariatric Follow Up Visit with a History of Previous Bariatric Surgery     Date of visit: 5/4/2023  Physician: Jinny Pierre MD, MD  Primary Care Provider:  Lali Humphries   33 year old  female    Date of Surgery: 10/27/2020  Initial Weight: 266#  Initial BMI: 45.74  Today's Weight:   Wt Readings from Last 1 Encounters:   05/04/23 86.1 kg (189 lb 14.4 oz)     Body mass index is 32.6 kg/m .      Assessment and Plan     Assessment: Russell is a 33 year old year old female who is 18 mo s/p  Sleeve Gastrectomy with Dr. Rossi Gomez feels as if she has achieved the goals she hoped to accomplish through bariatric surgery and weight loss.    Encounter Diagnoses   Name Primary?     Postoperative malabsorption Yes     Obesity, Class I, BMI 30.0-34.9 (see actual BMI)          Current Outpatient Medications:      calcium carbonate (OS-BRYANNA) 500 MG tablet, Take 1 tablet by mouth 2 times daily, Disp: , Rfl:      cyanocobalamin (VITAMIN B-12) 1000 MCG SUBL sublingual tablet, Place 1,000 mcg under the tongue daily, Disp: , Rfl:      ergocalciferol (ERGOCALCIFEROL) 1.25 MG (91531 UT) capsule, Take 50,000 Units by mouth, Disp: , Rfl:      ferrous fumarate 65 mg, "[employer]. FE,-Vitamin C 125 mg (VITRON C)  MG TABS tablet, Take 1 tablet by mouth daily, Disp: 90 tablet, Rfl: 3     Pediatric Multi Vit-Extra C-FA (FLINTSTONES/EXTRA C) CHEW, Take 1 tablet by mouth, Disp: , Rfl:      phentermine (ADIPEX-P) 37.5 MG tablet, TAKE 1/2 TO 1 (ONE-HALF TO ONE) TABLET BY MOUTH ONCE DAILY IN THE MORNING BEFORE BREAKFAST, Disp: 90 tablet, Rfl: 1     Semaglutide-Weight Management (WEGOVY) 0.25 MG/0.5ML pen, Inject 0.25 mg  Subcutaneous once a week for 28 days, Disp: 2 mL, Rfl: 0     Semaglutide-Weight Management (WEGOVY) 0.5 MG/0.5ML pen, Inject 0.5 mg Subcutaneous once a week for 28 days, Disp: 2 mL, Rfl: 0     Semaglutide-Weight Management (WEGOVY) 1 MG/0.5ML pen, Inject 1 mg Subcutaneous once a week for 28 days, Disp: 2 mL, Rfl: 0     Vitamin D, Cholecalciferol, 25 MCG (1000 UT) TABS, , Disp: , Rfl:      Plan: Wegovy, dietitian, back to exercise. Annual labs. Continue vitamins with consistency    Return in about 3 months (around 8/4/2023).    Bariatric Surgery Review     Interim History/LifeChanges: Moved 5 days ago. Living with her children. Taking vitamins. Hair loss. Periods monthly with few heavy days    Patient Concerns: weight regain  Appetite (1-10): up  GERD: no    Medication changes: no    Vitamin Intake:   B-12   SL   MVI  2/d   Vitamin D  5,000   Calcium   citrate     Other                LABS: ordered    Nausea occ  Vomiting rare  Constipation no  Diarrhea no  Rashes no  Hair Loss yes  Calf tenderness no  Breathing difficulty no  Reactive Hypoglycemia no  Light Headedness no   Moods euthymic    12 point ROS as above and otherwise negative      Habits:  Alcohol: no  Tobacco: no  Caffeine no  NSAIDS no  Exercise Routine: no  3 meals/day yes  Protein first yes  60+grams/day  Water Separate from meals yes  Calorie Containing Beverages no  Restaurant eating/wk no  Sleeping not enough  Stress high-just moved  CPAP: no  Contraception: TL  DEXA:not indicated for age <45    Social History     Social History     Socioeconomic History     Marital status: Single     Spouse name: Not on file     Number of children: Not on file     Years of education: Not on file     Highest education level: Not on file   Occupational History     Not on file   Tobacco Use     Smoking status: Never     Smokeless tobacco: Never   Vaping Use     Vaping status: Not on file   Substance and Sexual Activity     Alcohol use: No     Drug use: No     Sexual  activity: Yes     Partners: Male     Birth control/protection: Condom   Other Topics Concern     Not on file   Social History Narrative    Single, one son, Daughter born 10/2021 Working [employer]. Enjoys her job.   Son's father is in [place] and not involved  Close with her father who lives in [place]  Not close with her mother who lives here   Miscarried twin boys. Looks after her 17 yo niece who has learning challenges and watches her kids for her while she is at work. Living with her kids now without the father of her youngest daughter.     Social Determinants of Health     Financial Resource Strain: Not on file   Food Insecurity: Not on file   Transportation Needs: Not on file   Physical Activity: Not on file   Stress: Not on file   Social Connections: Not on file   Intimate Partner Violence: Not on file   Housing Stability: Not on file       Past Medical History     Past Medical History:   Diagnosis Date     Acanthosis nigricans      Accessory skin tags      Anemia      Asthma      Bacterial vaginosis 2019    3/21/19     Cervical insufficiency during pregnancy in second trimester, antepartum 2019     Chlamydia      Depression      Dichorionic diamniotic twin pregnancy in second trimester 2019     Diet controlled gestational diabetes mellitus (GDM) in second trimester 2019     Gestational diabetes      Gonococcal infection (acute) of lower genitourinary tract     Created by Conversion      Heartburn      Herpes simplex      Hirsutism      History of  delivery, currently pregnant in first trimester 2019     Low back pain      Menorrhagia      Morbid obesity (H)       labor 2011     Single liveborn infant delivered vaginally 10/13/2021     Status post bilateral salpingectomy 10/13/2021     Problem List     Patient Active Problem List   Diagnosis     Morbid obesity (H)     Anemia     Rh negative state in antepartum period, second trimester     History of  "bariatric surgery     History of gestational diabetes mellitus     Status post bilateral salpingectomy     Single liveborn infant delivered vaginally     Medications       Current Outpatient Medications:      calcium carbonate (OS-BRYANNA) 500 MG tablet, Take 1 tablet by mouth 2 times daily, Disp: , Rfl:      cyanocobalamin (VITAMIN B-12) 1000 MCG SUBL sublingual tablet, Place 1,000 mcg under the tongue daily, Disp: , Rfl:      ergocalciferol (ERGOCALCIFEROL) 1.25 MG (52422 UT) capsule, Take 50,000 Units by mouth, Disp: , Rfl:      ferrous fumarate 65 mg, [place]. FE,-Vitamin C 125 mg (VITRON C)  MG TABS tablet, Take 1 tablet by mouth daily, Disp: 90 tablet, Rfl: 3     Pediatric Multi Vit-Extra C-FA (FLINTSTONES/EXTRA C) CHEW, Take 1 tablet by mouth, Disp: , Rfl:      phentermine (ADIPEX-P) 37.5 MG tablet, TAKE 1/2 TO 1 (ONE-HALF TO ONE) TABLET BY MOUTH ONCE DAILY IN THE MORNING BEFORE BREAKFAST, Disp: 90 tablet, Rfl: 1     Semaglutide-Weight Management (WEGOVY) 0.25 MG/0.5ML pen, Inject 0.25 mg Subcutaneous once a week for 28 days, Disp: 2 mL, Rfl: 0     Semaglutide-Weight Management (WEGOVY) 0.5 MG/0.5ML pen, Inject 0.5 mg Subcutaneous once a week for 28 days, Disp: 2 mL, Rfl: 0     Semaglutide-Weight Management (WEGOVY) 1 MG/0.5ML pen, Inject 1 mg Subcutaneous once a week for 28 days, Disp: 2 mL, Rfl: 0     Vitamin D, Cholecalciferol, 25 MCG (1000 UT) TABS, , Disp: , Rfl:    Surgical History     Past Surgical History  She has a past surgical history that includes Tonsillectomy; Pr Lap, Chanel Restrict Proc, Longitudinal Gastrectomy (N/A, 10/27/2020); and Laparotomy mini, tubal ligation (post partum), combined (Bilateral, 10/11/2021).    Objective-Exam     Constitutional:  /82 (BP Location: Right arm, Patient Position: Sitting)   Ht 1.626 m (5' 4\")   Wt 86.1 kg (189 lb 14.4 oz)   BMI 32.60 kg/m      General:  Pleasant and in no acute distress   Eyes:  EOMI  ENT:  Airway 1+  Moist mucous membranes  Neck:  " Supple, No LAD, No thyromegaly, No carotid bruits appreciated  Respiratory: Normal respiratory effort, no cough, wheezes or crackles  CV:  Regular rate and Rhythm,nomurmurs, pulses 2+, no calf tenderness, no LE edema  Gastrointestinal: Abdomen NT/ND, BS+  Musculoskeletal: muscle mass WNL  Skin: color normal hair normal, incisions nicely healed  Neurological: No tremor, normal gait  Psychiatric: alert and oriented X3, mood and affect normal    Counseling     We reviewed the important post op bariatric recommendations:  -eating 3 meals daily  -eating protein first, getting >60gm protein daily  -eating slowly, chewing food well  -avoiding/limiting calorie containing beverages  -drinking water 15-30 minutes before or after meals  -choosing wheat, not white with breads, crackers, pastas, edwina, bagels, tortillas, rice  -limiting restaurant or cafeteria eating to twice a week or less    We discussed the importance of restorative sleep and stress management in maintaining a healthy weight.  We discussed the National Weight Control Registry healthy weight maintenance strategies and ways to optimize metabolism.  We discussed the importance of physical activity including cardiovascular and strength training in maintaining a healthier weight.    We discussed the importance of life-long vitamin supplementation and life-long  follow-up.    Russell was reminded that, to avoid marginal ulcers she should avoid tobacco at all, alcohol in excess, caffeine in excess, and NSAIDS (unless indicated for cardioprotection or othewise and opposed by a PPI).    Jinny Pierre MD, MD, FAAFP  [facility].  5/4/2023  8:08 AM      No images are attached to the encounter.  Total time spent on the date of this encounter doing: chart review, review of test results, patient visit, physical exam, education, counseling, developing plan of care, and documenting = 30 minutes.      Again, thank you for allowing me to  participate in the care of your patient.        Sincerely,        Jinny Pierre MD

## 2023-05-04 NOTE — PROGRESS NOTES
Bariatric Follow Up Visit with a History of Previous Bariatric Surgery     Date of visit: 5/4/2023  Physician: Jinny Pierre MD, MD  Primary Care Provider:  Lali Humphries   33 year old  female    Date of Surgery: 10/27/2020  Initial Weight: 266#  Initial BMI: 45.74  Today's Weight:   Wt Readings from Last 1 Encounters:   05/04/23 86.1 kg (189 lb 14.4 oz)     Body mass index is 32.6 kg/m .      Assessment and Plan     Assessment: Russell is a 33 year old year old female who is 18 mo s/p  Sleeve Gastrectomy with Dr. Rossi Gomez feels as if she has achieved the goals she hoped to accomplish through bariatric surgery and weight loss.    Encounter Diagnoses   Name Primary?     Postoperative malabsorption Yes     Obesity, Class I, BMI 30.0-34.9 (see actual BMI)          Current Outpatient Medications:      calcium carbonate (OS-BRYANNA) 500 MG tablet, Take 1 tablet by mouth 2 times daily, Disp: , Rfl:      cyanocobalamin (VITAMIN B-12) 1000 MCG SUBL sublingual tablet, Place 1,000 mcg under the tongue daily, Disp: , Rfl:      ergocalciferol (ERGOCALCIFEROL) 1.25 MG (85292 UT) capsule, Take 50,000 Units by mouth, Disp: , Rfl:      ferrous fumarate 65 mg, [place]. FE,-Vitamin C 125 mg (VITRON C)  MG TABS tablet, Take 1 tablet by mouth daily, Disp: 90 tablet, Rfl: 3     Pediatric Multi Vit-Extra C-FA (FLINTSTONES/EXTRA C) CHEW, Take 1 tablet by mouth, Disp: , Rfl:      phentermine (ADIPEX-P) 37.5 MG tablet, TAKE 1/2 TO 1 (ONE-HALF TO ONE) TABLET BY MOUTH ONCE DAILY IN THE MORNING BEFORE BREAKFAST, Disp: 90 tablet, Rfl: 1     Semaglutide-Weight Management (WEGOVY) 0.25 MG/0.5ML pen, Inject 0.25 mg Subcutaneous once a week for 28 days, Disp: 2 mL, Rfl: 0     Semaglutide-Weight Management (WEGOVY) 0.5 MG/0.5ML pen, Inject 0.5 mg Subcutaneous once a week for 28 days, Disp: 2 mL, Rfl: 0     Semaglutide-Weight Management (WEGOVY) 1 MG/0.5ML pen, Inject 1 mg Subcutaneous once a week  for 28 days, Disp: 2 mL, Rfl: 0     Vitamin D, Cholecalciferol, 25 MCG (1000 UT) TABS, , Disp: , Rfl:      Plan: Wegovy, dietitian, back to exercise. Annual labs. Continue vitamins with consistency    Return in about 3 months (around 8/4/2023).    Bariatric Surgery Review     Interim History/LifeChanges: Moved 5 days ago. Living with her children. Taking vitamins. Hair loss. Periods monthly with few heavy days    Patient Concerns: weight regain  Appetite (1-10): up  GERD: no    Medication changes: no    Vitamin Intake:   B-12   SL   MVI  2/d   Vitamin D  5,000   Calcium   citrate     Other                LABS: ordered    Nausea occ  Vomiting rare  Constipation no  Diarrhea no  Rashes no  Hair Loss yes  Calf tenderness no  Breathing difficulty no  Reactive Hypoglycemia no  Light Headedness no   Moods euthymic    12 point ROS as above and otherwise negative      Habits:  Alcohol: no  Tobacco: no  Caffeine no  NSAIDS no  Exercise Routine: no  3 meals/day yes  Protein first yes  60+grams/day  Water Separate from meals yes  Calorie Containing Beverages no  Restaurant eating/wk no  Sleeping not enough  Stress high-just moved  CPAP: no  Contraception: TL  DEXA:not indicated for age <45    Social History     Social History     Socioeconomic History     Marital status: Single     Spouse name: Not on file     Number of children: Not on file     Years of education: Not on file     Highest education level: Not on file   Occupational History     Not on file   Tobacco Use     Smoking status: Never     Smokeless tobacco: Never   Vaping Use     Vaping status: Not on file   Substance and Sexual Activity     Alcohol use: No     Drug use: No     Sexual activity: Yes     Partners: Male     Birth control/protection: Condom   Other Topics Concern     Not on file   Social History Narrative    Single, one son, Daughter born 10/2021 Working [employer]. Enjoys her job.   Son's father is in [place] and not involved  Close with her  father who lives in [place]  Not close with her mother who lives here   Miscarried twin boys. Looks after her 19 yo niece who has learning challenges and watches her kids for her while she is at work. Living with her kids now without the father of her youngest daughter.     Social Determinants of Health     Financial Resource Strain: Not on file   Food Insecurity: Not on file   Transportation Needs: Not on file   Physical Activity: Not on file   Stress: Not on file   Social Connections: Not on file   Intimate Partner Violence: Not on file   Housing Stability: Not on file       Past Medical History     Past Medical History:   Diagnosis Date     Acanthosis nigricans      Accessory skin tags      Anemia      Asthma      Bacterial vaginosis 2019    3/21/19     Cervical insufficiency during pregnancy in second trimester, antepartum 2019     Chlamydia      Depression      Dichorionic diamniotic twin pregnancy in second trimester 2019     Diet controlled gestational diabetes mellitus (GDM) in second trimester 2019     Gestational diabetes      Gonococcal infection (acute) of lower genitourinary tract     Created by Conversion      Heartburn      Herpes simplex      Hirsutism      History of  delivery, currently pregnant in first trimester 2019     Low back pain      Menorrhagia      Morbid obesity (H)       labor 2011     Single liveborn infant delivered vaginally 10/13/2021     Status post bilateral salpingectomy 10/13/2021     Problem List     Patient Active Problem List   Diagnosis     Morbid obesity (H)     Anemia     Rh negative state in antepartum period, second trimester     History of bariatric surgery     History of gestational diabetes mellitus     Status post bilateral salpingectomy     Single liveborn infant delivered vaginally     Medications       Current Outpatient Medications:      calcium carbonate (OS-BRYANNA) 500 MG tablet, Take 1 tablet by mouth 2 times  "daily, Disp: , Rfl:      cyanocobalamin (VITAMIN B-12) 1000 MCG SUBL sublingual tablet, Place 1,000 mcg under the tongue daily, Disp: , Rfl:      ergocalciferol (ERGOCALCIFEROL) 1.25 MG (55311 UT) capsule, Take 50,000 Units by mouth, Disp: , Rfl:      ferrous fumarate 65 mg, [place]. FE,-Vitamin C 125 mg (VITRON C)  MG TABS tablet, Take 1 tablet by mouth daily, Disp: 90 tablet, Rfl: 3     Pediatric Multi Vit-Extra C-FA (FLINTSTONES/EXTRA C) CHEW, Take 1 tablet by mouth, Disp: , Rfl:      phentermine (ADIPEX-P) 37.5 MG tablet, TAKE 1/2 TO 1 (ONE-HALF TO ONE) TABLET BY MOUTH ONCE DAILY IN THE MORNING BEFORE BREAKFAST, Disp: 90 tablet, Rfl: 1     Semaglutide-Weight Management (WEGOVY) 0.25 MG/0.5ML pen, Inject 0.25 mg Subcutaneous once a week for 28 days, Disp: 2 mL, Rfl: 0     Semaglutide-Weight Management (WEGOVY) 0.5 MG/0.5ML pen, Inject 0.5 mg Subcutaneous once a week for 28 days, Disp: 2 mL, Rfl: 0     Semaglutide-Weight Management (WEGOVY) 1 MG/0.5ML pen, Inject 1 mg Subcutaneous once a week for 28 days, Disp: 2 mL, Rfl: 0     Vitamin D, Cholecalciferol, 25 MCG (1000 UT) TABS, , Disp: , Rfl:    Surgical History     Past Surgical History  She has a past surgical history that includes Tonsillectomy; Pr Lap, Chanel Restrict Proc, Longitudinal Gastrectomy (N/A, 10/27/2020); and Laparotomy mini, tubal ligation (post partum), combined (Bilateral, 10/11/2021).    Objective-Exam     Constitutional:  /82 (BP Location: Right arm, Patient Position: Sitting)   Ht 1.626 m (5' 4\")   Wt 86.1 kg (189 lb 14.4 oz)   BMI 32.60 kg/m      General:  Pleasant and in no acute distress   Eyes:  EOMI  ENT:  Airway 1+  Moist mucous membranes  Neck:  Supple, No LAD, No thyromegaly, No carotid bruits appreciated  Respiratory: Normal respiratory effort, no cough, wheezes or crackles  CV:  Regular rate and Rhythm,nomurmurs, pulses 2+, no calf tenderness, no LE edema  Gastrointestinal: Abdomen NT/ND, BS+  Musculoskeletal: muscle mass " WNL  Skin: color normal hair normal, incisions nicely healed  Neurological: No tremor, normal gait  Psychiatric: alert and oriented X3, mood and affect normal    Counseling     We reviewed the important post op bariatric recommendations:  -eating 3 meals daily  -eating protein first, getting >60gm protein daily  -eating slowly, chewing food well  -avoiding/limiting calorie containing beverages  -drinking water 15-30 minutes before or after meals  -choosing wheat, not white with breads, crackers, pastas, edwina, bagels, tortillas, rice  -limiting restaurant or cafeteria eating to twice a week or less    We discussed the importance of restorative sleep and stress management in maintaining a healthy weight.  We discussed the National Weight Control Registry healthy weight maintenance strategies and ways to optimize metabolism.  We discussed the importance of physical activity including cardiovascular and strength training in maintaining a healthier weight.    We discussed the importance of life-long vitamin supplementation and life-long  follow-up.    Russell was reminded that, to avoid marginal ulcers she should avoid tobacco at all, alcohol in excess, caffeine in excess, and NSAIDS (unless indicated for cardioprotection or othewise and opposed by a PPI).    Jinny Pierre MD, MD, FAAFP  [facility].  5/4/2023  8:08 AM      No images are attached to the encounter.  Total time spent on the date of this encounter doing: chart review, review of test results, patient visit, physical exam, education, counseling, developing plan of care, and documenting = 30 minutes.

## 2023-05-05 ENCOUNTER — VIRTUAL VISIT (OUTPATIENT)
Dept: SURGERY | Facility: CLINIC | Age: 33
End: 2023-05-05
Payer: COMMERCIAL

## 2023-05-05 ENCOUNTER — TELEPHONE (OUTPATIENT)
Dept: SURGERY | Facility: CLINIC | Age: 33
End: 2023-05-05

## 2023-05-05 DIAGNOSIS — E66.9 OBESITY (BMI 30-39.9): Primary | ICD-10-CM

## 2023-05-05 DIAGNOSIS — Z98.84 S/P BARIATRIC SURGERY: ICD-10-CM

## 2023-05-05 DIAGNOSIS — Z71.3 NUTRITIONAL COUNSELING: ICD-10-CM

## 2023-05-05 LAB
DEPRECATED CALCIDIOL+CALCIFEROL SERPL-MC: 99 UG/L (ref 20–75)
ZINC SERPL-MCNC: 69 UG/DL

## 2023-05-05 PROCEDURE — 97802 MEDICAL NUTRITION INDIV IN: CPT | Mod: VID

## 2023-05-05 NOTE — PROGRESS NOTES
Russell Gomez is a 32 year old who is being evaluated via a billable video visit.      How would you like to obtain your AVS? MyChart  If the video visit is dropped, the invitation should be resent by: Send as text Will anyone else be joining your video visit? No  {If patient encounters technical issues they should call [phone]      Medical weight management with h/o bariatric surgery.     Assessment:  Pt presents for 2.5 years post-op RD visit, s/p LSG on 10/27/2020 with Dr. Richey. Today we reviewed current eating habits and level of physical activity, and instructed on the changes that are required for successful bariatric outcomes.    Patient Progress:  Patient report she currently has lack of exercise and not choosing the most nutritious foods. States she has lack of time and is snacking more often.  from her childrens father and moved out, stress is high. Has a 1 year old so is tired often. Taking Phentermine and wegovy to help with appetite suppression/cravings.    +increasing her water intake   + does not eat bread  + aims to eat whole wheat items (crackers and tortillas)     GOAL: reach 150 lbs    Initial weight: 266 lbs  Current weight: 189 lbs (last updated weight)   Weight change: 77 lbs (down)    BMI: 32.6 kg/m2    Patient Active Problem List   Diagnosis     Morbid obesity (H)     Anemia     Rh negative state in antepartum period, second trimester     History of bariatric surgery     History of gestational diabetes mellitus     Status post bilateral salpingectomy     Single liveborn infant delivered vaginally       Vitamins   Multi Vit with Iron: yes  Calcium Citrate: yes  B12: yes  D3: yes      Diet Recall/Time:   Gets to work at 5 am    Breakfast: Protein Shake (30 g), orange or 2 hard boiled eggs   Lunch: Protein Shake (30 g) or   Usually takes a nap after work due to being tired  Dinner: fast food or skipped (due to being tired) or salad or corn dog or ground turkey  "    Typical Snacks: string cheese (here and there)     Proteins/Veg/Fruits/CHO (NOT well tolerated): hashbrowns, beans, apple    Estimated protein intake: 60 grams    Meal Duration: 20 minutes    Fluid-meal separation:  Fluids are  30min before and 30 minutes after meals.    Fluid Intake  Water: all day long  Caffeine: 1 cup of coffee (on occasion, not daily)    Exercise: no set regimen-due to tired during the day and not getting good sleep      PES statement:      (NB-1.7) Undesirable food choices related to Failure to adjust for lifestyle changes as evidenced by low protein intake at meals; large portions; skipping meals; frequent grazing;  mindless eating ; drinking with meals, not chewing each bite to applesauce consistency; consuming caffeine/carbonation daily, frequent restaurant intake; and no structured activity regimen    Obesity related to excessive energy intake as evidence by grazing and snacking throughout the day, choosing high sugar and high carb food items and BMI of 32.6 kg/m2       Intervention    Discussion  1. Recommended to consume 15-20gm protein at 3 meals daily, along with protein supplement/\"planned protein containing snack\" of 15-30gm protein, to reach goal of 60-80 gm protein daily.  2. Educated on post-op vitamin regimen: Multi Vit + iron 2x/day, calcium citrate 400-600 mg 2x/day, 4967-7161 mcg of Sublingual B-12 daily, and 5000 IU Vitamin D3 daily (MVI and calcium can be taken at the same time BID)  3. Reviewed lean protein sources  4. Bariatric Plate Method-  including lean/low fat protein at each meal, including a vegetable/fruit, and limiting carbohydrate intake to less than 25% of plate volume.     Instructions  1. Include 15-20gm protein at each meal, along with protein supplement/\"planned protein containing snack\" of 15-30gm protein, to reach goal of 60-80 gm protein daily.  2. Increase fluid intake to 64oz daily: choose plain or calorie/carbonation-free " beverages.  3. Incorporate daily structured activity, 30-60 minutes most days of the week  4. Recommended pt to start taking: Multi Vit + iron 2x/day, calcium citrate 400-600 mg 2x/day, 4397-6448 mcg of Sublingual B-12 daily, and 5000 IU Vitamin D3 daily. (MVI and calcium can be taken at the same time)  5. Read food labels more consistently: keeping total fat grams <10, total sugar grams <10, fiber >3gm per serving.  6. Increase vegetable/fruit intake, by having a vegetable or fruit with each meal daily.  7. Practice plate method: 1/2 plate lean/low fat protein source, vegetable/fruit, <25% of plate complex carbohydrates.  8. Separate fluids 30 minutes before/after meal times.  9. Practice eating off of smaller plates/bowls, chewing to applesauce consistency, taking 20-30 minutes to eat in a calm/relaxed environment without distractions of tv/email/cell phone.    Handouts provided:  Quick meal list   Meal examples   Carb and protein info     Assessment/Plan:    Follow up in 4 months   -aim for 15-20 g protein at each mohsen  -pair your protein shake with a nutrient dense carb   Plan lunches ahead of time for a few days (chicken salad, egg salad ect,)    Video-Visit Details    Type of service:  Video Visit    Video Start Time (time video started): 1:59 pm    Video End Time (time video stopped): 2:28 pm    Originating Location (pt. Location): Home        Distant Location (provider location):  Off-site    Mode of Communication:  Video Conference via [facility]    Physician has received verbal consent for a Video Visit from the patient? Yes    Iman Lozoya, KARRIE

## 2023-05-05 NOTE — TELEPHONE ENCOUNTER
Prior Authorization Retail Medication Request    Medication/Dose: Wegovy Auto-injectors--titrating doses  Previously Tried and Failed:  Weight loss surgery, dietitian visits   Rationale:  2.5 yrs s/p Sleeve Gastrectomy    Key for 0.25mg/0.5ml=[id number]  Key for 0.5mg/0.5ml=[id number]  Key for 1mg/0.5ml=[id number]    Pharmacy Information (if different than what is on RX)  Name:  [facility], [facility] [place] [facility]\""  Phone:  [phone]

## 2023-05-05 NOTE — LETTER
5/5/2023         RE: Russell Gomez  [address]  [place] [address]        Dear Colleague,    Thank you for referring your patient, Russell Gomez, to the [facility] [place]. Please see a copy of my visit note below.    Russell Gomez is a 32 year old who is being evaluated via a billable video visit.      How would you like to obtain your AVS? MyChart  If the video visit is dropped, the invitation should be resent by: Send as text Will anyone else be joining your video visit? No  {If patient encounters technical issues they should call [phone]      Medical weight management with h/o bariatric surgery.     Assessment:  Pt presents for 2.5 years post-op RD visit, s/p LSG on 10/27/2020 with Dr. Richey. Today we reviewed current eating habits and level of physical activity, and instructed on the changes that are required for successful bariatric outcomes.    Patient Progress:  Patient report she currently has lack of exercise and not choosing the most nutritious foods. States she has lack of time and is snacking more often.  from her childrens father and moved out, stress is high. Has a 1 year old so is tired often. Taking Phentermine and wegovy to help with appetite suppression/cravings.    +increasing her water intake   + does not eat bread  + aims to eat whole wheat items (crackers and tortillas)     GOAL: reach 150 lbs    Initial weight: 266 lbs  Current weight: 189 lbs (last updated weight)   Weight change: 77 lbs (down)    BMI: 32.6 kg/m2    Patient Active Problem List   Diagnosis     Morbid obesity (H)     Anemia     Rh negative state in antepartum period, second trimester     History of bariatric surgery     History of gestational diabetes mellitus     Status post bilateral salpingectomy     Single liveborn infant delivered vaginally       Vitamins   Multi Vit with Iron: yes  Calcium Citrate: yes  B12: yes  D3: yes      Diet  "Recall/Time:   Gets to work at 5 am    Breakfast: Protein Shake (30 g), orange or 2 hard boiled eggs   Lunch: Protein Shake (30 g) or   Usually takes a nap after work due to being tired  Dinner: fast food or skipped (due to being tired) or salad or corn dog or ground turkey     Typical Snacks: string cheese (here and there)     Proteins/Veg/Fruits/CHO (NOT well tolerated): hashbrowns, beans, apple    Estimated protein intake: 60 grams    Meal Duration: 20 minutes    Fluid-meal separation:  Fluids are  30min before and 30 minutes after meals.    Fluid Intake  Water: all day long  Caffeine: 1 cup of coffee (on occasion, not daily)    Exercise: no set regimen-due to tired during the day and not getting good sleep      PES statement:      (NB-1.7) Undesirable food choices related to Failure to adjust for lifestyle changes as evidenced by low protein intake at meals; large portions; skipping meals; frequent grazing;  mindless eating ; drinking with meals, not chewing each bite to applesauce consistency; consuming caffeine/carbonation daily, frequent restaurant intake; and no structured activity regimen    Obesity related to excessive energy intake as evidence by grazing and snacking throughout the day, choosing high sugar and high carb food items and BMI of 32.6 kg/m2       Intervention    Discussion  1. Recommended to consume 15-20gm protein at 3 meals daily, along with protein supplement/\"planned protein containing snack\" of 15-30gm protein, to reach goal of 60-80 gm protein daily.  2. Educated on post-op vitamin regimen: Multi Vit + iron 2x/day, calcium citrate 400-600 mg 2x/day, 0488-3472 mcg of Sublingual B-12 daily, and 5000 IU Vitamin D3 daily (MVI and calcium can be taken at the same time BID)  3. Reviewed lean protein sources  4. Bariatric Plate Method-  including lean/low fat protein at each meal, including a vegetable/fruit, and limiting carbohydrate intake to less than 25% of plate volume. " "    Instructions  1. Include 15-20gm protein at each meal, along with protein supplement/\"planned protein containing snack\" of 15-30gm protein, to reach goal of 60-80 gm protein daily.  2. Increase fluid intake to 64oz daily: choose plain or calorie/carbonation-free beverages.  3. Incorporate daily structured activity, 30-60 minutes most days of the week  4. Recommended pt to start taking: Multi Vit + iron 2x/day, calcium citrate 400-600 mg 2x/day, 0524-5805 mcg of Sublingual B-12 daily, and 5000 IU Vitamin D3 daily. (MVI and calcium can be taken at the same time)  5. Read food labels more consistently: keeping total fat grams <10, total sugar grams <10, fiber >3gm per serving.  6. Increase vegetable/fruit intake, by having a vegetable or fruit with each meal daily.  7. Practice plate method: 1/2 plate lean/low fat protein source, vegetable/fruit, <25% of plate complex carbohydrates.  8. Separate fluids 30 minutes before/after meal times.  9. Practice eating off of smaller plates/bowls, chewing to applesauce consistency, taking 20-30 minutes to eat in a calm/relaxed environment without distractions of tv/email/cell phone.    Handouts provided:  Quick meal list   Meal examples   Carb and protein info     Assessment/Plan:    Follow up in 4 months   -aim for 15-20 g protein at each mohsen  -pair your protein shake with a nutrient dense carb   Plan lunches ahead of time for a few days (chicken salad, egg salad ect,)    Video-Visit Details    Type of service:  Video Visit    Video Start Time (time video started): 1:59 pm    Video End Time (time video stopped): 2:28 pm    Originating Location (pt. Location): Home        Distant Location (provider location):  Off-site    Mode of Communication:  Video Conference via [employer]    Physician has received verbal consent for a Video Visit from the patient? Yes    Iman Lozoya, RD            Again, thank you for allowing me to participate in the care of your patient.  "       Sincerely,        Keira Rodney RD

## 2023-05-08 LAB
ANNOTATION COMMENT IMP: NORMAL
RETINYL PALMITATE SERPL-MCNC: <0.02 MG/L
VIT A SERPL-MCNC: 0.46 MG/L
VIT B1 PYROPHOSHATE BLD-SCNC: 138 NMOL/L

## 2023-05-11 NOTE — TELEPHONE ENCOUNTER
Prior Authorization Approval    Authorization Effective Date: 4/11/2023  Authorization Expiration Date: 11/7/2023  Medication: Wegovy 0.25MG/0.5ML auto-injectors  Approved Dose/Quantity:   Reference #: [id number]   Insurance Company: [employer] - Phone [phone] Fax [phone]  Which Pharmacy is filling the prescription (Not needed for infusion/clinic administered): [facility] [address] [place] [address].  Pharmacy Notified: Yes  Patient Notified: Yes

## 2023-05-11 NOTE — TELEPHONE ENCOUNTER
PA Initiation    Medication: Wegovy 0.25MG/0.5ML auto-injectors  Insurance Company: [employer] - Phone [phone] Fax [phone]  Pharmacy Filling the Rx: [facility] [address] [place] [address]  Filling Pharmacy Phone: [phone]  Filling Pharmacy Fax: [phone]  Start Date: 5/11/2023

## 2023-06-08 DIAGNOSIS — E66.811 OBESITY, CLASS I, BMI 30.0-34.9 (SEE ACTUAL BMI): ICD-10-CM

## 2023-06-08 RX ORDER — SEMAGLUTIDE 0.5 MG/.5ML
0.5 INJECTION, SOLUTION SUBCUTANEOUS WEEKLY
Qty: 2 ML | Refills: 0 | Status: SHIPPED | OUTPATIENT
Start: 2023-06-08 | End: 2024-05-03

## 2023-07-27 ENCOUNTER — NURSE TRIAGE (OUTPATIENT)
Dept: NURSING | Facility: CLINIC | Age: 33
End: 2023-07-27
Payer: COMMERCIAL

## 2023-07-27 NOTE — TELEPHONE ENCOUNTER
Nurse Triage SBAR    Situation:   Bee sting     Background:   -mom and calling, It is okay to leave a detailed message at this number.     Assessment:   -sing on knee (within the past hour)  -knee is swollen  -no airway involvement and no systemic reactions   -pain going up the leg    Recommendation:   Home Care   -call back with any questions or concerns  -go to /[facility] if needed or if you would like to be evaluated    ESPINOZA MCGOWAN RN on 7/27/2023 at 12:18 P     Reason for Disposition   Normal local reaction to bee, wasp, or yellow jacket sting    Additional Information   Negative: Not a bee, wasp, hornet, or yellow jacket sting   Negative: Passed out (i.e., fainted, collapsed and was not responding)   Negative: Sounds like a life-threatening emergency to the triager   Negative: Wheezing or difficulty breathing   Negative: Hoarseness, cough, or tightness in the throat or chest   Negative: Swollen tongue or difficulty swallowing   Negative: Life-threatening reaction in past to sting (anaphylaxis) and < 2 hours since sting   Negative: Hives, itching, or swelling elsewhere on body (i.e., not at a site of sting) and started within 2 hours of sting   Negative: Patient sounds very sick or weak to the triager   Negative: Vomiting or abdominal cramps and started within 2 hours of sting   Negative: Gave epinephrine shot and no symptoms now   Negative: Sting inside the mouth   Negative: Sting on eyeball (e.g., cornea)   Negative: More than 50 stings   Negative: Fever and red area   Negative: Fever and area is very tender to touch   Negative: Red streak or red line and length > 2 inches (5 cm)   Negative: Red or very tender (to touch) area, getting larger over 48 hours after the sting   Negative: Red or very tender (to touch) area, and started over 24 hours after the sting   Negative: Swelling is huge (e.g., more than 4 inches or 10 cm, spreads beyond wrist or ankle)   Negative: Patient wants to be seen   Negative: Hives,  itching, or swelling elsewhere on body (i.e., not at a site of sting) and started > 2 hours after sting   Negative: Scab drains pus or increases in size, and not improved after applying antibiotic ointment for 2 days    Protocols used: Bee or Yellow Jacket Sting-A-OH

## 2023-08-10 ENCOUNTER — LAB REQUISITION (OUTPATIENT)
Dept: LAB | Facility: CLINIC | Age: 33
End: 2023-08-10

## 2023-08-10 ENCOUNTER — LAB REQUISITION (OUTPATIENT)
Dept: LAB | Facility: CLINIC | Age: 33
End: 2023-08-10
Payer: COMMERCIAL

## 2023-08-10 DIAGNOSIS — Z11.3 ENCOUNTER FOR SCREENING FOR INFECTIONS WITH A PREDOMINANTLY SEXUAL MODE OF TRANSMISSION: ICD-10-CM

## 2023-08-10 PROCEDURE — 87389 HIV-1 AG W/HIV-1&-2 AB AG IA: CPT | Performed by: OBSTETRICS & GYNECOLOGY

## 2023-08-10 PROCEDURE — 86803 HEPATITIS C AB TEST: CPT | Performed by: OBSTETRICS & GYNECOLOGY

## 2023-08-10 PROCEDURE — 87591 N.GONORRHOEAE DNA AMP PROB: CPT | Performed by: OBSTETRICS & GYNECOLOGY

## 2023-08-10 PROCEDURE — 86780 TREPONEMA PALLIDUM: CPT | Performed by: OBSTETRICS & GYNECOLOGY

## 2023-08-10 PROCEDURE — 87491 CHLMYD TRACH DNA AMP PROBE: CPT | Performed by: OBSTETRICS & GYNECOLOGY

## 2023-08-10 PROCEDURE — 87340 HEPATITIS B SURFACE AG IA: CPT | Performed by: OBSTETRICS & GYNECOLOGY

## 2023-08-11 LAB
C TRACH DNA SPEC QL PROBE+SIG AMP: NEGATIVE
HBV SURFACE AG SERPL QL IA: NONREACTIVE
HCV AB SERPL QL IA: NONREACTIVE
HIV 1+2 AB+HIV1 P24 AG SERPL QL IA: NONREACTIVE
N GONORRHOEA DNA SPEC QL NAA+PROBE: NEGATIVE
T PALLIDUM AB SER QL: NONREACTIVE

## 2023-08-25 DIAGNOSIS — E66.3 OVERWEIGHT: ICD-10-CM

## 2023-08-25 RX ORDER — PHENTERMINE HYDROCHLORIDE 37.5 MG/1
TABLET ORAL
Qty: 90 TABLET | Refills: 1 | Status: SHIPPED | OUTPATIENT
Start: 2023-08-25 | End: 2023-08-28

## 2023-08-28 DIAGNOSIS — E66.3 OVERWEIGHT: ICD-10-CM

## 2023-08-28 RX ORDER — PHENTERMINE HYDROCHLORIDE 37.5 MG/1
TABLET ORAL
Qty: 90 TABLET | Refills: 1 | Status: SHIPPED | OUTPATIENT
Start: 2023-08-28 | End: 2024-04-04

## 2023-12-14 ENCOUNTER — VIRTUAL VISIT (OUTPATIENT)
Dept: SURGERY | Facility: CLINIC | Age: 33
End: 2023-12-14
Payer: COMMERCIAL

## 2023-12-14 DIAGNOSIS — Z98.84 S/P BARIATRIC SURGERY: ICD-10-CM

## 2023-12-14 DIAGNOSIS — E66.9 OBESITY (BMI 30-39.9): Primary | ICD-10-CM

## 2023-12-14 DIAGNOSIS — Z71.3 NUTRITIONAL COUNSELING: ICD-10-CM

## 2023-12-14 PROCEDURE — 97803 MED NUTRITION INDIV SUBSEQ: CPT | Mod: VID

## 2023-12-14 NOTE — PROGRESS NOTES
"Russell Gomez is a 33 year old who is being evaluated via a billable video visit.      How would you like to obtain your AVS? MyChart  If the video visit is dropped, the invitation should be resent by: Send as text Will anyone else be joining your video visit? No  {If patient encounters technical issues they should call [phone]      Medical weight management with h/o bariatric surgery.     Assessment:  Pt presents for  3 years  post-op RD visit, s/p LSG on 10/27/2020 with Dr. Richey. Today we reviewed current eating habits and level of physical activity, and instructed on the changes that are required for successful bariatric outcomes.    Patient Progress: It has been ~ 7 months since last visit with RD. Patient is taking phentermine and wegovy for weight management as stress was high this past summer as she was  from her childrens father and moving into a new place. Patient reports her stressors are high d/t family. Noted she is \"on the go\" often as is relying on grab and go items.   - is following a fitness influencer on [employer]  - tracking her calories via [employer]) 1700 kcal daily   - no longer binge eating at night     GOAL: reach 150 lbs    Follow up in 4 months   -aim for 15-20 g protein at each mohsen  -pair your protein shake with a nutrient dense carb   Plan lunches ahead of time for a few days (chicken salad, egg salad ect,)    Initial weight: 266 lbs  Current weight: 203 lbs (last updated weight)   Weight change: 63 lbs (down), increased weight from last visit     BMI: 34.9 kg/m2    Patient Active Problem List   Diagnosis    Morbid obesity (H)    Anemia    Rh negative state in antepartum period, second trimester    History of bariatric surgery    History of gestational diabetes mellitus    Status post bilateral salpingectomy    Single liveborn infant delivered vaginally       Vitamins   Multi Vit with Iron: yes  Calcium Citrate: yes  B12: yes  D3: yes      Diet Recall/Time: " "  Gets to work at 5 am    Breakfast: Protein Shake (30 g), orange or 2 hard boiled eggs   Lunch: Protein Shake (30 g) or protein waffles Or egg bites Or oatmeal with protein powder and barriers.  Dinner: preparing meals at home     Typical Snacks: string cheese (here and there)     Proteins/Veg/Fruits/CHO (NOT well tolerated): hashbrowns, beans, apple    Estimated protein intake: 60-80 grams    Meal Duration: 20 minutes    Fluid-meal separation:  Fluids are  30min before and 30 minutes after meals.    Fluid Intake  Water: all day long some with crystal light   Caffeine: 1 cup of coffee (on occasion, not daily)    Exercise: no exercise routine       PES statement:      (NB-1.7) Undesirable food choices related to Failure to adjust for lifestyle changes as evidenced by low protein intake at meals; large portions; skipping meals; frequent grazing;  mindless eating ; drinking with meals, not chewing each bite to applesauce consistency; consuming caffeine/carbonation daily, frequent restaurant intake; and no structured activity regimen    Obesity related to excessive energy intake as evidence by grazing and snacking throughout the day, choosing high sugar and high carb food items and BMI of 34.9 kg/m2       Intervention    Discussion  Recommended to consume 15-20gm protein at 3 meals daily, along with protein supplement/\"planned protein containing snack\" of 15-30gm protein, to reach goal of 60-80 gm protein daily.  Educated on post-op vitamin regimen: Multi Vit + iron 2x/day, calcium citrate 400-600 mg 2x/day, 4486-0374 mcg of Sublingual B-12 daily, and 5000 IU Vitamin D3 daily (MVI and calcium can be taken at the same time BID)  Reviewed lean protein sources  Bariatric Plate Method-  including lean/low fat protein at each meal, including a vegetable/fruit, and limiting carbohydrate intake to less than 25% of plate volume.     Instructions  Include 15-20gm protein at each meal, along with protein " "supplement/\"planned protein containing snack\" of 15-30gm protein, to reach goal of 60-80 gm protein daily.  Increase fluid intake to 64oz daily: choose plain or calorie/carbonation-free beverages.  Incorporate daily structured activity, 30-60 minutes most days of the week  Recommended pt to start taking: Multi Vit + iron 2x/day, calcium citrate 400-600 mg 2x/day, 8103-7486 mcg of Sublingual B-12 daily, and 5000 IU Vitamin D3 daily. (MVI and calcium can be taken at the same time)  Read food labels more consistently: keeping total fat grams <10, total sugar grams <10, fiber >3gm per serving.  Increase vegetable/fruit intake, by having a vegetable or fruit with each meal daily.  Practice plate method: 1/2 plate lean/low fat protein source, vegetable/fruit, <25% of plate complex carbohydrates.  Separate fluids 30 minutes before/after meal times.  Practice eating off of smaller plates/bowls, chewing to applesauce consistency, taking 20-30 minutes to eat in a calm/relaxed environment without distractions of tv/email/cell phone.    Handouts provided:  Meal ideas    Assessment/Plan:    Follow up with KARRIE PRN.     Video-Visit Details    Type of service:  Video Visit    Video Start Time (time video started):  3:31 pm    Video End Time (time video stopped): 3:52 pm    Originating Location (pt. Location): Home        Distant Location (provider location):  Off-site    Mode of Communication:  Video Conference via [facility]    Physician has received verbal consent for a Video Visit from the patient? Yes    Iman Lozoya, KARRIE        "

## 2023-12-14 NOTE — PATIENT INSTRUCTIONS
Nutritionbykylie     On-the-Go Breakfast Ideas  As of 2015, the latest research shows what a huge impact eating breakfast has on losing weight and feeling your best. People lose more weight when they make breakfast their biggest meal of the day compared to Dinner, but even if you cannot go to that degree, getting a breakfast that has at least 20 grams of protein and even a moderate amount of fat is ideal for maintaining good energy through the day and limits overeating in the evening hours.  The following are some quick and easy suggestions for at least getting something of substance into your body in the morning.  Enjoy!    Eating breakfast within 90 minutes of waking up is an important part of taking care of your body on a restricted calorie diet plan.  After sleeping for hours, your body is in need of fuel.  An ideal breakfast is a combination of protein, whole-grain carbohydrates, or fruit.  Here s why:    -Protein digests very slowly in the body, helping you feel more satisfied.  -Whole grains provide dietary fiber, which also digests slowly and helps keep your gut clean.  -Fruit is a great source of vitamins, minerals, and fiber.     Each one of these breakfast combinations has between 200-300 calories and 15-20 grams of protein.  Feel free to mix and match!    Bone Broth (chicken bone broth or beef bone broth) is a great way to boost protein content. 8oz of bone broth will typically have 9-12grams of protein for 40kcal of energy.    Protein: Choose  -1/2 cup low-fat cottage cheese  -2 hard boiled eggs , or one cooked in olive oil (low/slow heat).  -1 low fat string cheese stick  -1 Tablespoon natural peanut butter  -[employer]unde [employer] vegetarian sausage johanne (found in freezer section)  -1 slice lowfat cheese  -6 oz 2% or lowfat Greek yogurt, such as Fage or Oikos.    PLUS    Whole Grains:  Choose   -1 whole wheat English muffin  -1 whole wheat edwina, half  -1/2 Fiber One frozen muffin, thawed  -1/2 Fiber One  toaster pastry  -1 whole wheat bagel thin  -1/2 cup Kashi cereal  -1 Kashi waffle (or other whole grain high-fiber waffle)  Aim for whole grain/sprouted breads with at least 3g of fiber/slice if having bread. [place] is one such brand.    OR    Fruit: Choose  -1/3 cup blueberries  -1/2 banana (or a plantain- similar to a banana, yet smaller)  -1/2 cup cantaloupe cubes  -1 small apple  -1 small orange  -1/2 cup strawberries  -handful raspberries/blackberries (each berry is about 1 calorie).    *Adapted from [employer], Fall 20    Ten Breakfasts Under 250 calories    Ideally, getting between 350-600 calories  (depending on starting height and weight)for breakfast is ideal for avoiding hunger later in the day, adjust/add to the following accordingly:    One- 250 calories, 8.5 g protein  1 slice whole-grain toast   1 Tbsp peanut butter    banana    Two- 250 calories, 8 g protein    cup nonfat/lowfat yogurt  1/3rd cup diced no-sugar peaches  1/3rd cup cereal (like Special K, Cheerios, or bran flakes)    Three- 250 calories, 25 g protein  1 egg scrambled with 1 oz skim milk    cup shredded cheddar    whole grain English muffin  1 oz Georgian keen  1 tsp margarine spread    Four- 225 calories, 25 g protein  1/2 cup Kashi Go-Lean cereal    cup skim milk mixed with 1 scoop Bariatric Advantage protein powder    cup no-sugar diced pears    Five- 250 calories, 20 g protein    cup oatmeal prepared with skim milk, 1 scoop protein powder, and sugar-free maple syrup    Six- 200 calories, 5 g protein  1 whole grain waffle, toasted  1 tablespoon creamy peanut or almond butter    Seven-  250 calories, 19 g protein  Breakfast sandwich: 1 slice whole grain toast, cut in half.  Add 1 scrambled egg and one slice cheddar  cheese.    Eight-  250 calories, 15 g protein  2 eggs scrambled with 1/3 cup frozen spinach (heat before adding to eggs) and 2 tablespoons low fat cream cheese.    Nine-  150 calories, 15 g protein  2/3rd cup  cottage cheese    cup cantaloupe    Ten- 200 calories, 20 g protein  Fruit smoothie made with 4 oz. nonfat Greek yogurt,   cup berries, 1 scoop protein powder, and 4 oz skim milk.    Ten Lunches Under 250 Calories    Aim for lunch to be around 300-400 calories a day when trying to lose weight and get that protein in!    One- 200 calories, 11 g protein  1/3 cup tuna salad made with light ghosh on 1 slice whole grain bread  1 small peeled apple    Two- 250 calories, 16 g protein  1/3 cup lowfat cottage cheese    cup cooked green beans    small fruit cocktail (in natural juice)    Three- 200 calories, 11 g protein    grilled cheese sandwich on whole grain bread with lowfat cheese  2/3rd cup of tomato soup    Four- 250 calories, 22 g protein  Deli wrap: 1 oz sliced turkey, 1 oz sliced ham, 1 oz sliced chicken rolled up with 1 slice low-fat cheese  1 small orange    Five- 250 calories, 28 g protein  2/3rd cup chili with 1 oz shredded cheese  4 saltine crackers    Six- 250 calories, 22 g protein  1 cup fresh spinach with 2 oz chicken, 1/3rd cup mandarin oranges, and 2 tablespoons sliced almonds with 1 tablespoon  vinaigrette dressing    Seven- 200 calories, 11 g protein  1 Tbsp sugar-free preserves and 1 Tbsp peanut butter on 1 slice whole grain toast    cup nonfat/lowfat Greek yogurt    Eight- 250 calories, 18 g protein  1 small soft-shell chicken taco with 1 oz shredded cheese, lettuce, tomato, salsa, and 1 Tbsp light sour cream    cup black beans    Nine- 225 calories, 13 g protein  2 ounces baked chicken  1/4 cup mashed potatoes    cup green beans    Ten- 200 calories, 21 g protein  Deli edwina: 2 oz roast beef or other deli meat with 1 tsp Terence mayonnaise and sliced tomato, onion, and lettuce  1/3rd cup cottage cheese      Ten Dinners Under 300 calories    If you're eating a large breakfast and medium lunch, keep dinner small.  300-400 calories is ideal for most people depending on their caloric needs.    One- 300  calories, 12 g protein  1-inch thick slice of turkey meatloaf    cup baked butternut squash    Two- 200 calories, 9 g protein  Bread-less BLT: 3 slices turkey keen, sliced tomato, wrapped in a large lettuce leaf    cup peeled fruit    Three- 275 calories, 36 g protein  3 oz roasted chicken    cup cooked broccoli    cup shredded cheddar cheese    cup unsweetened applesauce    Four- 200 calories, 25 g protein  3 oz baked tilapia  1/3rd cup cooked carrots    cup yogurt    Five- 250 calories, 20 g protein  Grilled ham  n  Swiss: spread 2 tsp ghee or butter on 1 slice of whole grain bread.  Cut bread in half, layer 2 oz deli ham with 1 piece of Swiss cheese and grill until cheese is melted.    cup cooked vegetables    Six- 250 calories, 18 g protein  Vegetarian cheeseburger: 1 Boca cheeseburger topped with lettuce, onion, tomato, and ketchup/mustard    cup sweet potato fries    Seven- 250 calories, 18 g protein  Pork pot roast: 2 oz roasted pork loin, 1/3rd cup roasted carrots,   medium potato, cooked with   cup gravy    Eight- 330 calories, 25 g protein  2 oz meatballs (about 2 small meatballs)    cup spaghetti sauce  1/2 piece toast topped with 1 tsp ghee or butterand topped with garlic powder, toasted in oven    Nine- 250 calories, 16 g protein  Mexican pizza: one 8  corn tortilla topped with 2 oz chicken,   cup salsa, 2 tablespoons black beans, 2 tablespoons shredded cheese.  Bake until cheese is melted.    Ten- 250 calories, 22 g protein  Shrimp stir-meyer: 3 oz cooked shrimp, 1/6th onion,   pepper,   cup chopped carrots sautéed in 1 tablespoon olive oil, topped with 2 tablespoons stir meyer sauce and a pinch of sesame seeds        150 Calories or Less Snack Ideas   1 hardboiled egg with   cup berries  1 small apple with 1 hardboiled egg  10 almonds with   cup berries  2 clementines with 1 light string cheese  1 light string cheese with   sliced apple  1 light string cheese wrapped in 2 slices of turkey  4 100% whole  wheat crackers (e.g. Triscuit) with 1 light string cheese    c. cottage cheese with   cup fruit and 1 Tbsp sunflower seeds     cup cottage cheese with   of an avocado     can tuna fish with 1 cup sliced cucumbers     cup roasted garbanzo beans with paprika and cayenne pepper    baked sweet potato with   cup chili beans or   cup cottage cheese  2 oz. nitrate free turkey slices with 1 cup carrots  1 container (6 oz) of low sugar (less than 10 grams of sugar) greek yogurt   3 Tablespoons of hummus with 1 cup sliced bell peppers   2 Tablespoons of hummus with 15 baby carrots  4 Tablespoons ranch dip made with plain Greek Yogurt and 3 mini cucumbers  1/4 cup nuts (any kind)  1 Tablespoon peanut butter with 1 stalk celery   1 dill pickle wrapped in 1-2 slices of deli ham with 1 tsp of mayonnaise/mustard.

## 2023-12-14 NOTE — LETTER
"    12/14/2023         RE: Russell Gomez  [address]  [place] [address]        Dear Colleague,    Thank you for referring your patient, Russell Gomez, to the [facility] [place]. Please see a copy of my visit note below.    Russell Gomez is a 33 year old who is being evaluated via a billable video visit.      How would you like to obtain your AVS? MyChart  If the video visit is dropped, the invitation should be resent by: Send as text Will anyone else be joining your video visit? No  {If patient encounters technical issues they should call [phone]      Medical weight management with h/o bariatric surgery.     Assessment:  Pt presents for  3 years  post-op RD visit, s/p LSG on 10/27/2020 with Dr. Richey. Today we reviewed current eating habits and level of physical activity, and instructed on the changes that are required for successful bariatric outcomes.    Patient Progress: It has been ~ 7 months since last visit with RD. Patient is taking phentermine and wegovy for weight management as stress was high this past summer as she was  from her childrens father and moving into a new place. Patient reports her stressors are high d/t family. Noted she is \"on the go\" often as is relying on grab and go items.   - is following a fitness influencer on tik tok  - tracking her calories via bhumi ([employer]) 1700 kcal daily   - no longer binge eating at night     GOAL: reach 150 lbs    Follow up in 4 months   -aim for 15-20 g protein at each mohsen  -pair your protein shake with a nutrient dense carb   Plan lunches ahead of time for a few days (chicken salad, egg salad ect,)    Initial weight: 266 lbs  Current weight: 203 lbs (last updated weight)   Weight change: 63 lbs (down), increased weight from last visit     BMI: 34.9 kg/m2    Patient Active Problem List   Diagnosis     Morbid obesity (H)     Anemia     Rh negative state in antepartum period, " "second trimester     History of bariatric surgery     History of gestational diabetes mellitus     Status post bilateral salpingectomy     Single liveborn infant delivered vaginally       Vitamins   Multi Vit with Iron: yes  Calcium Citrate: yes  B12: yes  D3: yes      Diet Recall/Time:   Gets to work at 5 am    Breakfast: Protein Shake (30 g), orange or 2 hard boiled eggs   Lunch: Protein Shake (30 g) or protein waffles Or egg bites Or oatmeal with protein powder and barriers.  Dinner: preparing meals at home     Typical Snacks: string cheese (here and there)     Proteins/Veg/Fruits/CHO (NOT well tolerated): hashbrowns, beans, apple    Estimated protein intake: 60-80 grams    Meal Duration: 20 minutes    Fluid-meal separation:  Fluids are  30min before and 30 minutes after meals.    Fluid Intake  Water: all day long some with crystal light   Caffeine: 1 cup of coffee (on occasion, not daily)    Exercise: no exercise routine       PES statement:      (NB-1.7) Undesirable food choices related to Failure to adjust for lifestyle changes as evidenced by low protein intake at meals; large portions; skipping meals; frequent grazing;  mindless eating ; drinking with meals, not chewing each bite to applesauce consistency; consuming caffeine/carbonation daily, frequent restaurant intake; and no structured activity regimen    Obesity related to excessive energy intake as evidence by grazing and snacking throughout the day, choosing high sugar and high carb food items and BMI of 34.9 kg/m2       Intervention    Discussion  Recommended to consume 15-20gm protein at 3 meals daily, along with protein supplement/\"planned protein containing snack\" of 15-30gm protein, to reach goal of 60-80 gm protein daily.  Educated on post-op vitamin regimen: Multi Vit + iron 2x/day, calcium citrate 400-600 mg 2x/day, 5050-2522 mcg of Sublingual B-12 daily, and 5000 IU Vitamin D3 daily (MVI and calcium can be taken at the same time " "BID)  Reviewed lean protein sources  Bariatric Plate Method-  including lean/low fat protein at each meal, including a vegetable/fruit, and limiting carbohydrate intake to less than 25% of plate volume.     Instructions  Include 15-20gm protein at each meal, along with protein supplement/\"planned protein containing snack\" of 15-30gm protein, to reach goal of 60-80 gm protein daily.  Increase fluid intake to 64oz daily: choose plain or calorie/carbonation-free beverages.  Incorporate daily structured activity, 30-60 minutes most days of the week  Recommended pt to start taking: Multi Vit + iron 2x/day, calcium citrate 400-600 mg 2x/day, 6457-8654 mcg of Sublingual B-12 daily, and 5000 IU Vitamin D3 daily. (MVI and calcium can be taken at the same time)  Read food labels more consistently: keeping total fat grams <10, total sugar grams <10, fiber >3gm per serving.  Increase vegetable/fruit intake, by having a vegetable or fruit with each meal daily.  Practice plate method: 1/2 plate lean/low fat protein source, vegetable/fruit, <25% of plate complex carbohydrates.  Separate fluids 30 minutes before/after meal times.  Practice eating off of smaller plates/bowls, chewing to applesauce consistency, taking 20-30 minutes to eat in a calm/relaxed environment without distractions of tv/email/cell phone.    Handouts provided:  Meal ideas    Assessment/Plan:    Follow up with KARRIE GILMAN.     Video-Visit Details    Type of service:  Video Visit    Video Start Time (time video started):  3:31 pm    Video End Time (time video stopped): 3:52 pm    Originating Location (pt. Location): Home        Distant Location (provider location):  Off-site    Mode of Communication:  Video Conference via [facility]    Physician has received verbal consent for a Video Visit from the patient? Yes    Iman Lozoya, KARRIE          Again, thank you for allowing me to participate in the care of your patient.        Sincerely,        Keira Rodney, " RD

## 2024-04-04 ENCOUNTER — MYC MEDICAL ADVICE (OUTPATIENT)
Dept: SURGERY | Facility: CLINIC | Age: 34
End: 2024-04-04
Payer: COMMERCIAL

## 2024-04-04 DIAGNOSIS — Z98.84 S/P BARIATRIC SURGERY: Primary | ICD-10-CM

## 2024-04-04 DIAGNOSIS — E66.3 OVERWEIGHT: ICD-10-CM

## 2024-04-04 DIAGNOSIS — K91.2 POSTOPERATIVE MALABSORPTION: ICD-10-CM

## 2024-04-04 DIAGNOSIS — K91.2 POSTGASTRECTOMY MALABSORPTION: ICD-10-CM

## 2024-04-04 DIAGNOSIS — Z90.3 POSTGASTRECTOMY MALABSORPTION: ICD-10-CM

## 2024-04-04 RX ORDER — PHENTERMINE HYDROCHLORIDE 37.5 MG/1
TABLET ORAL
Qty: 90 TABLET | Refills: 1 | Status: SHIPPED | OUTPATIENT
Start: 2024-04-04 | End: 2024-05-03

## 2024-04-04 NOTE — TELEPHONE ENCOUNTER
4 yrs post op lab orders placed for patient in preparation for appointment with  in May.    Arti Brown RN, CBN  [facility]  P [phone]  F [phone]

## 2024-04-05 ENCOUNTER — TELEPHONE (OUTPATIENT)
Dept: SURGERY | Facility: CLINIC | Age: 34
End: 2024-04-05
Payer: COMMERCIAL

## 2024-04-05 NOTE — TELEPHONE ENCOUNTER
Prior Authorization Retail Medication Request    Medication/Dose: Wegovy 0.5mg/0.5ml Auto-injectors  New/renewal/insurance change PA/secondary ins. PA:  Previously Tried and Failed:  Diet, exercise, phentermine     Key: [id number]    Pharmacy Information (if different than what is on RX)  Name:  [facility]  Phone:  [phone]  Fax:  [phone]

## 2024-04-11 ENCOUNTER — MYC MEDICAL ADVICE (OUTPATIENT)
Dept: SURGERY | Facility: CLINIC | Age: 34
End: 2024-04-11
Payer: COMMERCIAL

## 2024-04-18 NOTE — TELEPHONE ENCOUNTER
Is This A New Presentation, Or A Follow-Up?: Skin Lesions PA Initiation    Medication: WEGOVY 0.5 MG/0.5ML SC SOAJ  Insurance Company: [employer] - Phone [phone] Fax [phone]  Pharmacy Filling the Rx: [facility] [address] [place] [address]  Filling Pharmacy Phone: [phone]  Filling Pharmacy Fax:    Start Date: 4/18/2024       How Severe Is Your Skin Lesion?: mild Have Your Skin Lesions Been Treated?: not been treated TOKANGELICA:'[id number]'

## 2024-04-19 DIAGNOSIS — E66.811 OBESITY, CLASS I, BMI 30.0-34.9 (SEE ACTUAL BMI): Primary | ICD-10-CM

## 2024-04-19 NOTE — TELEPHONE ENCOUNTER
Prior Authorization Approval    Medication: WEGOVY 0.5 MG/0.5ML SC SOAJ  Authorization Effective Date: 4/19/2024  Authorization Expiration Date: 4/19/2025  Insurance Company: [employer] - Phone [phone] Fax [phone]  Which Pharmacy is filling the prescription: [facility] [address] [place] [address].  Pharmacy Notified: YES    Patient Notified: Instructed pharmacy to notify patient once order is ready.

## 2024-04-23 ENCOUNTER — LAB (OUTPATIENT)
Dept: LAB | Facility: CLINIC | Age: 34
End: 2024-04-23
Payer: COMMERCIAL

## 2024-04-23 DIAGNOSIS — K91.2 POSTGASTRECTOMY MALABSORPTION: ICD-10-CM

## 2024-04-23 DIAGNOSIS — Z98.84 S/P BARIATRIC SURGERY: ICD-10-CM

## 2024-04-23 DIAGNOSIS — Z90.3 POSTGASTRECTOMY MALABSORPTION: ICD-10-CM

## 2024-04-23 DIAGNOSIS — K91.2 POSTOPERATIVE MALABSORPTION: ICD-10-CM

## 2024-04-23 LAB
ALBUMIN SERPL BCG-MCNC: 4.3 G/DL (ref 3.5–5.2)
ALP SERPL-CCNC: 61 U/L (ref 40–150)
ALT SERPL W P-5'-P-CCNC: 17 U/L (ref 0–50)
ANION GAP SERPL CALCULATED.3IONS-SCNC: 9 MMOL/L (ref 7–15)
AST SERPL W P-5'-P-CCNC: 24 U/L (ref 0–45)
BILIRUB SERPL-MCNC: 0.3 MG/DL
BUN SERPL-MCNC: 13.2 MG/DL (ref 6–20)
CALCIUM SERPL-MCNC: 9.3 MG/DL (ref 8.6–10)
CHLORIDE SERPL-SCNC: 104 MMOL/L (ref 98–107)
CHOLEST SERPL-MCNC: 134 MG/DL
CREAT SERPL-MCNC: 0.78 MG/DL (ref 0.51–0.95)
DEPRECATED HCO3 PLAS-SCNC: 24 MMOL/L (ref 22–29)
EGFRCR SERPLBLD CKD-EPI 2021: >90 ML/MIN/1.73M2
ERYTHROCYTE [DISTWIDTH] IN BLOOD BY AUTOMATED COUNT: 14.4 % (ref 10–15)
FASTING STATUS PATIENT QL REPORTED: NO
FERRITIN SERPL-MCNC: 19 NG/ML (ref 6–175)
FOLATE SERPL-MCNC: 29.3 NG/ML (ref 4.6–34.8)
GLUCOSE SERPL-MCNC: 93 MG/DL (ref 70–99)
HBA1C MFR BLD: 5.2 % (ref 0–5.6)
HCT VFR BLD AUTO: 38.3 % (ref 35–47)
HDLC SERPL-MCNC: 73 MG/DL
HGB BLD-MCNC: 12.1 G/DL (ref 11.7–15.7)
LDLC SERPL CALC-MCNC: 55 MG/DL
MCH RBC QN AUTO: 23.6 PG (ref 26.5–33)
MCHC RBC AUTO-ENTMCNC: 31.6 G/DL (ref 31.5–36.5)
MCV RBC AUTO: 75 FL (ref 78–100)
NONHDLC SERPL-MCNC: 61 MG/DL
PLATELET # BLD AUTO: 219 10E3/UL (ref 150–450)
POTASSIUM SERPL-SCNC: 4.6 MMOL/L (ref 3.4–5.3)
PROT SERPL-MCNC: 7.3 G/DL (ref 6.4–8.3)
PTH-INTACT SERPL-MCNC: 23 PG/ML (ref 15–65)
RBC # BLD AUTO: 5.12 10E6/UL (ref 3.8–5.2)
SODIUM SERPL-SCNC: 137 MMOL/L (ref 135–145)
TRIGL SERPL-MCNC: 29 MG/DL
VIT B12 SERPL-MCNC: 1877 PG/ML (ref 232–1245)
VIT D+METAB SERPL-MCNC: 76 NG/ML (ref 20–50)
WBC # BLD AUTO: 6.6 10E3/UL (ref 4–11)

## 2024-04-23 PROCEDURE — 82746 ASSAY OF FOLIC ACID SERUM: CPT

## 2024-04-23 PROCEDURE — 36415 COLL VENOUS BLD VENIPUNCTURE: CPT

## 2024-04-23 PROCEDURE — 84590 ASSAY OF VITAMIN A: CPT | Mod: 90

## 2024-04-23 PROCEDURE — 84425 ASSAY OF VITAMIN B-1: CPT | Mod: 90

## 2024-04-23 PROCEDURE — 83036 HEMOGLOBIN GLYCOSYLATED A1C: CPT

## 2024-04-23 PROCEDURE — 84630 ASSAY OF ZINC: CPT | Mod: 90

## 2024-04-23 PROCEDURE — 82728 ASSAY OF FERRITIN: CPT

## 2024-04-23 PROCEDURE — 99000 SPECIMEN HANDLING OFFICE-LAB: CPT

## 2024-04-23 PROCEDURE — 83970 ASSAY OF PARATHORMONE: CPT

## 2024-04-23 PROCEDURE — 82607 VITAMIN B-12: CPT

## 2024-04-23 PROCEDURE — 80053 COMPREHEN METABOLIC PANEL: CPT

## 2024-04-23 PROCEDURE — 80061 LIPID PANEL: CPT

## 2024-04-23 PROCEDURE — 85027 COMPLETE CBC AUTOMATED: CPT

## 2024-04-23 PROCEDURE — 82306 VITAMIN D 25 HYDROXY: CPT

## 2024-04-24 LAB — ZINC SERPL-MCNC: 84 UG/DL

## 2024-04-26 LAB
ANNOTATION COMMENT IMP: NORMAL
RETINYL PALMITATE SERPL-MCNC: <0.02 MG/L
VIT A SERPL-MCNC: 0.63 MG/L

## 2024-04-30 LAB — VIT B1 PYROPHOSHATE BLD-SCNC: 134 NMOL/L

## 2024-05-03 ENCOUNTER — VIRTUAL VISIT (OUTPATIENT)
Dept: SURGERY | Facility: CLINIC | Age: 34
End: 2024-05-03
Payer: COMMERCIAL

## 2024-05-03 VITALS — BODY MASS INDEX: 35.19 KG/M2 | WEIGHT: 205 LBS

## 2024-05-03 DIAGNOSIS — E66.811 OBESITY, CLASS I, BMI 30.0-34.9 (SEE ACTUAL BMI): ICD-10-CM

## 2024-05-03 DIAGNOSIS — E66.3 OVERWEIGHT: ICD-10-CM

## 2024-05-03 DIAGNOSIS — E66.9 OBESITY (BMI 30-39.9): Primary | ICD-10-CM

## 2024-05-03 PROCEDURE — 99214 OFFICE O/P EST MOD 30 MIN: CPT | Mod: 95 | Performed by: FAMILY MEDICINE

## 2024-05-03 RX ORDER — SEMAGLUTIDE 0.5 MG/.5ML
0.5 INJECTION, SOLUTION SUBCUTANEOUS WEEKLY
Qty: 2 ML | Refills: 0 | Status: SHIPPED | OUTPATIENT
Start: 2024-05-03 | End: 2024-07-30 | Stop reason: DRUGHIGH

## 2024-05-03 RX ORDER — PHENTERMINE HYDROCHLORIDE 37.5 MG/1
TABLET ORAL
Qty: 90 TABLET | Refills: 1 | Status: SHIPPED | OUTPATIENT
Start: 2024-05-03

## 2024-05-03 RX ORDER — SEMAGLUTIDE 2.4 MG/.75ML
2.4 INJECTION, SOLUTION SUBCUTANEOUS WEEKLY
Qty: 9 ML | Refills: 3 | Status: SHIPPED | OUTPATIENT
Start: 2024-05-03 | End: 2024-07-30 | Stop reason: DRUGHIGH

## 2024-05-03 RX ORDER — SEMAGLUTIDE 1.7 MG/.75ML
1.7 INJECTION, SOLUTION SUBCUTANEOUS WEEKLY
Qty: 3 ML | Refills: 0 | Status: SHIPPED | OUTPATIENT
Start: 2024-05-03 | End: 2024-06-05

## 2024-05-03 RX ORDER — SEMAGLUTIDE 1 MG/.5ML
1 INJECTION, SOLUTION SUBCUTANEOUS WEEKLY
Qty: 2 ML | Refills: 0 | Status: SHIPPED | OUTPATIENT
Start: 2024-05-03 | End: 2024-05-31

## 2024-05-03 ASSESSMENT — PAIN SCALES - GENERAL: PAINLEVEL: SEVERE PAIN (6)

## 2024-05-03 NOTE — NURSING NOTE
Is the patient currently in the state of [place]? YES    Visit mode:VIDEO    If the visit is dropped, the patient can be reconnected by: VIDEO VISIT: Text to cell phone:   Telephone Information:   Mobile [phone]       Will anyone else be joining the visit? NO  (If patient encounters technical issues they should call [phone])    How would you like to obtain your AVS? MyChart    Are changes needed to the allergy or medication list? No    Are refills needed on medications prescribed by this physician? YES    Reason for visit: RECHECK    Marcella KRAMER       What Is The Reason For Today's Visit?: History of Melanoma What Stage Is The Melanoma?: Stage IA Year Excised?: 2023 [place] Depth?: 0.3mm

## 2024-05-03 NOTE — PROGRESS NOTES
Virtual Visit Details    Type of service:  Video Visit   Video Start Time:  2:37  Video End Time: 3:07    Originating Location (pt. Location): Home    Distant Location (provider location):  On-site  Platform used for Video Visit: [facility]      Bariatric Follow Up Visit with a History of Previous Bariatric Surgery     Date of visit: 5/3/2024  Physician: Jinny Pierre MD, MD  Primary Care Provider:  Lali Humphries   34 year old  female    Date of Surgery: 10/27/2020  Initial Weight: 266#  Initial BMI: 45.74  Today's Weight:   Wt Readings from Last 1 Encounters:   05/03/24 93 kg (205 lb)     Body mass index is 35.19 kg/m .      Assessment and Plan     Assessment: Russell is a 34 year old year old female who is 7.5 yrs s/p  Sleeve Gastrectomy with Dr. Rossi Gomez feels as if she had achieved the goals she hoped to accomplish through bariatric surgery and weight loss.    Encounter Diagnoses   Name Primary?    Obesity, Class I, BMI 30.0-34.9 (see actual BMI)     Overweight     Obesity (BMI 30-39.9) Yes         Current Outpatient Medications:     phentermine (ADIPEX-P) 37.5 MG tablet, TAKE 1/2 TO 1 (ONE-HALF TO ONE) TABLET BY MOUTH ONCE DAILY IN THE MORNING BEFORE BREAKFAST, Disp: 90 tablet, Rfl: 1    Semaglutide-Weight Management (WEGOVY) 0.5 MG/0.5ML pen, Inject 0.5 mg Subcutaneous once a week, Disp: 2 mL, Rfl: 0    Semaglutide-Weight Management (WEGOVY) 1 MG/0.5ML pen, Inject 1 mg Subcutaneous once a week for 28 days Please notify patient when available. Thank you, Disp: 2 mL, Rfl: 0    Semaglutide-Weight Management (WEGOVY) 1.7 MG/0.75ML pen, Inject 1.7 mg Subcutaneous once a week for 28 days Please notify patient when available. Thank you, Disp: 3 mL, Rfl: 0    Semaglutide-Weight Management (WEGOVY) 2.4 MG/0.75ML pen, Inject 2.4 mg Subcutaneous once a week Please notify patient when available. Thank you, Disp: 9 mL, Rfl: 3    calcium carbonate (OS-BRYANNA) 500 MG tablet, Take  "1 tablet by mouth 2 times daily, Disp: , Rfl:     cyanocobalamin (VITAMIN B-12) 1000 MCG SUBL sublingual tablet, Place 1,000 mcg under the tongue daily, Disp: , Rfl:     ergocalciferol (ERGOCALCIFEROL) 1.25 MG (26297 UT) capsule, Take 50,000 Units by mouth, Disp: , Rfl:     ferrous fumarate 65 mg, [employer] [place]. FE,-Vitamin C 125 mg (VITRON C)  MG TABS tablet, Take 1 tablet by mouth daily, Disp: 90 tablet, Rfl: 3    Pediatric Multi Vit-Extra C-FA (FLINTSTONES/EXTRA C) CHEW, Take 1 tablet by mouth, Disp: , Rfl:     Semaglutide-Weight Management (WEGOVY) 0.25 MG/0.5ML pen, Inject 0.25 mg Subcutaneous once a week for 28 days, Disp: 2 mL, Rfl: 0    Vitamin D, Cholecalciferol, 25 MCG (1000 UT) TABS, , Disp: , Rfl:     Plan: Continue vitamins as is. Ramp up Wegovy. Let us know if wanting to extend or stay at dose, otherwise ramp up to 2.4mg.     Return in about 6 months (around 11/3/2024) for RD first then me in 6 mo.    Bariatric Surgery Review     Interim History/LifeChanges: Maintaining a 61# weight loss. Stress is incredibly high. 14 yo Son having behavioral problems at school. Daughter diagnosed with autism. Unable to lose weight as not sleeping. Preparing to move as well. Contemplating exercise bike. Started the 0.25mg Wegovy and has had 3 injections. Does not feel anything.     Patient Concerns: weight  Appetite (1-10): up  GERD:     Medication changes: no    Vitamin Intake:   B-12   SL   MVI  PNV   Vitamin D  5,000   Calcium        Other                LABS: \"Reviewed  excellent  Nausea no  Vomiting no  Constipation no  Diarrhea no  Rashes no  Hair Loss no  Calf tenderness no  Breathing difficulty no  Reactive Hypoglycemia sometimes feels bad  Light Headedness no   Moods up and down sometimes with stress. Very much focused on weight.    12 point ROS as above and otherwise negative      Habits:  Alcohol: no  Tobacco: no  Caffeine yes  NSAIDS no  Exercise Routine: unable currently. Used to be 1 hour a day of dancing at " home  3 meals/day yes  Protein first yes  60+grams/day  Water Separate from meals yes  Calorie Containing Beverages no  Restaurant eating/wk <2  Sleeping not well d/t high stress maybe 6   Stress high!  CPAP: NA  Contraception: TL  DEXA:NA age <45    Social History     Social History     Socioeconomic History    Marital status: Single     Spouse name: Not on file    Number of children: Not on file    Years of education: Not on file    Highest education level: Not on file   Occupational History    Not on file   Tobacco Use    Smoking status: Never    Smokeless tobacco: Never   Substance and Sexual Activity    Alcohol use: No    Drug use: No    Sexual activity: Yes     Partners: Male     Birth control/protection: Condom   Other Topics Concern    Not on file   Social History Narrative    Single, one son, Daughter born 10/2021 Stopped working d/t son's behavior at school and dauther's DX autism.   Son's father is in [place] and not involved  Close with her father who lives in [place]  Not close with her mother who lives here   Miscarried twin boys. Looks after her 19 yo niece who has learning challenges and watches her kids for her while she is at work. Living with her kids now without the father of her youngest daughter.     Social Determinants of Health     Financial Resource Strain: Not on file   Food Insecurity: Not on file   Transportation Needs: Not on file   Physical Activity: Not on file   Stress: Not on file   Social Connections: Not on file   Interpersonal Safety: Not on file   Housing Stability: Not on file       Past Medical History     Past Medical History:   Diagnosis Date    Acanthosis nigricans     Accessory skin tags     Anemia     Asthma     Bacterial vaginosis 05/03/2019    3/21/19    Cervical insufficiency during pregnancy in second trimester, antepartum 05/03/2019    Chlamydia     Depression     Dichorionic diamniotic twin pregnancy in second trimester 05/03/2019    Diet controlled gestational  diabetes mellitus (GDM) in second trimester 2019    Gestational diabetes     Gonococcal infection (acute) of lower genitourinary tract     Created by Conversion     Heartburn     Herpes simplex     Hirsutism     History of  delivery, currently pregnant in first trimester 2019    Low back pain     Menorrhagia     Morbid obesity (H)      labor 2011    Single liveborn infant delivered vaginally 10/13/2021    Status post bilateral salpingectomy 10/13/2021     Problem List     Patient Active Problem List   Diagnosis    Morbid obesity (H)    Anemia    Rh negative state in antepartum period, second trimester    History of bariatric surgery    History of gestational diabetes mellitus    Status post bilateral salpingectomy    Single liveborn infant delivered vaginally     Medications       Current Outpatient Medications:     phentermine (ADIPEX-P) 37.5 MG tablet, TAKE 1/2 TO 1 (ONE-HALF TO ONE) TABLET BY MOUTH ONCE DAILY IN THE MORNING BEFORE BREAKFAST, Disp: 90 tablet, Rfl: 1    Semaglutide-Weight Management (WEGOVY) 0.5 MG/0.5ML pen, Inject 0.5 mg Subcutaneous once a week, Disp: 2 mL, Rfl: 0    Semaglutide-Weight Management (WEGOVY) 1 MG/0.5ML pen, Inject 1 mg Subcutaneous once a week for 28 days Please notify patient when available. Thank you, Disp: 2 mL, Rfl: 0    Semaglutide-Weight Management (WEGOVY) 1.7 MG/0.75ML pen, Inject 1.7 mg Subcutaneous once a week for 28 days Please notify patient when available. Thank you, Disp: 3 mL, Rfl: 0    Semaglutide-Weight Management (WEGOVY) 2.4 MG/0.75ML pen, Inject 2.4 mg Subcutaneous once a week Please notify patient when available. Thank you, Disp: 9 mL, Rfl: 3    calcium carbonate (OS-BRYANNA) 500 MG tablet, Take 1 tablet by mouth 2 times daily, Disp: , Rfl:     cyanocobalamin (VITAMIN B-12) 1000 MCG SUBL sublingual tablet, Place 1,000 mcg under the tongue daily, Disp: , Rfl:     ergocalciferol (ERGOCALCIFEROL) 1.25 MG (32871 UT) capsule, Take 50,000  Units by mouth, Disp: , Rfl:     ferrous fumarate 65 mg, [place]. FE,-Vitamin C 125 mg (VITRON C)  MG TABS tablet, Take 1 tablet by mouth daily, Disp: 90 tablet, Rfl: 3    Pediatric Multi Vit-Extra C-FA (FLINTSTONES/EXTRA C) CHEW, Take 1 tablet by mouth, Disp: , Rfl:     Semaglutide-Weight Management (WEGOVY) 0.25 MG/0.5ML pen, Inject 0.25 mg Subcutaneous once a week for 28 days, Disp: 2 mL, Rfl: 0    Vitamin D, Cholecalciferol, 25 MCG (1000 UT) TABS, , Disp: , Rfl:    Surgical History     Past Surgical History  She has a past surgical history that includes Tonsillectomy; Pr Lap, Chanel Restrict Proc, Longitudinal Gastrectomy (N/A, 10/27/2020); and Laparotomy mini, tubal ligation (post partum), combined (Bilateral, 10/11/2021).    Objective-Exam     Constitutional:  Wt 93 kg (205 lb)   BMI 35.19 kg/m    General:  Pleasant and in no acute distress     Psychiatric: alert and oriented X3, mood and affect normal    Counseling     We reviewed the important post op bariatric recommendations:  -eating 3 meals daily  -eating protein first, getting >60gm protein daily  -eating slowly, chewing food well  -avoiding/limiting calorie containing beverages  -drinking water 15-30 minutes before or after meals  -choosing wheat, not white with breads, crackers, pastas, edwina, bagels, tortillas, rice  -limiting restaurant or cafeteria eating to twice a week or less    We discussed the importance of restorative sleep and stress management in maintaining a healthy weight.  We discussed the National Weight Control Registry healthy weight maintenance strategies and ways to optimize metabolism.  We discussed the importance of physical activity including cardiovascular and strength training in maintaining a healthier weight.    We discussed the importance of life-long vitamin supplementation and life-long  follow-up.    Russell was reminded that, to avoid marginal ulcers she should avoid tobacco at all, alcohol in excess, caffeine in  excess, and NSAIDS (unless indicated for cardioprotection or othewise and opposed by a PPI).    Jinny Pierre MD, MD, [facility].  5/3/2024  2:42 PM  Total time spent on the date of this encounter doing: chart review, review of test results, patient visit, physical exam, education, counseling, developing plan of care, and documenting = 30 minutes.

## 2024-05-03 NOTE — LETTER
5/3/2024         RE: Russell Gomez  [address] [place] [address]        Dear Colleague,    Thank you for referring your patient, Russell Gomez, to the [facility] [place]. Please see a copy of my visit note below.    Virtual Visit Details    Type of service:  Video Visit   Video Start Time:  2:37  Video End Time: 3:07    Originating Location (pt. Location): Home    Distant Location (provider location):  On-site  Platform used for Video Visit: [facility]      Bariatric Follow Up Visit with a History of Previous Bariatric Surgery     Date of visit: 5/3/2024  Physician: Jinny Pierre MD, MD  Primary Care Provider:  Lali Humphries   34 year old  female    Date of Surgery: 10/27/2020  Initial Weight: 266#  Initial BMI: 45.74  Today's Weight:   Wt Readings from Last 1 Encounters:   05/03/24 93 kg (205 lb)     Body mass index is 35.19 kg/m .      Assessment and Plan     Assessment: Russell is a 34 year old year old female who is 7.5 yrs s/p  Sleeve Gastrectomy with Dr. Rossi Gomez feels as if she had achieved the goals she hoped to accomplish through bariatric surgery and weight loss.    Encounter Diagnoses   Name Primary?     Obesity, Class I, BMI 30.0-34.9 (see actual BMI)      Overweight      Obesity (BMI 30-39.9) Yes         Current Outpatient Medications:      phentermine (ADIPEX-P) 37.5 MG tablet, TAKE 1/2 TO 1 (ONE-HALF TO ONE) TABLET BY MOUTH ONCE DAILY IN THE MORNING BEFORE BREAKFAST, Disp: 90 tablet, Rfl: 1     Semaglutide-Weight Management (WEGOVY) 0.5 MG/0.5ML pen, Inject 0.5 mg Subcutaneous once a week, Disp: 2 mL, Rfl: 0     Semaglutide-Weight Management (WEGOVY) 1 MG/0.5ML pen, Inject 1 mg Subcutaneous once a week for 28 days Please notify patient when available. Thank you, Disp: 2 mL, Rfl: 0     Semaglutide-Weight Management (WEGOVY) 1.7 MG/0.75ML pen, Inject 1.7 mg Subcutaneous once a week for 28 days  "Please notify patient when available. Thank you, Disp: 3 mL, Rfl: 0     Semaglutide-Weight Management (WEGOVY) 2.4 MG/0.75ML pen, Inject 2.4 mg Subcutaneous once a week Please notify patient when available. Thank you, Disp: 9 mL, Rfl: 3     calcium carbonate (OS-BRYANNA) 500 MG tablet, Take 1 tablet by mouth 2 times daily, Disp: , Rfl:      cyanocobalamin (VITAMIN B-12) 1000 MCG SUBL sublingual tablet, Place 1,000 mcg under the tongue daily, Disp: , Rfl:      ergocalciferol (ERGOCALCIFEROL) 1.25 MG (11198 UT) capsule, Take 50,000 Units by mouth, Disp: , Rfl:      ferrous fumarate 65 mg, [place]. FE,-Vitamin C 125 mg (VITRON C)  MG TABS tablet, Take 1 tablet by mouth daily, Disp: 90 tablet, Rfl: 3     Pediatric Multi Vit-Extra C-FA (FLINTSTONES/EXTRA C) CHEW, Take 1 tablet by mouth, Disp: , Rfl:      Semaglutide-Weight Management (WEGOVY) 0.25 MG/0.5ML pen, Inject 0.25 mg Subcutaneous once a week for 28 days, Disp: 2 mL, Rfl: 0     Vitamin D, Cholecalciferol, 25 MCG (1000 UT) TABS, , Disp: , Rfl:     Plan: Continue vitamins as is. Ramp up Wegovy. Let us know if wanting to extend or stay at dose, otherwise ramp up to 2.4mg.     Return in about 6 months (around 11/3/2024) for RD first then me in 6 mo.    Bariatric Surgery Review     Interim History/LifeChanges: Maintaining a 61# weight loss. Stress is incredibly high. 14 yo Son having behavioral problems at school. Daughter diagnosed with autism. Unable to lose weight as not sleeping. Preparing to move as well. Contemplating exercise bike. Started the 0.25mg Wegovy and has had 3 injections. Does not feel anything.     Patient Concerns: weight  Appetite (1-10): up  GERD:     Medication changes: no    Vitamin Intake:   B-12   SL   MVI  PNV   Vitamin D  5,000   Calcium        Other                LABS: \"Reviewed  excellent  Nausea no  Vomiting no  Constipation no  Diarrhea no  Rashes no  Hair Loss no  Calf tenderness no  Breathing difficulty no  Reactive Hypoglycemia " sometimes feels bad  Light Headedness no   Moods up and down sometimes with stress. Very much focused on weight.    12 point ROS as above and otherwise negative      Habits:  Alcohol: no  Tobacco: no  Caffeine yes  NSAIDS no  Exercise Routine: unable currently. Used to be 1 hour a day of dancing at home  3 meals/day yes  Protein first yes  60+grams/day  Water Separate from meals yes  Calorie Containing Beverages no  Restaurant eating/wk <2  Sleeping not well d/t high stress maybe 6   Stress high!  CPAP: NA  Contraception: TL  DEXA:NA age <45    Social History     Social History     Socioeconomic History     Marital status: Single     Spouse name: Not on file     Number of children: Not on file     Years of education: Not on file     Highest education level: Not on file   Occupational History     Not on file   Tobacco Use     Smoking status: Never     Smokeless tobacco: Never   Substance and Sexual Activity     Alcohol use: No     Drug use: No     Sexual activity: Yes     Partners: Male     Birth control/protection: Condom   Other Topics Concern     Not on file   Social History Narrative    Single, one son, Daughter born 10/2021 Stopped working d/t son's behavior at school and dauther's DX autism.   Son's father is in [place] and not involved  Close with her father who lives in [place]  Not close with her mother who lives here   Miscarried twin boys. Looks after her 19 yo niece who has learning challenges and watches her kids for her while she is at work. Living with her kids now without the father of her youngest daughter.     Social Determinants of Health     Financial Resource Strain: Not on file   Food Insecurity: Not on file   Transportation Needs: Not on file   Physical Activity: Not on file   Stress: Not on file   Social Connections: Not on file   Interpersonal Safety: Not on file   Housing Stability: Not on file       Past Medical History     Past Medical History:   Diagnosis Date     Acanthosis nigricans       Accessory skin tags      Anemia      Asthma      Bacterial vaginosis 2019    3/21/19     Cervical insufficiency during pregnancy in second trimester, antepartum 2019     Chlamydia      Depression      Dichorionic diamniotic twin pregnancy in second trimester 2019     Diet controlled gestational diabetes mellitus (GDM) in second trimester 2019     Gestational diabetes      Gonococcal infection (acute) of lower genitourinary tract     Created by Conversion      Heartburn      Herpes simplex      Hirsutism      History of  delivery, currently pregnant in first trimester 2019     Low back pain      Menorrhagia      Morbid obesity (H)       labor 2011     Single liveborn infant delivered vaginally 10/13/2021     Status post bilateral salpingectomy 10/13/2021     Problem List     Patient Active Problem List   Diagnosis     Morbid obesity (H)     Anemia     Rh negative state in antepartum period, second trimester     History of bariatric surgery     History of gestational diabetes mellitus     Status post bilateral salpingectomy     Single liveborn infant delivered vaginally     Medications       Current Outpatient Medications:      phentermine (ADIPEX-P) 37.5 MG tablet, TAKE 1/2 TO 1 (ONE-HALF TO ONE) TABLET BY MOUTH ONCE DAILY IN THE MORNING BEFORE BREAKFAST, Disp: 90 tablet, Rfl: 1     Semaglutide-Weight Management (WEGOVY) 0.5 MG/0.5ML pen, Inject 0.5 mg Subcutaneous once a week, Disp: 2 mL, Rfl: 0     Semaglutide-Weight Management (WEGOVY) 1 MG/0.5ML pen, Inject 1 mg Subcutaneous once a week for 28 days Please notify patient when available. Thank you, Disp: 2 mL, Rfl: 0     Semaglutide-Weight Management (WEGOVY) 1.7 MG/0.75ML pen, Inject 1.7 mg Subcutaneous once a week for 28 days Please notify patient when available. Thank you, Disp: 3 mL, Rfl: 0     Semaglutide-Weight Management (WEGOVY) 2.4 MG/0.75ML pen, Inject 2.4 mg Subcutaneous once a week Please notify  patient when available. Thank you, Disp: 9 mL, Rfl: 3     calcium carbonate (OS-BRYANNA) 500 MG tablet, Take 1 tablet by mouth 2 times daily, Disp: , Rfl:      cyanocobalamin (VITAMIN B-12) 1000 MCG SUBL sublingual tablet, Place 1,000 mcg under the tongue daily, Disp: , Rfl:      ergocalciferol (ERGOCALCIFEROL) 1.25 MG (64475 UT) capsule, Take 50,000 Units by mouth, Disp: , Rfl:      ferrous fumarate 65 mg, [place]. FE,-Vitamin C 125 mg (VITRON C)  MG TABS tablet, Take 1 tablet by mouth daily, Disp: 90 tablet, Rfl: 3     Pediatric Multi Vit-Extra C-FA (FLINTSTONES/EXTRA C) CHEW, Take 1 tablet by mouth, Disp: , Rfl:      Semaglutide-Weight Management (WEGOVY) 0.25 MG/0.5ML pen, Inject 0.25 mg Subcutaneous once a week for 28 days, Disp: 2 mL, Rfl: 0     Vitamin D, Cholecalciferol, 25 MCG (1000 UT) TABS, , Disp: , Rfl:    Surgical History     Past Surgical History  She has a past surgical history that includes Tonsillectomy; Pr Lap, Chanel Restrict Proc, Longitudinal Gastrectomy (N/A, 10/27/2020); and Laparotomy mini, tubal ligation (post partum), combined (Bilateral, 10/11/2021).    Objective-Exam     Constitutional:  Wt 93 kg (205 lb)   BMI 35.19 kg/m    General:  Pleasant and in no acute distress     Psychiatric: alert and oriented X3, mood and affect normal    Counseling     We reviewed the important post op bariatric recommendations:  -eating 3 meals daily  -eating protein first, getting >60gm protein daily  -eating slowly, chewing food well  -avoiding/limiting calorie containing beverages  -drinking water 15-30 minutes before or after meals  -choosing wheat, not white with breads, crackers, pastas, edwina, bagels, tortillas, rice  -limiting restaurant or cafeteria eating to twice a week or less    We discussed the importance of restorative sleep and stress management in maintaining a healthy weight.  We discussed the National Weight Control Registry healthy weight maintenance strategies and ways to optimize  metabolism.  We discussed the importance of physical activity including cardiovascular and strength training in maintaining a healthier weight.    We discussed the importance of life-long vitamin supplementation and life-long  follow-up.    Russell was reminded that, to avoid marginal ulcers she should avoid tobacco at all, alcohol in excess, caffeine in excess, and NSAIDS (unless indicated for cardioprotection or othewise and opposed by a PPI).    Jinny Pierre MD, MD, [facility].  5/3/2024  2:42 PM  Total time spent on the date of this encounter doing: chart review, review of test results, patient visit, physical exam, education, counseling, developing plan of care, and documenting = 30 minutes.      Again, thank you for allowing me to participate in the care of your patient.        Sincerely,        Jinny Pierre MD

## 2024-06-05 ENCOUNTER — MYC REFILL (OUTPATIENT)
Dept: SURGERY | Facility: CLINIC | Age: 34
End: 2024-06-05
Payer: COMMERCIAL

## 2024-06-05 DIAGNOSIS — E66.9 OBESITY (BMI 30-39.9): ICD-10-CM

## 2024-06-06 RX ORDER — SEMAGLUTIDE 1.7 MG/.75ML
1.7 INJECTION, SOLUTION SUBCUTANEOUS WEEKLY
Qty: 3 ML | Refills: 0 | Status: SHIPPED | OUTPATIENT
Start: 2024-06-06 | End: 2024-07-01

## 2024-07-01 ENCOUNTER — MYC REFILL (OUTPATIENT)
Dept: SURGERY | Facility: CLINIC | Age: 34
End: 2024-07-01
Payer: COMMERCIAL

## 2024-07-01 DIAGNOSIS — E66.9 OBESITY (BMI 30-39.9): ICD-10-CM

## 2024-07-01 RX ORDER — SEMAGLUTIDE 1.7 MG/.75ML
1.7 INJECTION, SOLUTION SUBCUTANEOUS WEEKLY
Qty: 3 ML | Refills: 0 | Status: SHIPPED | OUTPATIENT
Start: 2024-07-01 | End: 2024-07-30

## 2024-07-02 ENCOUNTER — VIRTUAL VISIT (OUTPATIENT)
Dept: SURGERY | Facility: CLINIC | Age: 34
End: 2024-07-02
Payer: COMMERCIAL

## 2024-07-02 DIAGNOSIS — Z98.84 S/P BARIATRIC SURGERY: Primary | ICD-10-CM

## 2024-07-02 DIAGNOSIS — Z71.3 NUTRITIONAL COUNSELING: ICD-10-CM

## 2024-07-02 PROCEDURE — 97803 MED NUTRITION INDIV SUBSEQ: CPT | Mod: 93

## 2024-07-02 NOTE — LETTER
7/2/2024      Russell Gomez  [address] [place] [address]      Dear Colleague,    Thank you for referring your patient, Russell Gomez, to the [facility] AND [facility] [place]. Please see a copy of my visit note below.    Russell Gomez is a 34 year old who is being evaluated via a billable video visit.      How would you like to obtain your AVS? MyChart  If the video visit is dropped, the invitation should be resent by: Send as text Will anyone else be joining your video visit? No  {If patient encounters technical issues they should call [phone]      Medical weight management with h/o bariatric surgery.     Assessment:  Pt presents for  3.5 years  post-op RD visit, s/p LSG on 10/27/2020 with Dr. Richey. Today we reviewed current eating habits and level of physical activity, and instructed on the changes that are required for successful bariatric outcomes.    Patient Progress: Patient reports stressors are high. She finds herself skipping meals. For the last two months she is struggling with providing the essential needs for her family in regards to nutrition and daily essentials. Has WIC and EBT assistance. Continues to use protein shakes when she is able. Continues to prioritize lean proteins (eggs, turkey, cheese, yogurt, chicken), keeps fruit in the house to have assemble. Continues taking Wegovy and Phentermine and finding them beneficial to her health. Struggling with most with energy.   - choosing wheat over white        GOAL: reach 150 lbs        Initial weight: 266 lbs  Current weight: 190 lbs (last updated weight)   Weight change: 76 lbs (down), 28.5% loss    BMI: 34.9 kg/m2    Patient Active Problem List   Diagnosis     Morbid obesity (H)     Anemia     Rh negative state in antepartum period, second trimester     History of bariatric surgery     History of gestational diabetes mellitus     Status post bilateral salpingectomy     Single liveborn  "infant delivered vaginally       Vitamins   Multi Vit with Iron: yes  Calcium Citrate: yes  B12: yes  D3: yes      Diet Recall/Time:   Gets to work at 5 am    Typical Snacks: string cheese (here and there)     Proteins/Veg/Fruits/CHO (NOT well tolerated): avocado    Estimated protein intake: 40-60 grams    Meal Duration: 20 minutes    Fluid-meal separation:  Fluids are  30min before and 30 minutes after meals.    Fluid Intake  Water: all day long some with crystal light   Caffeine: 1 cup of coffee (on occasion, not daily)    Exercise: trying to find time to exercise      PES statement:      (NC-1.4) Altered GI Function related to Alteration in gastrointestinal tract structure and/or function/ Decreased functional length of the GI tract as evidenced by Weight loss of 28.5% initial body weight; sleeve gastrectomy      Intervention    Discussion  Recommended to consume 15-20gm protein at 3 meals daily, along with protein supplement/\"planned protein containing snack\" of 15-30gm protein, to reach goal of 60-80 gm protein daily.  Reviewed lean protein sources  Bariatric Plate Method-  including lean/low fat protein at each meal, including a vegetable/fruit, and limiting carbohydrate intake to less than 25% of plate volume.   Discussed budget friendly protein options and quick meal ideas         Handouts provided:  Nutrient dense foods  Youtube and move   Bariatric recipes     Assessment/Plan:    Follow up with RD in 4 months and bariatrician in 4 months      Virtual Visit Details    Type of service:  Telephone Visit   Phone call duration: 32 minutes       Originating Location (pt. Location): Home    Distant Location (provider location):  Off-site           Keira Levin RD, LD            Again, thank you for allowing me to participate in the care of your patient.        Sincerely,        Keira Levin RD  "

## 2024-07-02 NOTE — PATIENT INSTRUCTIONS
Nutrient Dense Foods (Eat Frequently)    Whole Grains  100% whole wheat breads and crackers  Bran, Oatmeal  Quinoa, couscous  Brown rice    Starchy Vegetables  Corn  Green peas  Potato, sweet potato  Amelia, butternut squash  Beans (black, garbanzo, kidney, lima, navy, rachel, white)  Lentils (all kinds)  Peas (split, black eyed)    Fruits  Apples, unsweetened applesauce  Banana  Blueberries, blackberries  Cantaloupe, honeydew  Grapes  Haliimaile, pineapple  Peaches, pears  Oranges, clementines  Raspberries, strawberries  Watermelon    Non-starchy Vegetables  Asparagus, zucchini  [place] sprouts  Green beans, sugar snap peas  Broccoli, cauliflower  Carrots, celery, cucumber  Onion  Salad greens (spinach, kale, lettuce, rj)  Bell pepper (all kinds)  Tomato    Milk/Yogurt  Light Yogurt  Light Greek Yogurt  Skim milk  Soy milk  Lactose-free skim milk    Proteins  Cottage cheese  Eggs, egg whites  Beef (90/10 ground, sirloin, roast, tenderloin)  Fish (Cod, tilapia, salmon, tuna)  Pork ([place] keen, ham, pork loin chop, tenderloin)  Turkey or chicken (breast, thin sliced deli, lean ground)  Clams, crab, lobster, shrimp  Light string cheese (50 braulio)    Fats  Avocado  Peanut butter  Nuts (almonds, walnuts, sunflower seeds)  Olives  Salad dressing  Olive, canola, avocado, vegetable oil  Flax seed, Hemp Seed    Beverages  Plain water  Regular/decaf black coffee  Unsweetened tea  Brewed/bag tea  Fruit infused water         Yoga-https://www.MOBi-LEARNube.com/user/yogawithadriene    Body strength activities, dance, high intensity interval training- https://www.MOBi-LEARNube.com/user/popsugartvfit    Strength training- https://www.MOBi-LEARNube.com/user/NatalyaportsPerform    Walking- https://www.MOBi-LEARNube.com/user/walkathomemedia    Sit and Be Fit- https://www.MOBi-LEARNube.com/channel/UCLgvL3aGzMByecNYtMcyK_g    Low impact cardio- Debra Zaldivar- https://www.youtube.com/channel/JZnxPHtCxguMuzrS7stjckqD    Cardio Diana Cardenas-  https://www.Zippy.com.au Pty LTDube.com/channel/FQBwAlyn2QQmXTYVP7lVqQFr    30 Min low impact cardio- https://www.youtube.com/watch?v=gC_L9qAHVJ8    Body Project- https://www.Zippy.com.au Pty LTDube.com/channel/ERGnd5L16-MuVvVUuKyyD3FW        Fresh Start Bariatric Cookbook by Beatrice Ledesma    The Gastric Sleeve Bariatric Cookbook by Beatrice Ledesma    The Complete Bariatric Cookbook and Meal Plan by Daxa Doherty    The Easy 5 Ingredient Bariatric Cookbook by Daxa Parsons    The High Protein Bariatric Cookbook by Felicity Luis    https://bariatricmealprep.com/bariatric-recipes/    Https://www.bariatricfoodie.com/    https://www.TRADE TO REBATE.[employer]/mbd-recipes

## 2024-07-02 NOTE — PROGRESS NOTES
Russell Gomez is a 34 year old who is being evaluated via a billable video visit.      How would you like to obtain your AVS? MyChart  If the video visit is dropped, the invitation should be resent by: Send as text Will anyone else be joining your video visit? No  {If patient encounters technical issues they should call [phone]      Medical weight management with h/o bariatric surgery.     Assessment:  Pt presents for  3.5 years  post-op RD visit, s/p LSG on 10/27/2020 with Dr. Richey. Today we reviewed current eating habits and level of physical activity, and instructed on the changes that are required for successful bariatric outcomes.    Patient Progress: Patient reports stressors are high. She finds herself skipping meals. For the last two months she is struggling with providing the essential needs for her family in regards to nutrition and daily essentials. Has WIC and EBT assistance. Continues to use protein shakes when she is able. Continues to prioritize lean proteins (eggs, turkey, cheese, yogurt, chicken), keeps fruit in the house to have assemble. Continues taking Wegovy and Phentermine and finding them beneficial to her health. Struggling with most with energy.   - choosing wheat over white        GOAL: reach 150 lbs        Initial weight: 266 lbs  Current weight: 190 lbs (last updated weight)   Weight change: 76 lbs (down), 28.5% loss    BMI: 34.9 kg/m2    Patient Active Problem List   Diagnosis    Morbid obesity (H)    Anemia    Rh negative state in antepartum period, second trimester    History of bariatric surgery    History of gestational diabetes mellitus    Status post bilateral salpingectomy    Single liveborn infant delivered vaginally       Vitamins   Multi Vit with Iron: yes  Calcium Citrate: yes  B12: yes  D3: yes      Diet Recall/Time:   Gets to work at 5 am    Typical Snacks: string cheese (here and there)     Proteins/Veg/Fruits/CHO (NOT well tolerated): avocado    Estimated  "protein intake: 40-60 grams    Meal Duration: 20 minutes    Fluid-meal separation:  Fluids are  30min before and 30 minutes after meals.    Fluid Intake  Water: all day long some with crystal light   Caffeine: 1 cup of coffee (on occasion, not daily)    Exercise: trying to find time to exercise      PES statement:      (NC-1.4) Altered GI Function related to Alteration in gastrointestinal tract structure and/or function/ Decreased functional length of the GI tract as evidenced by Weight loss of 28.5% initial body weight; sleeve gastrectomy      Intervention    Discussion  Recommended to consume 15-20gm protein at 3 meals daily, along with protein supplement/\"planned protein containing snack\" of 15-30gm protein, to reach goal of 60-80 gm protein daily.  Reviewed lean protein sources  Bariatric Plate Method-  including lean/low fat protein at each meal, including a vegetable/fruit, and limiting carbohydrate intake to less than 25% of plate volume.   Discussed budget friendly protein options and quick meal ideas         Handouts provided:  Nutrient dense foods  Youtube and move   Bariatric recipes     Assessment/Plan:    Follow up with RD in 4 months and bariatrician in 4 months      Virtual Visit Details    Type of service:  Telephone Visit   Phone call duration: 32 minutes       Originating Location (pt. Location): Home    Distant Location (provider location):  Off-site           Keira Levin RD, POLLY          "

## 2024-07-30 DIAGNOSIS — E66.9 OBESITY (BMI 30-39.9): ICD-10-CM

## 2024-08-28 ENCOUNTER — MYC MEDICAL ADVICE (OUTPATIENT)
Dept: SURGERY | Facility: CLINIC | Age: 34
End: 2024-08-28
Payer: COMMERCIAL

## 2024-08-28 DIAGNOSIS — E66.9 OBESITY (BMI 30-39.9): Primary | ICD-10-CM

## 2024-09-23 ENCOUNTER — MYC REFILL (OUTPATIENT)
Dept: SURGERY | Facility: CLINIC | Age: 34
End: 2024-09-23
Payer: COMMERCIAL

## 2024-09-23 DIAGNOSIS — E66.9 OBESITY (BMI 30-39.9): ICD-10-CM

## 2024-10-03 ENCOUNTER — LAB REQUISITION (OUTPATIENT)
Dept: LAB | Facility: CLINIC | Age: 34
End: 2024-10-03

## 2024-10-03 DIAGNOSIS — Z11.3 ENCOUNTER FOR SCREENING FOR INFECTIONS WITH A PREDOMINANTLY SEXUAL MODE OF TRANSMISSION: ICD-10-CM

## 2024-10-03 DIAGNOSIS — Z13.29 ENCOUNTER FOR SCREENING FOR OTHER SUSPECTED ENDOCRINE DISORDER: ICD-10-CM

## 2024-10-03 DIAGNOSIS — Z13.220 ENCOUNTER FOR SCREENING FOR LIPOID DISORDERS: ICD-10-CM

## 2024-10-03 DIAGNOSIS — Z13.1 ENCOUNTER FOR SCREENING FOR DIABETES MELLITUS: ICD-10-CM

## 2024-10-03 LAB — C TRACH DNA SPEC QL NAA+PROBE: NEGATIVE

## 2024-10-03 PROCEDURE — 87389 HIV-1 AG W/HIV-1&-2 AB AG IA: CPT | Performed by: OBSTETRICS & GYNECOLOGY

## 2024-10-03 PROCEDURE — 86780 TREPONEMA PALLIDUM: CPT | Performed by: OBSTETRICS & GYNECOLOGY

## 2024-10-03 PROCEDURE — 84443 ASSAY THYROID STIM HORMONE: CPT | Performed by: OBSTETRICS & GYNECOLOGY

## 2024-10-03 PROCEDURE — 87491 CHLMYD TRACH DNA AMP PROBE: CPT | Performed by: OBSTETRICS & GYNECOLOGY

## 2024-10-03 PROCEDURE — 80061 LIPID PANEL: CPT | Performed by: OBSTETRICS & GYNECOLOGY

## 2024-10-03 PROCEDURE — 83036 HEMOGLOBIN GLYCOSYLATED A1C: CPT | Performed by: OBSTETRICS & GYNECOLOGY

## 2024-10-03 PROCEDURE — 86803 HEPATITIS C AB TEST: CPT | Performed by: OBSTETRICS & GYNECOLOGY

## 2024-10-04 LAB
CHOLEST SERPL-MCNC: 120 MG/DL
EST. AVERAGE GLUCOSE BLD GHB EST-MCNC: 97 MG/DL
FASTING STATUS PATIENT QL REPORTED: NO
HBA1C MFR BLD: 5 %
HCV AB SERPL QL IA: NONREACTIVE
HDLC SERPL-MCNC: 58 MG/DL
HIV 1+2 AB+HIV1 P24 AG SERPL QL IA: NONREACTIVE
HOLD SPECIMEN: NORMAL
LDLC SERPL CALC-MCNC: 54 MG/DL
NONHDLC SERPL-MCNC: 62 MG/DL
T PALLIDUM AB SER QL: NONREACTIVE
TRIGL SERPL-MCNC: 39 MG/DL
TSH SERPL DL<=0.005 MIU/L-ACNC: 1.04 UIU/ML (ref 0.3–4.2)

## 2024-10-26 ENCOUNTER — MYC REFILL (OUTPATIENT)
Dept: SURGERY | Facility: CLINIC | Age: 34
End: 2024-10-26
Payer: COMMERCIAL

## 2024-10-26 DIAGNOSIS — E66.9 OBESITY (BMI 30-39.9): ICD-10-CM

## 2024-11-08 ENCOUNTER — VIRTUAL VISIT (OUTPATIENT)
Dept: SURGERY | Facility: CLINIC | Age: 34
End: 2024-11-08
Payer: COMMERCIAL

## 2024-11-08 VITALS — WEIGHT: 170 LBS | BODY MASS INDEX: 29.02 KG/M2 | HEIGHT: 64 IN

## 2024-11-08 DIAGNOSIS — E66.9 OBESITY (BMI 30-39.9): ICD-10-CM

## 2024-11-08 DIAGNOSIS — K91.2 POSTOPERATIVE MALABSORPTION: Primary | ICD-10-CM

## 2024-11-08 DIAGNOSIS — E66.3 OVERWEIGHT: ICD-10-CM

## 2024-11-08 PROCEDURE — 99214 OFFICE O/P EST MOD 30 MIN: CPT | Mod: 95 | Performed by: FAMILY MEDICINE

## 2024-11-08 RX ORDER — PHENTERMINE HYDROCHLORIDE 37.5 MG/1
TABLET ORAL
Qty: 90 TABLET | Refills: 1 | Status: SHIPPED | OUTPATIENT
Start: 2024-11-08

## 2024-11-08 ASSESSMENT — PAIN SCALES - GENERAL: PAINLEVEL_OUTOF10: NO PAIN (0)

## 2024-11-08 NOTE — NURSING NOTE
Current patient location: home    Is the patient currently in the state of [place]? YES    Visit mode:VIDEO    If the visit is dropped, the patient can be reconnected by: VIDEO VISIT: Text to cell phone:   Telephone Information:   Mobile [phone]       Will anyone else be joining the visit? NO  (If patient encounters technical issues they should call [phone]    Are changes needed to the allergy or medication list? Pt stated no changes to allergies and Pt stated no med changes    Are refills needed on medications prescribed by this physician? YES    Rooming Documentation:  Questionnaire(s) completed      Reason for visit: RECHECK    Wt other than 24 hrs:  Couple days ago  Pain more than one location: no   Garima LIANGF

## 2024-11-08 NOTE — LETTER
11/8/2024      Russell Gomez  [address] [place] [address]      Dear Colleague,    Thank you for referring your patient, Russell Gomez, to the [facility] AND BAR[facility]ATRICS CARE [place]. Please see a copy of my visit note below.    Virtual Visit Details    Type of service:  Video Visit   Video Start Time:  3:04  Video End Time: 3:34pm    Originating Location (pt. Location): Home    Distant Location (provider location):  On-site  Platform used for Video Visit: [facility] [place] [facility]      Bariatric Follow Up Visit with a History of Previous Bariatric Surgery     Date of visit: 11/8/2024  Physician: Jinny Pierre MD, MD  Primary Care Provider:  Lali Humphries   34 year old  female    Date of Surgery: 10/27/220  Initial Weight: 266#  Initial BMI: 45.74  Today's Weight:   Wt Readings from Last 1 Encounters:   11/08/24 77.1 kg (170 lb)     Body mass index is 29.18 kg/m .      Assessment and Plan     Assessment: Russell is a 34 year old year old female who is 4 years s/p  Sleeve Gastrectomy with Dr. Rossi Gomez feels as if she has achieved the goals she hoped to accomplish through bariatric surgery and weight loss.    Encounter Diagnoses   Name Primary?     Postoperative malabsorption Yes     Overweight      Obesity (BMI 30-39.9)          Current Outpatient Medications:      phentermine (ADIPEX-P) 37.5 MG tablet, TAKE 1/2 TO 1 (ONE-HALF TO ONE) TABLET BY MOUTH ONCE DAILY IN THE MORNING BEFORE BREAKFAST, Disp: 90 tablet, Rfl: 1     Semaglutide-Weight Management (WEGOVY) 1.7 MG/0.75ML pen, Inject 1.7 mg subcutaneously once a week., Disp: 9 mL, Rfl: 3     calcium carbonate (OS-BRYANNA) 500 MG tablet, Take 1 tablet by mouth 2 times daily, Disp: , Rfl:      cyanocobalamin (VITAMIN B-12) 1000 MCG SUBL sublingual tablet, Place 1,000 mcg under the tongue daily, Disp: , Rfl:      ergocalciferol (ERGOCALCIFEROL) 1.25 MG (83871 UT) capsule, Take 50,000 Units by mouth, Disp:  ", Rfl:      ferrous fumarate 65 mg, [place]. FE,-Vitamin C 125 mg (VITRON C)  MG TABS tablet, Take 1 tablet by mouth daily, Disp: 90 tablet, Rfl: 3     Pediatric Multi Vit-Extra C-FA (FLINTSTONES/EXTRA C) CHEW, Take 1 tablet by mouth, Disp: , Rfl:      Vitamin D, Cholecalciferol, 25 MCG (1000 UT) TABS, , Disp: , Rfl:     Plan: Continue vitamins with consistency. Anticipate hair regrowth. Staying at 1.7mg OK.     Annual with labs    Bariatric Surgery Review     Interim History/LifeChanges: Doing very well with Semaglutide. Daughter is in school 2X/wk now. Taking phentermine. The phentermine gives energy and Wegovy helps with everything else including stress eating and emotional eating. I can keep up with my kids. This is the bst feeling ever. Can keep up with her daughter and she likes to run a lot. Having hair loss. Did not like the 2.4mg Wegovy as she had side effects she didn't like. The injection hurt.         Patient Concerns: doing well  Appetite (1-10): low  GERD: no    Medication changes: no    Vitamin Intake:   B-12   SL   MVI  PNV two daily   Vitamin D  4,000   Calcium        Other                LABS: \"Reviewed  excellent  Nausea no  Vomiting no  Constipation no  Diarrhea no  Rashes yes  Hair Loss yes  Calf tenderness no  Breathing difficulty no  Reactive Hypoglycemia no  Light Headedness no   Moods euthymic    12 point ROS as above and otherwise negative      Habits:  Alcohol: no  Tobacco: no  Caffeine coffee  NSAIDS no  Exercise Routine: Walking  3 meals/day yes  Protein first yes   60 grams/day gets a protein shake when busy instead of skipping a meal  Water Separate from meals yes  Calorie Containing Beverages no  Restaurant eating/wk <2  Sleeping not well  Stress high  CPAP: NA  Contraception: TL  DEXA:NA    Social History     Social History     Socioeconomic History     Marital status: Single     Spouse name: Not on file     Number of children: Not on file     Years of education: Not on file     " Highest education level: Not on file   Occupational History     Not on file   Tobacco Use     Smoking status: Never     Smokeless tobacco: Never   Vaping Use     Vaping status: Never Used   Substance and Sexual Activity     Alcohol use: No     Drug use: No     Sexual activity: Yes     Partners: Male     Birth control/protection: Condom   Other Topics Concern     Not on file   Social History Narrative    Single, one son, Daughter born 10/2021 Stopped working d/t son's behavior at school and dauther's DX autism.   Son's father is in [place] and not involved  Close with her father who lives in [place]  Not close with her mother who lives here   Miscarried twin boys. Looks after her 17 yo niece who has learning challenges and watches her kids for her while she is at work. Living with her kids now without the father of her youngest daughter.     Social Drivers of Health     Financial Resource Strain: Not on file   Food Insecurity: Not on file   Transportation Needs: Not on file   Physical Activity: Not on file   Stress: Not on file   Social Connections: Not on file   Interpersonal Safety: Not on file   Housing Stability: Not on file       Past Medical History     Past Medical History:   Diagnosis Date     Acanthosis nigricans      Accessory skin tags      Anemia      Asthma      Bacterial vaginosis 2019    3/21/19     Cervical insufficiency during pregnancy in second trimester, antepartum 2019     Chlamydia      Depression      Dichorionic diamniotic twin pregnancy in second trimester 2019     Diet controlled gestational diabetes mellitus (GDM) in second trimester 2019     Gestational diabetes      Gonococcal infection (acute) of lower genitourinary tract     Created by Conversion      Heartburn      Herpes simplex      Hirsutism      History of  delivery, currently pregnant in first trimester 2019     Low back pain      Menorrhagia      Morbid obesity (H)       labor  "02/03/2011     Single liveborn infant delivered vaginally 10/13/2021     Status post bilateral salpingectomy 10/13/2021     Problem List     Patient Active Problem List   Diagnosis     Morbid obesity (H)     Anemia     Rh negative state in antepartum period, second trimester     History of bariatric surgery     History of gestational diabetes mellitus     Status post bilateral salpingectomy     Single liveborn infant delivered vaginally     Medications       Current Outpatient Medications:      phentermine (ADIPEX-P) 37.5 MG tablet, TAKE 1/2 TO 1 (ONE-HALF TO ONE) TABLET BY MOUTH ONCE DAILY IN THE MORNING BEFORE BREAKFAST, Disp: 90 tablet, Rfl: 1     Semaglutide-Weight Management (WEGOVY) 1.7 MG/0.75ML pen, Inject 1.7 mg subcutaneously once a week., Disp: 9 mL, Rfl: 3     calcium carbonate (OS-BRYANNA) 500 MG tablet, Take 1 tablet by mouth 2 times daily, Disp: , Rfl:      cyanocobalamin (VITAMIN B-12) 1000 MCG SUBL sublingual tablet, Place 1,000 mcg under the tongue daily, Disp: , Rfl:      ergocalciferol (ERGOCALCIFEROL) 1.25 MG (19119 UT) capsule, Take 50,000 Units by mouth, Disp: , Rfl:      ferrous fumarate 65 mg, [place]. FE,-Vitamin C 125 mg (VITRON C)  MG TABS tablet, Take 1 tablet by mouth daily, Disp: 90 tablet, Rfl: 3     Pediatric Multi Vit-Extra C-FA (FLINTSTONES/EXTRA C) CHEW, Take 1 tablet by mouth, Disp: , Rfl:      Vitamin D, Cholecalciferol, 25 MCG (1000 UT) TABS, , Disp: , Rfl:    Surgical History     Past Surgical History  She has a past surgical history that includes Tonsillectomy; Pr Lap, Chanel Restrict Proc, Longitudinal Gastrectomy (N/A, 10/27/2020); and Laparotomy mini, tubal ligation (post partum), combined (Bilateral, 10/11/2021).    Objective-Exam     Constitutional:  Ht 1.626 m (5' 4\")   Wt 77.1 kg (170 lb)   BMI 29.18 kg/m    General:  Pleasant and in no acute distress     Psychiatric: alert and oriented X3, mood and affect normal    Counseling     We reviewed the important post op " bariatric recommendations:  -eating 3 meals daily  -eating protein first, getting >60gm protein daily  -eating slowly, chewing food well  -avoiding/limiting calorie containing beverages  -drinking water 15-30 minutes before or after meals  -choosing wheat, not white with breads, crackers, pastas, edwina, bagels, tortillas, rice  -limiting restaurant or cafeteria eating to twice a week or less    We discussed the importance of restorative sleep and stress management in maintaining a healthy weight.  We discussed the National Weight Control Registry healthy weight maintenance strategies and ways to optimize metabolism.  We discussed the importance of physical activity including cardiovascular and strength training in maintaining a healthier weight.    We discussed the importance of life-long vitamin supplementation and life-long  follow-up.    Russell was reminded that, to avoid marginal ulcers she should avoid tobacco at all, alcohol in excess, caffeine in excess, and NSAIDS (unless indicated for cardioprotection or othewise and opposed by a PPI).    Jinny Pierre MD, MD, [facility].  11/8/2024  3:09 PM  Total time spent on the date of this encounter doing: chart review, review of test results, patient visit, physical exam, education, counseling, developing plan of care, and documenting = 30 minutes.      Again, thank you for allowing me to participate in the care of your patient.        Sincerely,        Jinny Pierre MD

## 2024-11-08 NOTE — PROGRESS NOTES
Virtual Visit Details    Type of service:  Video Visit   Video Start Time:  3:04  Video End Time: 3:34pm    Originating Location (pt. Location): Home    Distant Location (provider location):  On-site  Platform used for Video Visit: [facility]      Bariatric Follow Up Visit with a History of Previous Bariatric Surgery     Date of visit: 11/8/2024  Physician: Jinny Pierre MD, MD  Primary Care Provider:  Lali Humphries   34 year old  female    Date of Surgery: 10/27/220  Initial Weight: 266#  Initial BMI: 45.74  Today's Weight:   Wt Readings from Last 1 Encounters:   11/08/24 77.1 kg (170 lb)     Body mass index is 29.18 kg/m .      Assessment and Plan     Assessment: Russell is a 34 year old year old female who is 4 years s/p  Sleeve Gastrectomy with Dr. Rossi Gomez feels as if she has achieved the goals she hoped to accomplish through bariatric surgery and weight loss.    Encounter Diagnoses   Name Primary?    Postoperative malabsorption Yes    Overweight     Obesity (BMI 30-39.9)          Current Outpatient Medications:     phentermine (ADIPEX-P) 37.5 MG tablet, TAKE 1/2 TO 1 (ONE-HALF TO ONE) TABLET BY MOUTH ONCE DAILY IN THE MORNING BEFORE BREAKFAST, Disp: 90 tablet, Rfl: 1    Semaglutide-Weight Management (WEGOVY) 1.7 MG/0.75ML pen, Inject 1.7 mg subcutaneously once a week., Disp: 9 mL, Rfl: 3    calcium carbonate (OS-BRYANNA) 500 MG tablet, Take 1 tablet by mouth 2 times daily, Disp: , Rfl:     cyanocobalamin (VITAMIN B-12) 1000 MCG SUBL sublingual tablet, Place 1,000 mcg under the tongue daily, Disp: , Rfl:     ergocalciferol (ERGOCALCIFEROL) 1.25 MG (90871 UT) capsule, Take 50,000 Units by mouth, Disp: , Rfl:     ferrous fumarate 65 mg, [place]. FE,-Vitamin C 125 mg (VITRON C)  MG TABS tablet, Take 1 tablet by mouth daily, Disp: 90 tablet, Rfl: 3    Pediatric Multi Vit-Extra C-FA (FLINTSTONES/EXTRA C) CHEW, Take 1 tablet by mouth, Disp: , Rfl:     Vitamin D,  "Cholecalciferol, 25 MCG (1000 UT) TABS, , Disp: , Rfl:     Plan: Continue vitamins with consistency. Anticipate hair regrowth. Staying at 1.7mg OK.     Annual with labs    Bariatric Surgery Review     Interim History/LifeChanges: Doing very well with Semaglutide. Daughter is in school 2X/wk now. Taking phentermine. The phentermine gives energy and Wegovy helps with everything else including stress eating and emotional eating. I can keep up with my kids. This is the bst feeling ever. Can keep up with her daughter and she likes to run a lot. Having hair loss. Did not like the 2.4mg Wegovy as she had side effects she didn't like. The injection hurt.         Patient Concerns: doing well  Appetite (1-10): low  GERD: no    Medication changes: no    Vitamin Intake:   B-12   SL   MVI  PNV two daily   Vitamin D  4,000   Calcium        Other                LABS: \"Reviewed  excellent  Nausea no  Vomiting no  Constipation no  Diarrhea no  Rashes yes  Hair Loss yes  Calf tenderness no  Breathing difficulty no  Reactive Hypoglycemia no  Light Headedness no   Moods euthymic    12 point ROS as above and otherwise negative      Habits:  Alcohol: no  Tobacco: no  Caffeine coffee  NSAIDS no  Exercise Routine: Walking  3 meals/day yes  Protein first yes   60 grams/day gets a protein shake when busy instead of skipping a meal  Water Separate from meals yes  Calorie Containing Beverages no  Restaurant eating/wk <2  Sleeping not well  Stress high  CPAP: NA  Contraception: TL  DEXA:NA    Social History     Social History     Socioeconomic History    Marital status: Single     Spouse name: Not on file    Number of children: Not on file    Years of education: Not on file    Highest education level: Not on file   Occupational History    Not on file   Tobacco Use    Smoking status: Never    Smokeless tobacco: Never   Vaping Use    Vaping status: Never Used   Substance and Sexual Activity    Alcohol use: No    Drug use: No    Sexual activity: " Yes     Partners: Male     Birth control/protection: Condom   Other Topics Concern    Not on file   Social History Narrative    Single, one son, Daughter born 10/2021 Stopped working d/t son's behavior at school and dauther's DX autism.   Son's father is in [place] and not involved  Close with her father who lives in [place]  Not close with her mother who lives here   Miscarried twin boys. Looks after her 19 yo niece who has learning challenges and watches her kids for her while she is at work. Living with her kids now without the father of her youngest daughter.     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 2019    Diet controlled gestational diabetes mellitus (GDM) in second trimester 2019    Gestational diabetes     Gonococcal infection (acute) of lower genitourinary tract     Created by Conversion     Heartburn     Herpes simplex     Hirsutism     History of  delivery, currently pregnant in first trimester 2019    Low back pain     Menorrhagia     Morbid obesity (H)      labor 2011    Single liveborn infant delivered vaginally 10/13/2021    Status post bilateral salpingectomy 10/13/2021     Problem List     Patient Active Problem List   Diagnosis    Morbid obesity (H)    Anemia    Rh negative state in antepartum period, second trimester    History of bariatric surgery     "History of gestational diabetes mellitus    Status post bilateral salpingectomy    Single liveborn infant delivered vaginally     Medications       Current Outpatient Medications:     phentermine (ADIPEX-P) 37.5 MG tablet, TAKE 1/2 TO 1 (ONE-HALF TO ONE) TABLET BY MOUTH ONCE DAILY IN THE MORNING BEFORE BREAKFAST, Disp: 90 tablet, Rfl: 1    Semaglutide-Weight Management (WEGOVY) 1.7 MG/0.75ML pen, Inject 1.7 mg subcutaneously once a week., Disp: 9 mL, Rfl: 3    calcium carbonate (OS-BRYANNA) 500 MG tablet, Take 1 tablet by mouth 2 times daily, Disp: , Rfl:     cyanocobalamin (VITAMIN B-12) 1000 MCG SUBL sublingual tablet, Place 1,000 mcg under the tongue daily, Disp: , Rfl:     ergocalciferol (ERGOCALCIFEROL) 1.25 MG (98593 UT) capsule, Take 50,000 Units by mouth, Disp: , Rfl:     ferrous fumarate 65 mg, [place]. FE,-Vitamin C 125 mg (VITRON C)  MG TABS tablet, Take 1 tablet by mouth daily, Disp: 90 tablet, Rfl: 3    Pediatric Multi Vit-Extra C-FA (FLINTSTONES/EXTRA C) CHEW, Take 1 tablet by mouth, Disp: , Rfl:     Vitamin D, Cholecalciferol, 25 MCG (1000 UT) TABS, , Disp: , Rfl:    Surgical History     Past Surgical History  She has a past surgical history that includes Tonsillectomy; Pr Lap, Chanel Restrict Proc, Longitudinal Gastrectomy (N/A, 10/27/2020); and Laparotomy mini, tubal ligation (post partum), combined (Bilateral, 10/11/2021).    Objective-Exam     Constitutional:  Ht 1.626 m (5' 4\")   Wt 77.1 kg (170 lb)   BMI 29.18 kg/m    General:  Pleasant and in no acute distress     Psychiatric: alert and oriented X3, mood and affect normal    Counseling     We reviewed the important post op bariatric recommendations:  -eating 3 meals daily  -eating protein first, getting >60gm protein daily  -eating slowly, chewing food well  -avoiding/limiting calorie containing beverages  -drinking water 15-30 minutes before or after meals  -choosing wheat, not white with breads, crackers, pastas, edwina, bagels, tortillas, " rice  -limiting restaurant or cafeteria eating to twice a week or less    We discussed the importance of restorative sleep and stress management in maintaining a healthy weight.  We discussed the National Weight Control Registry healthy weight maintenance strategies and ways to optimize metabolism.  We discussed the importance of physical activity including cardiovascular and strength training in maintaining a healthier weight.    We discussed the importance of life-long vitamin supplementation and life-long  follow-up.    Russell was reminded that, to avoid marginal ulcers she should avoid tobacco at all, alcohol in excess, caffeine in excess, and NSAIDS (unless indicated for cardioprotection or othewise and opposed by a PPI).    Jinny Pierre MD, MD, [facility].  11/8/2024  3:09 PM  Total time spent on the date of this encounter doing: chart review, review of test results, patient visit, physical exam, education, counseling, developing plan of care, and documenting = 30 minutes.

## 2024-11-15 ENCOUNTER — VIRTUAL VISIT (OUTPATIENT)
Dept: SURGERY | Facility: CLINIC | Age: 34
End: 2024-11-15
Payer: COMMERCIAL

## 2024-11-15 DIAGNOSIS — Z98.84 S/P BARIATRIC SURGERY: ICD-10-CM

## 2024-11-15 DIAGNOSIS — Z71.3 NUTRITIONAL COUNSELING: ICD-10-CM

## 2024-11-15 DIAGNOSIS — E66.3 OVERWEIGHT: Primary | ICD-10-CM

## 2024-11-15 NOTE — PATIENT INSTRUCTIONS
LEAN PROTEIN SOURCES    Protein Source Portion Calories Grams of Protein                           Nonfat, plain Greek yogurt    (10 grams sugar or less) 3/4 cup (6 oz)  12-17   Light Yogurt (10 grams sugar or less) 3/4 cup (6 oz)  6-8   Protein Shake 1 shake 110-180 15-30   Skim/1% Milk or lactose-free milk 1 cup ( 8 oz)  8   Plain or light, flavored soymilk 1 cup  7-8   Plain or light, hemp milk 1 cup 110 6   Fat Free or 1% Cottage Cheese 1/2 cup 90 15   Part skim ricotta cheese 1/2 cup 100 14   Part skim or reduced fat cheese slices 1/4cup, 3 dice 65-80 8     Mozzarella String Cheese 1 80 8   Canned tuna, chicken, crab or salmon  (canned in water)  1/2 cup 100 15-20   White fish (broiled, grilled, baked) 3 ounces 100 21   [place]/Tuna (broiled, grilled, baked) 3 ounces 150-180 21   Shrimp, Scallops, Lobster, Crab 3 ounces 100 21   Pork loin, Pork Tenderloin 3 ounces 150 21   Boneless, skinless chicken /turkey breast                          (broiled, grilled, baked) 3 ounces 120 21   [place], [place], [place], and Venison 3 ounces 120 21   Lean cuts of red meat and pork (sirloin,   round, tenderloin, flank, ground 93%-96%) 3 ounces 170 21   Lean or Extra Lean Ground Turkey 1/2 cup 150 20   90-95% Lean [place]appen Burger 1 johanne 140-180 21   Low-fat casserole with lean meat 3/4 cup 200 17   Luncheon Meats                                                        (turkey, lean ham, roast beef, chicken) 3 ounces 100 21   Egg (boiled, poached, scrambled) 1 Egg 60 7   Egg Substitute 1/2 cup 70 10   Nuts (limit to 1 serving per day)  3 Tbsp. 150 7   Nut Pavo (peanut, almond)  Limit to 1 serving or less daily 1 Tbsp. 90 4   Soy Burger (varies) 1  10-15   Garbanzo, Black, Moore Beans/Edamame 1/2 cup 110 7-9   Refried Beans 1/2 cup 100 7   Kidney and Lima beans 1/2 cup 110 7   Tempeh 3 oz 175 18   Vegan crumbles 1/2 cup 100 14   Tofu 1/2 cup 110 14   Chili (beans and extra lean beef or turkey) 1 cup 200  23   Lentils 1/2 cup 115 9   Black Bean Soup 1 cup 175 12

## 2024-11-15 NOTE — LETTER
11/15/2024      Russell Gomez  [address]  [place] [address]      Dear Colleague,    Thank you for referring your patient, Russell Gomez, to the [facility]. Please see a copy of my visit note below.    Russell Gomez is a 34 year old who is being evaluated via a billable video visit.      How would you like to obtain your AVS? MyChart  If the video visit is dropped, the invitation should be resent by: Send as text Will anyone else be joining your video visit? No  {If patient encounters technical issues they should call [phone]      Follow up medical weight management with h/o bariatric surgery.     Assessment:  Pt presents for  4 years  post-op RD visit, s/p LSG on 10/27/2020 with Dr. Richey. Today we reviewed current eating habits and level of physical activity, and instructed on the changes that are required for successful bariatric outcomes.    Patient Progress: Continues drinking protein shake and emphasizing water, getting about 50-60oz of water daily. [place]. -Noted some hair thinning.   -Has WIC and EBT assistance.  - struggling with energy       GOAL: reach 150 lbs        Initial weight: 266 lbs  Current weight: 170 lbs (last updated weight)   Weight change: 96 lbs (down), 36% loss    BMI: 29.18 kg/m2    Patient Active Problem List   Diagnosis     Morbid obesity (H)     Anemia     Rh negative state in antepartum period, second trimester     History of bariatric surgery     History of gestational diabetes mellitus     Status post bilateral salpingectomy     Single liveborn infant delivered vaginally       Vitamins   Multi Vit with Iron: yes  Calcium Citrate: yes  B12: yes  D3: yes      Diet Recall/Time:   Breakfast: Protein shake  30g PRO with strawberries or banana or canned pineapple   Lunch: Salad with tuna or chicken   Dinner: Protein with vegetables Or Turkey sandwich on low calorie  "bread with spinach       Eating out: Rarely- will order grilled chicken when eating out.    Typical Snacks: string cheese, balance break pack     Proteins/Veg/Fruits/CHO (NOT well tolerated): avocado    Estimated protein intake: ~60 grams    Meal Duration: 20 minutes    Fluid-meal separation:  Fluids are  30min before and 30 minutes after meals.    Fluid Intake  Water: all day long some with crystal light   Caffeine: 1 cup of coffee (on occasion, not daily) with protein shake as creamer  SF orange cheese     Exercise: trying to find time to exercise      PES statement:      (NC-1.4) Altered GI Function related to Alteration in gastrointestinal tract structure and/or function/ Decreased functional length of the GI tract as evidenced by Weight loss of 36% initial body weight; sleeve gastrectomy      Intervention    Discussion  Recommended to consume 15-20gm protein at 3 meals daily, along with protein supplement/\"planned protein containing snack\" of 15-30gm protein, to reach goal of 60-80 gm protein daily.  Reviewed lean protein sources  Bariatric Plate Method-  including lean/low fat protein at each meal, including a vegetable/fruit, and limiting carbohydrate intake to less than 25% of plate volume.   Discussed budget friendly protein options and quick meal or snack ideas         Handouts provided:  Protein source list     Assessment/Plan:    Follow up with RD in 4 months and bariatrician yearly         .Virtual Visit Details    Type of service:  Video Visit   Video Start Time:  1:00 PM  Video End Time:1:20 PM    Originating Location (pt. Location): Home  {PROVIDER LOCATION On-site should be selected for visits conducted from your clinic location or adjoining [facility] hospital, academic office, or other nearby [facility] building. Off-site should be selected for all other provider locations, including home:[id number]}  Distant Location (provider location):  Off-site  Platform used for Video Visit: Reginald "           Keira Levin RD, PLOLY            Again, thank you for allowing me to participate in the care of your patient.        Sincerely,        Keira Levin RD

## 2024-11-15 NOTE — PROGRESS NOTES
Russell Gomez is a 34 year old who is being evaluated via a billable video visit.      How would you like to obtain your AVS? MyChart  If the video visit is dropped, the invitation should be resent by: Send as text Will anyone else be joining your video visit? No  {If patient encounters technical issues they should call [phone]      Follow up medical weight management with h/o bariatric surgery.     Assessment:  Pt presents for  4 years  post-op RD visit, s/p LSG on 10/27/2020 with Dr. Richey. Today we reviewed current eating habits and level of physical activity, and instructed on the changes that are required for successful bariatric outcomes.    Patient Progress: Continues drinking protein shake and emphasizing water, getting about 50-60oz of water daily. [place]. -Noted some hair thinning.   -Has WIC and EBT assistance.  - struggling with energy       GOAL: reach 150 lbs        Initial weight: 266 lbs  Current weight: 170 lbs (last updated weight)   Weight change: 96 lbs (down), 36% loss    BMI: 29.18 kg/m2    Patient Active Problem List   Diagnosis    Morbid obesity (H)    Anemia    Rh negative state in antepartum period, second trimester    History of bariatric surgery    History of gestational diabetes mellitus    Status post bilateral salpingectomy    Single liveborn infant delivered vaginally       Vitamins   Multi Vit with Iron: yes  Calcium Citrate: yes  B12: yes  D3: yes      Diet Recall/Time:   Breakfast: Protein shake  30g PRO with strawberries or banana or canned pineapple   Lunch: Salad with tuna or chicken   Dinner: Protein with vegetables Or Turkey sandwich on low calorie bread with spinach       Eating out: Rarely- will order grilled chicken when eating out.    Typical Snacks: string cheese, balance break pack     Proteins/Veg/Fruits/CHO (NOT well tolerated): avocado    Estimated protein intake: ~60 grams    Meal Duration: 20  "minutes    Fluid-meal separation:  Fluids are  30min before and 30 minutes after meals.    Fluid Intake  Water: all day long some with crystal light   Caffeine: 1 cup of coffee (on occasion, not daily) with protein shake as creamer  SF orange cheese     Exercise: trying to find time to exercise      PES statement:      (NC-1.4) Altered GI Function related to Alteration in gastrointestinal tract structure and/or function/ Decreased functional length of the GI tract as evidenced by Weight loss of 36% initial body weight; sleeve gastrectomy      Intervention    Discussion  Recommended to consume 15-20gm protein at 3 meals daily, along with protein supplement/\"planned protein containing snack\" of 15-30gm protein, to reach goal of 60-80 gm protein daily.  Reviewed lean protein sources  Bariatric Plate Method-  including lean/low fat protein at each meal, including a vegetable/fruit, and limiting carbohydrate intake to less than 25% of plate volume.   Discussed budget friendly protein options and quick meal or snack ideas         Handouts provided:  Protein source list     Assessment/Plan:    Follow up with RD in 4 months and bariatrician yearly         .Virtual Visit Details    Type of service:  Video Visit   Video Start Time:  1:00 PM  Video End Time:1:20 PM    Originating Location (pt. Location): Home    Distant Location (provider location):  Off-site  Platform used for Video Visit: Reginald Levin RD, POLLY          "

## 2025-03-11 ENCOUNTER — LAB REQUISITION (OUTPATIENT)
Dept: LAB | Facility: CLINIC | Age: 35
End: 2025-03-11

## 2025-03-11 DIAGNOSIS — Z11.3 ENCOUNTER FOR SCREENING FOR INFECTIONS WITH A PREDOMINANTLY SEXUAL MODE OF TRANSMISSION: ICD-10-CM

## 2025-03-11 LAB — HIV 1+2 AB+HIV1 P24 AG SERPL QL IA: NONREACTIVE

## 2025-03-11 PROCEDURE — 87591 N.GONORRHOEAE DNA AMP PROB: CPT | Performed by: OBSTETRICS & GYNECOLOGY

## 2025-03-11 PROCEDURE — 87491 CHLMYD TRACH DNA AMP PROBE: CPT | Performed by: OBSTETRICS & GYNECOLOGY

## 2025-03-11 PROCEDURE — 86803 HEPATITIS C AB TEST: CPT | Performed by: OBSTETRICS & GYNECOLOGY

## 2025-03-11 PROCEDURE — 87340 HEPATITIS B SURFACE AG IA: CPT | Performed by: OBSTETRICS & GYNECOLOGY

## 2025-03-11 PROCEDURE — 87389 HIV-1 AG W/HIV-1&-2 AB AG IA: CPT | Performed by: OBSTETRICS & GYNECOLOGY

## 2025-03-11 PROCEDURE — 86780 TREPONEMA PALLIDUM: CPT | Performed by: OBSTETRICS & GYNECOLOGY

## 2025-03-12 LAB
C TRACH DNA SPEC QL PROBE+SIG AMP: POSITIVE
HBV SURFACE AG SERPL QL IA: NONREACTIVE
HCV AB SERPL QL IA: NONREACTIVE
N GONORRHOEA DNA SPEC QL NAA+PROBE: NEGATIVE
SPECIMEN TYPE: ABNORMAL
T PALLIDUM AB SER QL: NONREACTIVE

## 2025-05-12 ENCOUNTER — TELEPHONE (OUTPATIENT)
Dept: SURGERY | Facility: CLINIC | Age: 35
End: 2025-05-12
Payer: COMMERCIAL

## 2025-05-12 VITALS — HEIGHT: 64 IN | WEIGHT: 140 LBS | BODY MASS INDEX: 23.9 KG/M2

## 2025-05-13 NOTE — TELEPHONE ENCOUNTER
PA Initiation    Medication: WEGOVY 1.7 MG/0.75ML SC SOAJ  Insurance Company: [employer] - Phone [phone] Fax [phone]  Pharmacy Filling the Rx: [facility] [address] [place] [address] E  Filling Pharmacy Phone: [phone]  Filling Pharmacy Fax: [phone]  Start Date: 5/12/2025      
Prior Authorization Approval    Medication: WEGOVY 1.7 MG/0.75ML SC SOAJ  Authorization Effective Date: 5/13/2025  Authorization Expiration Date: 5/13/2026  Reference #: [id number]   Insurance Company: [employer] - Phone [phone] Fax [phone]  Which Pharmacy is filling the prescription: [facility] [address] [place] [address] E  Pharmacy Notified: YES  Patient Notified: YES        
Prior Authorization Retail Medication Request    Medication/Dose: Wegovy 1.7mg/0.5ml Auto-injectors.   New/renewal/insurance change PA/secondary ins. PA: RENEWAL  Previously Tried and Failed:  Diet, Exercise, Weight loss surgery, Phentermine and has been taking Wegovy with good results.   Rationale:  Previous approval  on 2025    Weight on 2023 when started on Sqwlso=728.9 lb, BMI 32.6  Current weight on 2025=140 lb, BMI 24.03    49.9 lb lost, 26.28%    Insurance   Primary: [email or web address]   Insurance ID:  [id number]     Key: [id number]    Pharmacy Information (if different than what is on RX)  Name:  [facility] [address] [place] [address]   Phone:  [phone]   Fax:  [phone]     Clinic Information  Preferred routing pool for dept communication: Bariatric Surgery Support Pool East     
3

## 2025-06-06 ENCOUNTER — LAB REQUISITION (OUTPATIENT)
Dept: LAB | Facility: CLINIC | Age: 35
End: 2025-06-06

## 2025-06-06 DIAGNOSIS — N76.0 ACUTE VAGINITIS: ICD-10-CM

## 2025-06-06 PROCEDURE — 87591 N.GONORRHOEAE DNA AMP PROB: CPT | Performed by: OBSTETRICS & GYNECOLOGY

## 2025-06-07 LAB
C TRACH DNA SPEC QL PROBE+SIG AMP: NEGATIVE
N GONORRHOEA DNA SPEC QL NAA+PROBE: NEGATIVE
SPECIMEN TYPE: NORMAL

## (undated) DEVICE — PREP DURAPREP 26ML APL 8630

## (undated) DEVICE — NEEDLE HYPO 22X1-1/2 SAFETY 305900

## (undated) DEVICE — DRAPE OR LAPAROTOMY KC 89228*

## (undated) DEVICE — Device

## (undated) DEVICE — DRSG PRIMAPORE 03 1/8X6" 66000318

## (undated) DEVICE — GLOVE BIOGEL PI ULTRATOUCH G SZ 6.0 42160

## (undated) DEVICE — SOL WATER IRRIG 1000ML BOTTLE 2F7114

## (undated) DEVICE — SUTURE VICRYL+ 4-0 UNDYED PS-2 VCP496H

## (undated) DEVICE — CATH TRAY FOLEY SURESTEP 16FR DRAIN BAG STATOCK A899916

## (undated) DEVICE — CUSTOM PACK GEN MAJOR SBA5BGMHEA

## (undated) DEVICE — PAD SANITARY OB SUPER MAXI OB0310SL

## (undated) DEVICE — SU VICRYL+ 0 27 UR6 VLT VCP603H

## (undated) DEVICE — GOWN LG DISP 9515

## (undated) DEVICE — GLOVE UNDER INDICATOR PI SZ 6 LF 41660

## (undated) DEVICE — SOL NACL 0.9% IRRIG 1000ML BOTTLE 2F7124

## (undated) DEVICE — SUCTION MANIFOLD NEPTUNE 2 SYS 1 PORT 702-025-000

## (undated) DEVICE — SU PLAIN 0 TIE 54" S104H

## (undated) DEVICE — SYR 10ML LL W/O NDL 302995

## (undated) RX ORDER — BUPIVACAINE HYDROCHLORIDE 2.5 MG/ML
INJECTION, SOLUTION INFILTRATION; PERINEURAL
Status: DISPENSED
Start: 2021-10-11